# Patient Record
Sex: FEMALE | URBAN - METROPOLITAN AREA
[De-identification: names, ages, dates, MRNs, and addresses within clinical notes are randomized per-mention and may not be internally consistent; named-entity substitution may affect disease eponyms.]

---

## 2018-07-24 ENCOUNTER — OFFICE VISIT (OUTPATIENT)
Dept: URGENT CARE | Facility: URGENT CARE | Age: 38
End: 2018-07-24
Payer: COMMERCIAL

## 2018-07-24 ENCOUNTER — ANCILLARY PROCEDURE (OUTPATIENT)
Dept: RADIOLOGY | Facility: URGENT CARE | Age: 38
End: 2018-07-24
Payer: COMMERCIAL

## 2018-07-24 VITALS
TEMPERATURE: 97.8 F | HEART RATE: 89 BPM | BODY MASS INDEX: 20.58 KG/M2 | HEIGHT: 61 IN | OXYGEN SATURATION: 98 % | DIASTOLIC BLOOD PRESSURE: 86 MMHG | WEIGHT: 109 LBS | SYSTOLIC BLOOD PRESSURE: 119 MMHG | RESPIRATION RATE: 17 BRPM

## 2018-07-24 DIAGNOSIS — R06.00 DYSPNEA, UNSPECIFIED TYPE: ICD-10-CM

## 2018-07-24 DIAGNOSIS — R10.12 LEFT UPPER QUADRANT PAIN: Primary | ICD-10-CM

## 2018-07-24 LAB
ALBUMIN SERPL-MCNC: 3.6 G/DL (ref 3.4–5)
ALP SERPL-CCNC: 39 U/L (ref 37–98)
ALT SERPL-CCNC: 21 U/L (ref 0–77)
ANION GAP SERPL CALC-SCNC: 3 MMOL/L (ref 3–11)
AST SERPL-CCNC: 24 U/L (ref 0–37)
BASOPHILS # BLD AUTO: 0.04 10*3/UL
BASOPHILS NFR BLD AUTO: 0 % (ref 0–2)
BILIRUB SERPL-MCNC: 0.9 MG/DL (ref 0–1.4)
BILIRUB UR QL: NEGATIVE
BUN SERPL-MCNC: 14 MG/DL (ref 7–25)
CALCIUM ALBUM COR SERPL-MCNC: 9.2 MG/DL (ref 8.5–10.1)
CALCIUM SERPL-MCNC: 8.9 MG/DL (ref 8.6–10.1)
CHLORIDE SERPL-SCNC: 103 MMOL/L (ref 98–107)
CLARITY UR: CLEAR
CO2 SERPL-SCNC: 29 MMOL/L (ref 21–32)
COLOR UR: YELLOW
CREAT SERPL-MCNC: 0.8 MG/DL (ref 0.6–1.1)
EOSINOPHIL # BLD AUTO: 0.11 10*3/UL
EOSINOPHIL NFR BLD AUTO: 1 % (ref 0–5)
ERYTHROCYTE [DISTWIDTH] IN BLOOD BY AUTOMATED COUNT: 11.4 % (ref 11.6–14.8)
GFR SERPL CREATININE-BSD FRML MDRD: 81 ML/MIN/1.73M*2
GLUCOSE SERPL-MCNC: 78 MG/DL (ref 70–105)
GLUCOSE UR QL: NEGATIVE MG/DL
HCG UR QL: NEGATIVE
HCT VFR BLD AUTO: 41.4 % (ref 37.7–47.9)
HGB BLD-MCNC: 14.3 G/DL (ref 12.1–16.2)
HGB UR QL: ABNORMAL
KETONES UR-MCNC: NEGATIVE MG/DL
LEUKOCYTE ESTERASE UR QL STRIP: NEGATIVE
LIPASE SERPL-CCNC: 88 U/L (ref 11–82)
LYMPHOCYTES # BLD AUTO: 3 10*3/UL
LYMPHOCYTES NFR BLD AUTO: 31 % (ref 13–40)
MCH RBC QN AUTO: 32.6 PG (ref 26–34)
MCHC RBC AUTO-ENTMCNC: 34.5 G/DL (ref 31–36)
MCV RBC AUTO: 94.5 FL (ref 80–100)
MONOCYTES # BLD AUTO: 0.62 10*3/UL
MONOCYTES NFR BLD AUTO: 6 % (ref 2–11)
NEUTROPHILS # BLD AUTO: 5.95 10*3/UL
NEUTROPHILS NFR BLD AUTO: 61 % (ref 47–80)
NITRITE UR QL: NEGATIVE
PH UR: 5 PH
PLATELET # BLD AUTO: 288 10*3/UL (ref 140–440)
PMV BLD AUTO: 7.9 FL (ref 6.9–10.8)
POTASSIUM SERPL-SCNC: 3.5 MMOL/L (ref 3.6–5)
PROT SERPL-MCNC: 6.7 G/DL (ref 6.4–8.2)
PROT UR STRIP-MCNC: NEGATIVE MG/DL
RBC # BLD AUTO: 4.38 10*6/ΜL (ref 4.05–5.48)
SODIUM SERPL-SCNC: 135 MMOL/L (ref 135–145)
SP GR UR: 1.02 (ref 1–1.03)
UROBILINOGEN UR-MCNC: 0.2 E.U./DL
WBC # BLD AUTO: 9.7 10*3/UL (ref 4.1–10.9)

## 2018-07-24 PROCEDURE — 81003 URINALYSIS AUTO W/O SCOPE: CPT | Performed by: NURSE PRACTITIONER

## 2018-07-24 PROCEDURE — 74018 RADEX ABDOMEN 1 VIEW: CPT | Mod: TC | Performed by: NURSE PRACTITIONER

## 2018-07-24 PROCEDURE — 81025 URINE PREGNANCY TEST: CPT | Performed by: NURSE PRACTITIONER

## 2018-07-24 PROCEDURE — 36415 COLL VENOUS BLD VENIPUNCTURE: CPT | Performed by: NURSE PRACTITIONER

## 2018-07-24 PROCEDURE — 85025 COMPLETE CBC W/AUTO DIFF WBC: CPT | Performed by: NURSE PRACTITIONER

## 2018-07-24 PROCEDURE — 96372 THER/PROPH/DIAG INJ SC/IM: CPT | Performed by: NURSE PRACTITIONER

## 2018-07-24 PROCEDURE — 87088 URINE BACTERIA CULTURE: CPT | Performed by: NURSE PRACTITIONER

## 2018-07-24 PROCEDURE — S0119 ONDANSETRON 4 MG: HCPCS | Performed by: NURSE PRACTITIONER

## 2018-07-24 PROCEDURE — 80053 COMPREHEN METABOLIC PANEL: CPT | Performed by: NURSE PRACTITIONER

## 2018-07-24 PROCEDURE — 93000 ELECTROCARDIOGRAM COMPLETE: CPT | Performed by: NURSE PRACTITIONER

## 2018-07-24 PROCEDURE — 99203 OFFICE O/P NEW LOW 30 MIN: CPT | Mod: 25 | Performed by: NURSE PRACTITIONER

## 2018-07-24 PROCEDURE — 83690 ASSAY OF LIPASE: CPT | Performed by: NURSE PRACTITIONER

## 2018-07-24 RX ORDER — FLUTICASONE PROPIONATE 50 MCG
SPRAY, SUSPENSION (ML) NASAL
Refills: 3 | COMMUNITY
Start: 2018-05-29

## 2018-07-24 RX ORDER — ETHYNODIOL DIACETATE AND ETHINYL ESTRADIOL 1 MG-35MCG
KIT ORAL
Refills: 0 | COMMUNITY
Start: 2018-07-06

## 2018-07-24 RX ORDER — TIZANIDINE 4 MG/1
TABLET ORAL
Refills: 10 | COMMUNITY
Start: 2018-05-29 | End: 2018-07-24 | Stop reason: SDUPTHER

## 2018-07-24 RX ORDER — PHENOL/SODIUM PHENOLATE
AEROSOL, SPRAY (ML) MUCOUS MEMBRANE
COMMUNITY

## 2018-07-24 RX ORDER — HYDROGEN PEROXIDE 3 %
20 SOLUTION, NON-ORAL MISCELLANEOUS
Qty: 30 CAPSULE | Refills: 1 | Status: SHIPPED | OUTPATIENT
Start: 2018-07-24 | End: 2018-08-23

## 2018-07-24 RX ORDER — LIDOCAINE HYDROCHLORIDE 20 MG/ML
15 SOLUTION OROPHARYNGEAL ONCE
Status: COMPLETED | OUTPATIENT
Start: 2018-07-24 | End: 2018-07-24

## 2018-07-24 RX ORDER — KETOROLAC TROMETHAMINE 30 MG/ML
60 INJECTION, SOLUTION INTRAMUSCULAR; INTRAVENOUS ONCE
Status: COMPLETED | OUTPATIENT
Start: 2018-07-24 | End: 2018-07-24

## 2018-07-24 RX ORDER — DEXTROAMPHETAMINE SACCHARATE, AMPHETAMINE ASPARTATE, DEXTROAMPHETAMINE SULFATE AND AMPHETAMINE SULFATE 5; 5; 5; 5 MG/1; MG/1; MG/1; MG/1
TABLET ORAL
Refills: 0 | COMMUNITY
Start: 2018-07-05

## 2018-07-24 RX ORDER — ALUMINUM HYDROXIDE, MAGNESIUM HYDROXIDE, AND SIMETHICONE 1200; 120; 1200 MG/30ML; MG/30ML; MG/30ML
30 SUSPENSION ORAL ONCE
Status: COMPLETED | OUTPATIENT
Start: 2018-07-24 | End: 2018-07-24

## 2018-07-24 RX ORDER — CYCLOBENZAPRINE HCL 10 MG
10 TABLET ORAL 3 TIMES DAILY PRN
Qty: 20 TABLET | Refills: 0 | Status: SHIPPED | OUTPATIENT
Start: 2018-07-24 | End: 2018-08-03

## 2018-07-24 RX ORDER — LINACLOTIDE 290 UG/1
CAPSULE, GELATIN COATED ORAL
Refills: 3 | COMMUNITY
Start: 2018-07-13

## 2018-07-24 RX ORDER — ONDANSETRON 4 MG/1
4 TABLET, ORALLY DISINTEGRATING ORAL ONCE
Status: COMPLETED | OUTPATIENT
Start: 2018-07-24 | End: 2018-07-24

## 2018-07-24 RX ORDER — VALACYCLOVIR HYDROCHLORIDE 500 MG/1
500 TABLET, FILM COATED ORAL
COMMUNITY
Start: 2018-03-20

## 2018-07-24 RX ORDER — HYOSCYAMINE SULFATE 0.12 MG/1
TABLET SUBLINGUAL
COMMUNITY
Start: 2017-08-21

## 2018-07-24 RX ORDER — ONDANSETRON 4 MG/1
4 TABLET, FILM COATED ORAL EVERY 8 HOURS PRN
Qty: 20 TABLET | Refills: 0 | Status: SHIPPED | OUTPATIENT
Start: 2018-07-24 | End: 2018-07-31

## 2018-07-24 RX ORDER — OMEPRAZOLE 40 MG/1
40 CAPSULE, DELAYED RELEASE ORAL
COMMUNITY
Start: 2017-08-14

## 2018-07-24 RX ADMIN — ONDANSETRON 4 MG: 4 TABLET, ORALLY DISINTEGRATING ORAL at 18:49

## 2018-07-24 RX ADMIN — ALUMINUM HYDROXIDE, MAGNESIUM HYDROXIDE, AND SIMETHICONE 30 ML: 1200; 120; 1200 SUSPENSION ORAL at 19:31

## 2018-07-24 RX ADMIN — KETOROLAC TROMETHAMINE 60 MG: 30 INJECTION, SOLUTION INTRAMUSCULAR; INTRAVENOUS at 19:03

## 2018-07-24 RX ADMIN — LIDOCAINE HYDROCHLORIDE 15 ML: 20 SOLUTION OROPHARYNGEAL at 19:32

## 2018-07-24 ASSESSMENT — ENCOUNTER SYMPTOMS
FEVER: 0
CHILLS: 0
HEMATURIA: 0
FLANK PAIN: 0
VOMITING: 0
NECK PAIN: 0

## 2018-07-25 ASSESSMENT — ENCOUNTER SYMPTOMS
NAUSEA: 1
COLOR CHANGE: 0
CHEST TIGHTNESS: 0
NUMBNESS: 0
DIARRHEA: 1
WOUND: 0
SHORTNESS OF BREATH: 1
ABDOMINAL PAIN: 1
PALPITATIONS: 0
WHEEZING: 0
DIZZINESS: 0
DYSURIA: 0
WEAKNESS: 0
CONSTIPATION: 1
SORE THROAT: 0

## 2018-07-25 NOTE — PROGRESS NOTES
"Subjective      HPI    Lori Velasquez is a 38 y.o. female who presents for left upper quadrant abdominal pain ×3 days.  Patient states she has had this pain in the past where she was told she possibly has kidney stones but there is no definitive answer.  She states she developed sudden onset of left upper quadrant abdominal pain felt sharp and wax and wane with intensity.  She states the accompanied with his pain is nausea.  She denies fever, chills, vomiting, dysuria, or urinary symptoms.  Patient states her pain in her abdomen is located in her left upper quadrant that radiates to her right upper quadrant.  States pain is aggravated with eating and has not found any alleviating factors.  She admits to having reflux off and on as well and has tried Pepto-Bismol and over-the-counter Pepcid without improvement.  She has diarrhea daily in the morning due to Linzess.      Review of Systems   Constitutional: Negative for chills and fever.   HENT: Negative for congestion and sore throat.    Respiratory: Positive for shortness of breath. Negative for chest tightness and wheezing.    Cardiovascular: Negative for chest pain, palpitations and leg swelling.   Gastrointestinal: Positive for abdominal pain, constipation (chronic), diarrhea (every morning from taking linzess) and nausea. Negative for vomiting.   Genitourinary: Negative for dysuria, flank pain, hematuria, urgency and vaginal discharge.   Musculoskeletal: Negative for neck pain.   Skin: Negative.  Negative for color change, pallor, rash and wound.   Neurological: Negative for dizziness, weakness and numbness.         Objective   /86 (BP Location: Left arm, Patient Position: Sitting, Cuff Size: Reg)   Pulse 89   Temp 36.6 °C (97.8 °F) (Temporal)   Resp 17   Ht 1.549 m (5' 1\")   Wt 49.4 kg (109 lb)   SpO2 98%   BMI 20.60 kg/m²     Physical Exam   Constitutional: She is oriented to person, place, and time. She appears well-developed and " well-nourished. No distress.   Patient is tearful during exam   HENT:   Head: Normocephalic and atraumatic.   Right Ear: Tympanic membrane, external ear and ear canal normal.   Left Ear: Tympanic membrane, external ear and ear canal normal.   Nose: Nose normal.   Mouth/Throat: Oropharynx is clear and moist and mucous membranes are normal. No oropharyngeal exudate.   Eyes: Pupils are equal, round, and reactive to light. No scleral icterus.   Neck: Normal range of motion.   Cardiovascular: Normal rate, regular rhythm, normal heart sounds and intact distal pulses.  Exam reveals no gallop and no friction rub.    No murmur heard.  Pulmonary/Chest: Effort normal and breath sounds normal. No respiratory distress. She has no wheezes. She has no rales. She exhibits no tenderness.   Abdominal: Soft. Bowel sounds are normal. She exhibits no distension and no mass. There is no hepatosplenomegaly, splenomegaly or hepatomegaly. There is tenderness (bilateral upper quadrants, greater to LUQ) in the right upper quadrant, epigastric area and left upper quadrant. There is no CVA tenderness and negative Javier's sign (had a boby in the past). No hernia.   Musculoskeletal: Normal range of motion.   Neurological: She is alert and oriented to person, place, and time. She has normal strength. No sensory deficit. She exhibits normal muscle tone.   Skin: Skin is warm and dry. Capillary refill takes less than 2 seconds. No bruising and no rash noted. She is not diaphoretic.   Psychiatric: She has a normal mood and affect. Her behavior is normal. Judgment and thought content normal.   Nursing note and vitals reviewed.      No results found for this or any previous visit (from the past 4 hour(s)).    X-ray Kidneys Ureters Bladder (normal, Clinic Performed)    Result Date: 7/24/2018  EXAM : Abdomen single view 07/24/2018 1850 hours. CLINICAL HISTORY : Left upper quadrant pain; LUQ abdominal pain    VIEWS : Supine abdomen COMPARISON(S) : None  FINDINGS : The bowel gas pattern is nonobstructive. No dilated bowel loops are seen. There is no apparent soft tissue mass or mass effect.  No significant pathologic calcifications are identified.  The bones are unremarkable for age.          IMPRESSION: Negative exam.      Assessment/Plan   Diagnoses and all orders for this visit:    Left upper quadrant pain  -     Urinalysis, dipstick Urine, Clean Catch  -     HCG, qualitative, urine Urine, Clean Catch  -     X-ray kidneys ureters bladder (Normal, Clinic Performed)  -     CBC w/auto differential Blood, Venous  -     Comprehensive metabolic panel Blood, Venous  -     Lipase Blood, Venous  -     ondansetron ODT (ZOFRAN-ODT) disintegrating tablet 4 mg; Take 1 tablet (4 mg total) by mouth once.   -     ketorolac (TORADOL) injection 60 mg; Inject 2 mL (60 mg total) into the shoulder, thigh, or buttocks once.   -     alum-mag hydroxide-simeth (MAALOX ADVANCED) suspension 30 mL; Take 30 mL by mouth once.   -     lidocaine HCl ((viscous)) 2 % mucosal solution 15 mL; Swish and spit 15 mL once.   -     ECG 12 lead  -     ondansetron (ZOFRAN) 4 mg tablet; Take 1 tablet (4 mg total) by mouth every 8 (eight) hours as needed for nausea or vomiting for up to 7 days.  -     cyclobenzaprine (FLEXERIL) 10 mg tablet; Take 1 tablet (10 mg total) by mouth 3 (three) times a day as needed for muscle spasms for up to 10 days.  -     esomeprazole (NexIUM) 20 mg capsule; Take 1 capsule (20 mg total) by mouth every morning before breakfast.  -     Urine culture    Chest pain on breathing        Discussion:     Patient was provided Zofran and GI cocktail.  Patient admits to improvement with nausea with Zofran but denies any improvement with abdominal discomfort with GI cocktail.  She was also provided Toradol with slight improvement with abdominal pain.  Did discuss with patient given her abdominal pain during exam, was concerning and may benefit from having a CAT scan.  Patient states she  has slight improvement will see how she feels throughout the night.  Strongly encourage patient to go to the ER should she develop severe pain again as well as fever, intractable vomiting, or any other new concerning symptoms.  Due to patient declining a CAT scan at this time, did offer patient to contact us tomorrow should she change her mind or develop abdominal pain and I will order one.  Will call patient with lipase and urine culture results and extend offer again for imaging depending on how patient is feeling.  Patient agrees with plan.  Patient verbalized understanding and denies any  further questions or concerns at this time.       Jennifer G Franke, CNP

## 2018-07-27 LAB — BACTERIA UR CULT: NORMAL

## 2018-09-11 DIAGNOSIS — R10.12 LEFT UPPER QUADRANT PAIN: ICD-10-CM

## 2018-09-11 RX ORDER — HYDROGEN PEROXIDE 3 %
SOLUTION, NON-ORAL MISCELLANEOUS
Qty: 30 CAPSULE | Refills: 0 | OUTPATIENT
Start: 2018-09-11

## 2020-11-25 ENCOUNTER — TRANSFERRED RECORDS (OUTPATIENT)
Dept: HEALTH INFORMATION MANAGEMENT | Facility: CLINIC | Age: 40
End: 2020-11-25

## 2021-06-15 ENCOUNTER — TRANSFERRED RECORDS (OUTPATIENT)
Dept: HEALTH INFORMATION MANAGEMENT | Facility: CLINIC | Age: 41
End: 2021-06-15

## 2021-06-16 ENCOUNTER — TRANSFERRED RECORDS (OUTPATIENT)
Dept: HEALTH INFORMATION MANAGEMENT | Facility: CLINIC | Age: 41
End: 2021-06-16

## 2022-05-16 ENCOUNTER — TRANSFERRED RECORDS (OUTPATIENT)
Dept: HEALTH INFORMATION MANAGEMENT | Facility: CLINIC | Age: 42
End: 2022-05-16

## 2022-05-16 LAB
ALT SERPL-CCNC: 20 (ref 5–45)
AST SERPL-CCNC: 24 (ref 5–45)
CREATININE (EXTERNAL): 0.63 MG/DL (ref 0.4–1.1)
GFR ESTIMATED (EXTERNAL): >90 ML/MIN/1.73M2
GLUCOSE (EXTERNAL): 92 MG/DL (ref 70–99)
POTASSIUM (EXTERNAL): 3.4 MEQ/LITER (ref 3.4–5)
TSH SERPL-ACNC: 2.64 (ref 0.55–4.78)

## 2022-05-24 ENCOUNTER — TRANSCRIBE ORDERS (OUTPATIENT)
Dept: OTHER | Age: 42
End: 2022-05-24
Payer: COMMERCIAL

## 2022-05-24 DIAGNOSIS — L65.9 HAIR LOSS: Primary | ICD-10-CM

## 2022-07-20 ENCOUNTER — TRANSFERRED RECORDS (OUTPATIENT)
Dept: HEALTH INFORMATION MANAGEMENT | Facility: CLINIC | Age: 42
End: 2022-07-20

## 2022-07-21 ENCOUNTER — TRANSFERRED RECORDS (OUTPATIENT)
Dept: HEALTH INFORMATION MANAGEMENT | Facility: CLINIC | Age: 42
End: 2022-07-21

## 2022-10-21 ENCOUNTER — TRANSFERRED RECORDS (OUTPATIENT)
Dept: HEALTH INFORMATION MANAGEMENT | Facility: CLINIC | Age: 42
End: 2022-10-21

## 2022-11-10 ENCOUNTER — TRANSFERRED RECORDS (OUTPATIENT)
Dept: HEALTH INFORMATION MANAGEMENT | Facility: CLINIC | Age: 42
End: 2022-11-10

## 2022-11-10 LAB
ALT SERPL-CCNC: 37 IU/L (ref 0–32)
AST SERPL-CCNC: 29 IU/L (ref 0–40)
CREATININE (EXTERNAL): 0.74 MG/DL (ref 0.57–1)
GFR ESTIMATED (EXTERNAL): 104 ML/MIN/1.73
POTASSIUM (EXTERNAL): 4 MMOL/L (ref 3.5–5.2)
TSH SERPL-ACNC: 2.51 UIU/ML (ref 0.45–4.5)

## 2022-12-02 ENCOUNTER — TRANSFERRED RECORDS (OUTPATIENT)
Dept: HEALTH INFORMATION MANAGEMENT | Facility: CLINIC | Age: 42
End: 2022-12-02

## 2022-12-23 NOTE — TELEPHONE ENCOUNTER
FUTURE VISIT INFORMATION      FUTURE VISIT INFORMATION:    Date: 3.13.23    Time: 9:10    Location: [facility]  REFERRAL INFORMATION:    Referring provider:  Christiana    Referring providers clinic:  [facility]    Reason for visit/diagnosis  Hair loss, Referred by: Nani De La Paz with [facility], No Recs, recs will be sent over, per pt Va    RECORDS REQUESTED FROM:       Clinic name Comments Records Status Imaging Status   [facility] Received Sent to scanning                                    Action 2.14.23 jm   Action Taken Faxed records request to [facility] at [phone].    Recs received. Sent to HIM for scanning into chart. Also placed in Dr. Carbone's Med Recods tab.

## 2023-01-06 ENCOUNTER — TRANSFERRED RECORDS (OUTPATIENT)
Dept: HEALTH INFORMATION MANAGEMENT | Facility: CLINIC | Age: 43
End: 2023-01-06

## 2023-01-30 ENCOUNTER — TELEPHONE (OUTPATIENT)
Dept: DERMATOLOGY | Facility: CLINIC | Age: 43
End: 2023-01-30
Payer: COMMERCIAL

## 2023-01-30 NOTE — TELEPHONE ENCOUNTER
[facility] Call Center    Phone Message    May a detailed message be left on voicemail: yes     Reason for Call: Other: Patient has an appt with Dr. Carbone on 03/13/2023. Patient is travelling from [place] for the appt and wants to know if there is any lab work that would need to be done that she can get orders for and sent to [place] so that she will be best prepared for her appt. She also would like to know if there is any medical release forms she needs and can have sent to her so that is already taken care of as well.    Please call back  Thank you    Action Taken: Message routed to:  [facility] ([facility] Derm    Travel Screening: Not Applicable

## 2023-01-31 NOTE — TELEPHONE ENCOUNTER
Writer spoke with patient on this date.  New patient: HL referral    She has an upcoming appointment with Dr. Carbone on 3/13/23 and would like to know if there are any labs she can get done prior. She is traveling from [place], [place].    Writer explained to patient that provider may choose to order same day labs on her appointment date, if necessary. Otherwise, we would certainly welcome any derm records to review in preparation for her upcoming appointment.    Patient was provided with Dermatology Clinic address, phone number, and fax. Also sent patient [employer] link to sign up.    Patient had no further questions.    Kimberly Pulido LPN

## 2023-02-06 ENCOUNTER — TRANSFERRED RECORDS (OUTPATIENT)
Dept: HEALTH INFORMATION MANAGEMENT | Facility: CLINIC | Age: 43
End: 2023-02-06

## 2023-02-06 NOTE — TELEPHONE ENCOUNTER
Marlys Carbone MD  You 2 days ago     ALEC Gill,   Totally agree - we will make any decisions regarding labs after we meet Va.   However, Odalis, can you print out any information we have to date on this patient  - I can review in advance.  Maybe you can forward in clinic Tuesday?   Regards,         Writer spoke to the patient on this date. Patient will work on getting records faxed to us.  I confirmed our fax number.    Kimberly Pulido LPN

## 2023-02-12 ENCOUNTER — HEALTH MAINTENANCE LETTER (OUTPATIENT)
Age: 43
End: 2023-02-12

## 2023-02-24 ENCOUNTER — TRANSFERRED RECORDS (OUTPATIENT)
Dept: HEALTH INFORMATION MANAGEMENT | Facility: CLINIC | Age: 43
End: 2023-02-24

## 2023-02-27 ENCOUNTER — TRANSFERRED RECORDS (OUTPATIENT)
Dept: HEALTH INFORMATION MANAGEMENT | Facility: CLINIC | Age: 43
End: 2023-02-27

## 2023-03-01 ENCOUNTER — TRANSFERRED RECORDS (OUTPATIENT)
Dept: HEALTH INFORMATION MANAGEMENT | Facility: CLINIC | Age: 43
End: 2023-03-01

## 2023-03-02 ENCOUNTER — TRANSFERRED RECORDS (OUTPATIENT)
Dept: HEALTH INFORMATION MANAGEMENT | Facility: CLINIC | Age: 43
End: 2023-03-02

## 2023-03-06 NOTE — PROGRESS NOTES
[facility] Dermatology Note  Encounter Date: Mar 13, 2023  Office Visit     Dermatology Problem List:  1. 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 Abnormal iron levels  3. Dermatitis NOS, s/p punch bx 3/13/2023 (pending)  4. 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   Outside biopsy uploaded to media tab. Outside bx c/w androgenetic alopecia vs dermal hypersensitivity. Scalp on exam is pink and patient reports active burning sensation.   - Hair metrix performed today   - Labs ordered: CBC, CMP, Vit D, TSH, Iron studies (ferritin, iron, tibc), androgen studies (free and total testosterone, DHEAS ), UA, rheumatology labs (see below)  - Continue minoxidil 5% foam to scalp nightly.   - Continue topical spironolactone   - Scalp biopsy today, see procedure note below, ddx: dermal hypersensitivity including irritant dermatitis, contact dermatitis, neurogenic vs androgenetic  - Epidermal nerve biopsy of the scalp today, see procedure note below  - Hair Metrix performed today    # Abnormal iron levels & low testosterone  - Planning to see endocrinology    # Dermatitis NOS    On exam there are pink scaly papules on the trunk. Patient brings photos with an erythematous patchy cutaneous eruption involving legs, arms, chest, neck. One image with livedo involving the thighs. Systemic symptoms reported by patient include: joint pain, arm muscle weakness, SOB, malaise.  ddx: dermal hypersensitivity, contact vs irritant dermatitis, atopic dermatitis > mastocytosis vs vasculitis.  Per patient, was worked up by rheum during which she was  tested for lupus (her mother has lupus), but her workup was unremarkable.   - Patient will send over patch testing results. Record release signed.    - f/u dermatomyositis panel, tryptase, SPEP, UA, JEREMY, ANCA, Ige, anti IgE.  - L trunk punch biopsy today, see procedure note below  - photos patient brought in are in chart.     Procedures Performed:   - Punch biopsy procedure note, location(s): left trunk x 1. After discussion of benefits and risks including but not limited to bleeding, infection, scar, incomplete removal, recurrence, and non-diagnostic biopsy, written consent and photographs were obtained. The area was cleaned with isopropyl alcohol. 0.5mL of 1% lidocaine with epinephrine was injected to obtain adequate anesthesia and a 4 mm punch biopsy was performed at site(s). 4-0 Prolene sutures were utilized to approximate the epidermal edges. White petrolatum ointment and a bandage was applied to the wound. Explicit verbal and written wound care instructions were provided. The patient left the dermatology clinic in good condition.   - Scalp punch biopsy procedure note, location(s): L parietal x3 (vertical, horizontal, epidermal nerve fiber). After discussion of benefits and risks including but not limited to bleeding, infection, scar, incomplete removal, recurrence, and non-diagnostic biopsy, written consent and photographs were obtained. The area was cleaned with isopropyl alcohol. 1% lidocaine with epinephrine was injected to obtain adequate anesthesia of 2 lesion(s) . Two 4 mm punch biopsies were performed, one for horizontal and one for vertical sectioning. 4-0 Prolene sutures were utilized to approximate the epidermal edges. White petrolatum ointment and a bandage was applied to the wound. Explicit verbal and written wound care instructions were provided. The patient left the dermatology clinic in good condition.     Hair Metrix:   - Frontal Scalp: 241, mild perifollicular scale   - Mid-Scalp: 300   -  "Vertex Scalp: 266, mild perifollicular and loose scale, patchy pinkness   - Occipital Scalp: 217   - Right Temporal Scalp: 191   - Left Temporal Scalp: 174    Follow-up: 3-4 week(s) virtually (telephone with photos) to discuss biopsy results    Staff and Scribe:      Scribe Disclosure:  I, GAVIN MTZ, am serving as a scribe to document services personally performed by Marlys Carbone MD based on data collection and the provider's statements to me.     Dr Alicia MD PGY4    Provider Disclosure:     Provider Disclosure:   The documentation recorded by the scribe accurately reflects the services I personally performed and the decisions made by me.    Patient was seen and examined with the medicine/dermatology resident. I agree with the history, review of systems, physical examination, assessments and plan. I was present for the key portions of the biopsy procedures.    Marlys Carbone MD  Professor and  Chair  Department of Dermatology  [facility]    ____________________________________________    CC: Hair Loss (HL: has worsened since January 2022, notes that scalp \"burns and tingles\")    HPI:  Ms. Va Riddle is a 42 year old female who presents as a new patient for evaluation of hair loss. The patient first noticed hair thinning several years ago. Was previously slow but in January 2022 the thinning worsened rapidly. Experiences burning and tingling sensations on her scalp that will worsen right before her period. Was also having irregular and painful periods at this time with history of a fibroid. Noticed that hair loss also worsened when she stopped taking her birth control pills. Had some irregular iron labs in September and is planning to see endocrinology. Has joint pain and some hives/a rash on her body. Rash is triggered by sunlight. Labs did not indicate Lupus. Some muscle weakness in her hands and arms. Recent problems with shortness of breath and dizziness. No " issues with swallowing. History of Raynaud's.  - Seen at the request of: [facility] in [place]   - Reason for referral: hair loss  - Onset of hair loss: Several years ago, noticeable worsening January 2022  - Affected areas: temples, back of the scalp  - Shedding or thinning, or both: Thinning   - Percentage of hair loss: 70%  Yes - the burning sensation Scalp pain   Yes- along the temples 4-5/10 Scalp burning   No - history  Scalp itching    Yes- thinning Eyebrow changes    No Eyelash changes   No Beard changes    No Other body hair changes    No Nail changes    No Additional symptoms? (please list below)     - Current treatments: Rogaine 5% foam, topical spironolactone   - Prior discontinued treatments: None  - Prior biopsies: Yes, showed androgenetic alopecia (records available:  dermatology)  - Prior labs: TSH, T4, CMP, Ferritin, Iron, CBC, Testosterone (9/20/22)   - Scalp or hair care habits/products:   - - - Which products? How many times per week?: Multiple and varied products  - - - Frequency of hair sprays, gels, dyes, heat styling, perms, weaves or extensions?: Colors her hair once monthly, Extensions   - Menses: Irregular menstruation with irregular bleeinf  - Unwanted hair growth/hirsutism: Yes  - Personal history of thyroid dysfunction or autoimmune disease: No but recent history of abnormal iron levels, rashes present over her body, low testosterone, shortness of breath   - Family history of hair loss: Yes, younger sister and grandmother   - Family history of autoimmune disease: Grandmother with Lupus   - Major family, financial, school/work, or other social stressors in the few months prior to hair loss: Ongoing stress  - Major recent illness/hospitalizations: None  - COVID status: Yes, unknown date    Patient is otherwise feeling well, in usual state of health, and has no additional skin concerns today.     ROS: As per HPI    Labs:  TSH, T4, CMP, Ferritin, Iron, CBC, Testosterone (9/20/22)  reviewed.    Physical Exam:  Vitals: BP (!) 129/91   Pulse 90   GEN: Well developed, well-nourished, in no acute distress, in a pleasant mood.    SKIN: Focused examination of the scalp, face, back and abdomen was performed. Reviewed patient photos of rash on her body.   - Slater type: I  - Jayant part width of 1.2, 1.1 vertex region  - Small glenn tree pattern  - Thinning along the temples   - The layers of hair regrowth layers were noted to be  - Robust 1-2 cm for the first  - Robust 3-4 cm at the second  - Normal eyelash density  - Normal eyebrow density  - No nail pitting or dystrophy   - No scalp folliculitis/pustules   - Scattered pink 1-3 mm red non-blanching papules on trunk  - No other lesions of concern on areas examined.     Medications:  Current Outpatient Medications   Medication     amphetamine-dextroamphetamine (ADDERALL) 20 MG tablet     atenolol (TENORMIN) 25 MG tablet     betamethasone dipropionate (DIPROSONE) 0.05 % external cream     clobetasol (TEMOVATE) 0.05 % external solution     ketoconazole (NIZORAL) 2 % external shampoo     linaclotide (LINZESS) 290 MCG capsule     MINOXIDIL FOR WOMEN EX     sucralfate (CARAFATE) 1 GM tablet     UBRELVY 100 MG tablet     XOLAIR 150 MG/ML SOSY injection     No current facility-administered medications for this visit.      Past Medical History:   There is no problem list on file for this patient.    No past medical history on file.    CC Provider Not In System    on close of this encounter.

## 2023-03-13 ENCOUNTER — PRE VISIT (OUTPATIENT)
Dept: DERMATOLOGY | Facility: CLINIC | Age: 43
End: 2023-03-13
Payer: COMMERCIAL

## 2023-03-13 ENCOUNTER — OFFICE VISIT (OUTPATIENT)
Dept: DERMATOLOGY | Facility: CLINIC | Age: 43
End: 2023-03-13
Payer: COMMERCIAL

## 2023-03-13 ENCOUNTER — APPOINTMENT (OUTPATIENT)
Dept: LAB | Facility: CLINIC | Age: 43
End: 2023-03-13
Payer: COMMERCIAL

## 2023-03-13 VITALS — HEART RATE: 90 BPM | SYSTOLIC BLOOD PRESSURE: 129 MMHG | DIASTOLIC BLOOD PRESSURE: 91 MMHG

## 2023-03-13 DIAGNOSIS — L30.9 DERMATITIS: ICD-10-CM

## 2023-03-13 DIAGNOSIS — L65.9 HAIR LOSS: Primary | ICD-10-CM

## 2023-03-13 LAB
ALBUMIN SERPL BCG-MCNC: 4.6 G/DL (ref 3.5–5.2)
ALBUMIN UR-MCNC: NEGATIVE MG/DL
ALP SERPL-CCNC: 64 U/L (ref 35–104)
ALT SERPL W P-5'-P-CCNC: 36 U/L (ref 10–35)
ANION GAP SERPL CALCULATED.3IONS-SCNC: 10 MMOL/L (ref 7–15)
APPEARANCE UR: CLEAR
AST SERPL W P-5'-P-CCNC: 28 U/L (ref 10–35)
BASOPHILS # BLD AUTO: 0 10E3/UL (ref 0–0.2)
BASOPHILS NFR BLD AUTO: 0 %
BILIRUB SERPL-MCNC: 0.8 MG/DL
BILIRUB UR QL STRIP: NEGATIVE
BUN SERPL-MCNC: 15.2 MG/DL (ref 6–20)
CALCIUM SERPL-MCNC: 9.5 MG/DL (ref 8.6–10)
CHLORIDE SERPL-SCNC: 103 MMOL/L (ref 98–107)
COLOR UR AUTO: ABNORMAL
CREAT SERPL-MCNC: 0.78 MG/DL (ref 0.51–0.95)
DEPRECATED CALCIDIOL+CALCIFEROL SERPL-MC: 27 UG/L (ref 20–75)
DEPRECATED HCO3 PLAS-SCNC: 26 MMOL/L (ref 22–29)
EOSINOPHIL # BLD AUTO: 0 10E3/UL (ref 0–0.7)
EOSINOPHIL NFR BLD AUTO: 0 %
ERYTHROCYTE [DISTWIDTH] IN BLOOD BY AUTOMATED COUNT: 12.8 % (ref 10–15)
FERRITIN SERPL-MCNC: 116 NG/ML (ref 6–175)
GFR SERPL CREATININE-BSD FRML MDRD: >90 ML/MIN/1.73M2
GLUCOSE SERPL-MCNC: 117 MG/DL (ref 70–99)
GLUCOSE UR STRIP-MCNC: NEGATIVE MG/DL
HCT VFR BLD AUTO: 40.1 % (ref 35–47)
HGB BLD-MCNC: 13.3 G/DL (ref 11.7–15.7)
HGB UR QL STRIP: ABNORMAL
IMM GRANULOCYTES # BLD: 0.1 10E3/UL
IMM GRANULOCYTES NFR BLD: 0 %
IRON BINDING CAPACITY (ROCHE): 379 UG/DL (ref 240–430)
IRON SATN MFR SERPL: 26 % (ref 15–46)
IRON SERPL-MCNC: 99 UG/DL (ref 37–145)
KETONES UR STRIP-MCNC: NEGATIVE MG/DL
LEUKOCYTE ESTERASE UR QL STRIP: NEGATIVE
LYMPHOCYTES # BLD AUTO: 2.2 10E3/UL (ref 0.8–5.3)
LYMPHOCYTES NFR BLD AUTO: 18 %
MCH RBC QN AUTO: 31.4 PG (ref 26.5–33)
MCHC RBC AUTO-ENTMCNC: 33.2 G/DL (ref 31.5–36.5)
MCV RBC AUTO: 95 FL (ref 78–100)
MONOCYTES # BLD AUTO: 0.5 10E3/UL (ref 0–1.3)
MONOCYTES NFR BLD AUTO: 4 %
MUCOUS THREADS #/AREA URNS LPF: PRESENT /LPF
NEUTROPHILS # BLD AUTO: 9.2 10E3/UL (ref 1.6–8.3)
NEUTROPHILS NFR BLD AUTO: 78 %
NITRATE UR QL: NEGATIVE
NRBC # BLD AUTO: 0 10E3/UL
NRBC BLD AUTO-RTO: 0 /100
PH UR STRIP: 5.5 [PH] (ref 5–7)
PLATELET # BLD AUTO: 305 10E3/UL (ref 150–450)
POTASSIUM SERPL-SCNC: 3.5 MMOL/L (ref 3.4–5.3)
PROT SERPL-MCNC: 6.8 G/DL (ref 6.4–8.3)
RBC # BLD AUTO: 4.24 10E6/UL (ref 3.8–5.2)
RBC URINE: 6 /HPF
SHBG SERPL-SCNC: 86 NMOL/L (ref 30–135)
SODIUM SERPL-SCNC: 139 MMOL/L (ref 136–145)
SP GR UR STRIP: 1.02 (ref 1–1.03)
SQUAMOUS EPITHELIAL: 8 /HPF
T4 FREE SERPL-MCNC: 1.34 NG/DL (ref 0.9–1.7)
TOTAL PROTEIN SERUM FOR ELP: 6.5 G/DL (ref 6.4–8.3)
TSH SERPL DL<=0.005 MIU/L-ACNC: 3.04 UIU/ML (ref 0.3–4.2)
UROBILINOGEN UR STRIP-MCNC: NORMAL MG/DL
WBC # BLD AUTO: 11.9 10E3/UL (ref 4–11)
WBC URINE: 1 /HPF

## 2023-03-13 PROCEDURE — 88350 IMFLUOR EA ADDL 1ANTB STN PX: CPT | Mod: 90 | Performed by: PATHOLOGY

## 2023-03-13 PROCEDURE — 86038 ANTINUCLEAR ANTIBODIES: CPT | Performed by: PATHOLOGY

## 2023-03-13 PROCEDURE — 88305 TISSUE EXAM BY PATHOLOGIST: CPT | Mod: 26 | Performed by: PATHOLOGY

## 2023-03-13 PROCEDURE — 36415 COLL VENOUS BLD VENIPUNCTURE: CPT | Performed by: PATHOLOGY

## 2023-03-13 PROCEDURE — 82627 DEHYDROEPIANDROSTERONE: CPT | Performed by: PATHOLOGY

## 2023-03-13 PROCEDURE — 84403 ASSAY OF TOTAL TESTOSTERONE: CPT | Performed by: PATHOLOGY

## 2023-03-13 PROCEDURE — 11105 PUNCH BX SKIN EA SEP/ADDL: CPT | Mod: GC | Performed by: DERMATOLOGY

## 2023-03-13 PROCEDURE — 88356 ANALYSIS NERVE: CPT | Mod: 90 | Performed by: PATHOLOGY

## 2023-03-13 PROCEDURE — 83516 IMMUNOASSAY NONANTIBODY: CPT | Mod: 90 | Performed by: PATHOLOGY

## 2023-03-13 PROCEDURE — 84439 ASSAY OF FREE THYROXINE: CPT | Performed by: PATHOLOGY

## 2023-03-13 PROCEDURE — 82785 ASSAY OF IGE: CPT | Performed by: PATHOLOGY

## 2023-03-13 PROCEDURE — 82728 ASSAY OF FERRITIN: CPT | Performed by: PATHOLOGY

## 2023-03-13 PROCEDURE — 99204 OFFICE O/P NEW MOD 45 MIN: CPT | Mod: 25 | Performed by: DERMATOLOGY

## 2023-03-13 PROCEDURE — 81001 URINALYSIS AUTO W/SCOPE: CPT | Performed by: PATHOLOGY

## 2023-03-13 PROCEDURE — 86037 ANCA TITER EACH ANTIBODY: CPT | Performed by: PATHOLOGY

## 2023-03-13 PROCEDURE — 88346 IMFLUOR 1ST 1ANTB STAIN PX: CPT | Mod: 90 | Performed by: PATHOLOGY

## 2023-03-13 PROCEDURE — 88305 TISSUE EXAM BY PATHOLOGIST: CPT | Mod: TC | Performed by: DERMATOLOGY

## 2023-03-13 PROCEDURE — 83540 ASSAY OF IRON: CPT | Performed by: PATHOLOGY

## 2023-03-13 PROCEDURE — 88348 ELECTRON MICROSCOPY DX: CPT | Mod: 90 | Performed by: PATHOLOGY

## 2023-03-13 PROCEDURE — 83520 IMMUNOASSAY QUANT NOS NONAB: CPT | Performed by: PATHOLOGY

## 2023-03-13 PROCEDURE — 88314 HISTOCHEMICAL STAINS ADD-ON: CPT | Mod: 90 | Performed by: PATHOLOGY

## 2023-03-13 PROCEDURE — 83550 IRON BINDING TEST: CPT | Performed by: PATHOLOGY

## 2023-03-13 PROCEDURE — 80050 GENERAL HEALTH PANEL: CPT | Performed by: PATHOLOGY

## 2023-03-13 PROCEDURE — 86235 NUCLEAR ANTIGEN ANTIBODY: CPT | Mod: 90 | Performed by: PATHOLOGY

## 2023-03-13 PROCEDURE — 88305 TISSUE EXAM BY PATHOLOGIST: CPT | Mod: 90 | Performed by: PATHOLOGY

## 2023-03-13 PROCEDURE — 11104 PUNCH BX SKIN SINGLE LESION: CPT | Mod: GC | Performed by: DERMATOLOGY

## 2023-03-13 PROCEDURE — 82306 VITAMIN D 25 HYDROXY: CPT | Performed by: PATHOLOGY

## 2023-03-13 PROCEDURE — 84270 ASSAY OF SEX HORMONE GLOBUL: CPT | Performed by: PATHOLOGY

## 2023-03-13 PROCEDURE — 84165 PROTEIN E-PHORESIS SERUM: CPT

## 2023-03-13 PROCEDURE — 86036 ANCA SCREEN EACH ANTIBODY: CPT | Performed by: PATHOLOGY

## 2023-03-13 PROCEDURE — 99000 SPECIMEN HANDLING OFFICE-LAB: CPT | Performed by: PATHOLOGY

## 2023-03-13 RX ORDER — SUCRALFATE 1 G/1
1 TABLET ORAL
COMMUNITY
Start: 2022-03-24

## 2023-03-13 RX ORDER — KETOCONAZOLE 20 MG/ML
SHAMPOO TOPICAL
COMMUNITY
Start: 2022-09-23

## 2023-03-13 RX ORDER — BETAMETHASONE DIPROPIONATE 0.5 MG/G
CREAM TOPICAL
COMMUNITY
Start: 2023-02-22 | End: 2024-07-15

## 2023-03-13 RX ORDER — OMALIZUMAB 150 MG/ML
INJECTION, SOLUTION SUBCUTANEOUS
COMMUNITY
Start: 2023-03-09 | End: 2024-07-15 | Stop reason: ALTCHOICE

## 2023-03-13 RX ORDER — CLOBETASOL PROPIONATE 0.5 MG/ML
SOLUTION TOPICAL
COMMUNITY
Start: 2022-11-21

## 2023-03-13 RX ORDER — UBROGEPANT 100 MG/1
TABLET ORAL
COMMUNITY
Start: 2023-02-27

## 2023-03-13 RX ORDER — ATENOLOL 25 MG/1
TABLET ORAL
COMMUNITY
Start: 2023-01-06

## 2023-03-13 RX ORDER — DEXTROAMPHETAMINE SACCHARATE, AMPHETAMINE ASPARTATE, DEXTROAMPHETAMINE SULFATE AND AMPHETAMINE SULFATE 5; 5; 5; 5 MG/1; MG/1; MG/1; MG/1
1 TABLET ORAL
COMMUNITY

## 2023-03-13 ASSESSMENT — PAIN SCALES - GENERAL: PAINLEVEL: NO PAIN (0)

## 2023-03-13 NOTE — NURSING NOTE
"Dermatology Rooming Note    Va Riddle's goals for this visit include:   Chief Complaint   Patient presents with     Hair Loss     HL: has worsened since January 2022, notes that scalp \"burns and tingles\"     Kimberly Pulido LPN    "

## 2023-03-13 NOTE — LETTER
Date:March 27, 2023      Patient was self referred, no letter generated. Do not send.        [facility] Health Information

## 2023-03-13 NOTE — LETTER
3/13/2023       RE: Va Riddle  [address] [facility] [place] [address]     Dear Colleague,    Thank you for referring your patient, Va Riddle, to the [facility] DERMATOLOGY CLINIC [place] at [facility]. Please see a copy of my visit note below.    [facility] Dermatology Note  Encounter Date: Mar 13, 2023  Office Visit     Dermatology Problem List:  1. 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 Abnormal iron levels  3. Dermatitis NOS, s/p punch bx 3/13/2023 (pending)  4. 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   Outside biopsy uploaded to media tab. Outside bx c/w androgenetic alopecia vs dermal hypersensitivity. Scalp on exam is pink and patient reports active burning sensation.   - Hair metrix performed today   - Labs ordered: CBC, CMP, Vit D, TSH, Iron studies (ferritin, iron, tibc), androgen studies (free and total testosterone, DHEAS ), UA, rheumatology labs (see below)  - Continue minoxidil 5% foam to scalp nightly.   - Continue topical spironolactone   - Scalp biopsy today, see procedure note below, ddx: dermal hypersensitivity including irritant dermatitis, contact dermatitis, neurogenic vs androgenetic  - Epidermal nerve biopsy of the scalp today, see procedure note below  - Hair Metrix performed today    # Abnormal iron levels & low testosterone  - Planning to see endocrinology    # Dermatitis NOS    On exam there are pink scaly papules on the trunk. Patient brings photos with an erythematous patchy cutaneous eruption involving  legs, arms, chest, neck. One image with livedo involving the thighs. Systemic symptoms reported by patient include: joint pain, arm muscle weakness, SOB, malaise.  ddx: dermal hypersensitivity, contact vs irritant dermatitis, atopic dermatitis > mastocytosis vs vasculitis.  Per patient, was worked up by rheum during which she was tested for lupus (her mother has lupus), but her workup was unremarkable.   - Patient will send over patch testing results. Record release signed.    - f/u dermatomyositis panel, tryptase, SPEP, UA, JEREMY, ANCA, Ige, anti IgE.  - L trunk punch biopsy today, see procedure note below  - photos patient brought in are in chart.     Procedures Performed:   - Punch biopsy procedure note, location(s): left trunk x 1. After discussion of benefits and risks including but not limited to bleeding, infection, scar, incomplete removal, recurrence, and non-diagnostic biopsy, written consent and photographs were obtained. The area was cleaned with isopropyl alcohol. 0.5mL of 1% lidocaine with epinephrine was injected to obtain adequate anesthesia and a 4 mm punch biopsy was performed at site(s). 4-0 Prolene sutures were utilized to approximate the epidermal edges. White petrolatum ointment and a bandage was applied to the wound. Explicit verbal and written wound care instructions were provided. The patient left the dermatology clinic in good condition.   - Scalp punch biopsy procedure note, location(s): L parietal x3 (vertical, horizontal, epidermal nerve fiber). After discussion of benefits and risks including but not limited to bleeding, infection, scar, incomplete removal, recurrence, and non-diagnostic biopsy, written consent and photographs were obtained. The area was cleaned with isopropyl alcohol. 1% lidocaine with epinephrine was injected to obtain adequate anesthesia of 2 lesion(s) . Two 4 mm punch biopsies were performed, one for horizontal and one for vertical sectioning. 4-0 Prolene sutures were  "utilized to approximate the epidermal edges. White petrolatum ointment and a bandage was applied to the wound. Explicit verbal and written wound care instructions were provided. The patient left the dermatology clinic in good condition.     Hair Metrix:   - Frontal Scalp: 241, mild perifollicular scale   - Mid-Scalp: 300   - Vertex Scalp: 266, mild perifollicular and loose scale, patchy pinkness   - Occipital Scalp: 217   - Right Temporal Scalp: 191   - Left Temporal Scalp: 174    Follow-up: 3-4 week(s) virtually (telephone with photos) to discuss biopsy results    Staff and Scribe:      Scribe Disclosure:  I, GAVIN MTZ, am serving as a scribe to document services personally performed by Marlys Carbone MD based on data collection and the provider's statements to me.     Dr Alicia MD PGY4    Provider Disclosure:     Provider Disclosure:   The documentation recorded by the scribe accurately reflects the services I personally performed and the decisions made by me.    Patient was seen and examined with the medicine/dermatology resident. I agree with the history, review of systems, physical examination, assessments and plan. I was present for the key portions of the biopsy procedures.    Marlys Carbone MD  Professor and  Chair  Department of Dermatology  [facility]    ____________________________________________    CC: Hair Loss (HL: has worsened since January 2022, notes that scalp \"burns and tingles\")    HPI:  Ms. Va Riddle is a 42 year old female who presents as a new patient for evaluation of hair loss. The patient first noticed hair thinning several years ago. Was previously slow but in January 2022 the thinning worsened rapidly. Experiences burning and tingling sensations on her scalp that will worsen right before her period. Was also having irregular and painful periods at this time with history of a fibroid. Noticed that hair loss also worsened when she stopped " taking her birth control pills. Had some irregular iron labs in September and is planning to see endocrinology. Has joint pain and some hives/a rash on her body. Rash is triggered by sunlight. Labs did not indicate Lupus. Some muscle weakness in her hands and arms. Recent problems with shortness of breath and dizziness. No issues with swallowing. History of Raynaud's.  - Seen at the request of: [facility] in [place]   - Reason for referral: hair loss  - Onset of hair loss: Several years ago, noticeable worsening January 2022  - Affected areas: temples, back of the scalp  - Shedding or thinning, or both: Thinning   - Percentage of hair loss: 70%  Yes - the burning sensation Scalp pain   Yes- along the temples 4-5/10 Scalp burning   No - history  Scalp itching    Yes- thinning Eyebrow changes    No Eyelash changes   No Beard changes    No Other body hair changes    No Nail changes    No Additional symptoms? (please list below)     - Current treatments: Rogaine 5% foam, topical spironolactone   - Prior discontinued treatments: None  - Prior biopsies: Yes, showed androgenetic alopecia (records available:  dermatology)  - Prior labs: TSH, T4, CMP, Ferritin, Iron, CBC, Testosterone (9/20/22)   - Scalp or hair care habits/products:   - - - Which products? How many times per week?: Multiple and varied products  - - - Frequency of hair sprays, gels, dyes, heat styling, perms, weaves or extensions?: Colors her hair once monthly, Extensions   - Menses: Irregular menstruation with irregular bleeinf  - Unwanted hair growth/hirsutism: Yes  - Personal history of thyroid dysfunction or autoimmune disease: No but recent history of abnormal iron levels, rashes present over her body, low testosterone, shortness of breath   - Family history of hair loss: Yes, younger sister and grandmother   - Family history of autoimmune disease: Grandmother with Lupus   - Major family, financial, school/work, or other social stressors in the  few months prior to hair loss: Ongoing stress  - Major recent illness/hospitalizations: None  - COVID status: Yes, unknown date    Patient is otherwise feeling well, in usual state of health, and has no additional skin concerns today.     ROS: As per HPI    Labs:  TSH, T4, CMP, Ferritin, Iron, CBC, Testosterone (9/20/22) reviewed.    Physical Exam:  Vitals: BP (!) 129/91   Pulse 90   GEN: Well developed, well-nourished, in no acute distress, in a pleasant mood.    SKIN: Focused examination of the scalp, face, back and abdomen was performed. Reviewed patient photos of rash on her body.   - Slater type: I  - Jayant part width of 1.2, 1.1 vertex region  - Small glenn tree pattern  - Thinning along the temples   - The layers of hair regrowth layers were noted to be  - Robust 1-2 cm for the first  - Robust 3-4 cm at the second  - Normal eyelash density  - Normal eyebrow density  - No nail pitting or dystrophy   - No scalp folliculitis/pustules   - Scattered pink 1-3 mm red non-blanching papules on trunk  - No other lesions of concern on areas examined.     Medications:  Current Outpatient Medications   Medication     amphetamine-dextroamphetamine (ADDERALL) 20 MG tablet     atenolol (TENORMIN) 25 MG tablet     betamethasone dipropionate (DIPROSONE) 0.05 % external cream     clobetasol (TEMOVATE) 0.05 % external solution     ketoconazole (NIZORAL) 2 % external shampoo     linaclotide (LINZESS) 290 MCG capsule     MINOXIDIL FOR WOMEN EX     sucralfate (CARAFATE) 1 GM tablet     UBRELVY 100 MG tablet     XOLAIR 150 MG/ML SOSY injection     No current facility-administered medications for this visit.      Past Medical History:   There is no problem list on file for this patient.    No past medical history on file.    CC Provider Not In System    on close of this encounter.      Again, thank you for allowing me to participate in the care of your patient.      Sincerely,    Marlys Carbone MD

## 2023-03-13 NOTE — NURSING NOTE
Lidocaine-epinephrine 1-1:289585 % injection   2.5mL once for one use, starting 3/13/2023 ending 3/13/2023,  2mL disp, R-0, injection  Injected by Dr. Vargas

## 2023-03-14 LAB
ALBUMIN SERPL ELPH-MCNC: 4.3 G/DL (ref 3.7–5.1)
ALPHA1 GLOB SERPL ELPH-MCNC: 0.2 G/DL (ref 0.2–0.4)
ALPHA2 GLOB SERPL ELPH-MCNC: 0.6 G/DL (ref 0.5–0.9)
ANA SER QL IF: NEGATIVE
B-GLOBULIN SERPL ELPH-MCNC: 0.7 G/DL (ref 0.6–1)
DHEA-S SERPL-MCNC: 28 UG/DL (ref 35–430)
GAMMA GLOB SERPL ELPH-MCNC: 0.7 G/DL (ref 0.7–1.6)
IGE SERPL-ACNC: 58 KU/L (ref 0–114)
M PROTEIN SERPL ELPH-MCNC: 0 G/DL
PROT PATTERN SERPL ELPH-IMP: NORMAL
TRYPTASE SERPL-MCNC: 11.4 UG/L

## 2023-03-15 LAB
ANCA AB PATTERN SER IF-IMP: NORMAL
C-ANCA TITR SER IF: NORMAL {TITER}
TESTOST FREE SERPL-MCNC: 0.08 NG/DL
TESTOST SERPL-MCNC: 9 NG/DL (ref 8–60)

## 2023-03-16 LAB — ANTI IGE ANTIBODY: ABNORMAL

## 2023-03-22 LAB
ANA SER QL: NEGATIVE
ANNOTATION COMMENT IMP: ABNORMAL
EJ AB SER QL: NEGATIVE
ENA JO1 IGG SER-ACNC: 0 AU/ML
MDA5 AB SER QL LINE BLOT: NEGATIVE
MI2 AB SER QL: NEGATIVE
MJ AB SER QL LINE BLOT: ABNORMAL
OJ AB SER QL: NEGATIVE
PL12 AB SER QL: NEGATIVE
PL7 AB SER QL: NEGATIVE
SAE1 AB SER QL LINE BLOT: NEGATIVE
SRP AB SERPL QL: NEGATIVE
TIF1-GAMMA AB SER QL LINE BLOT: NEGATIVE

## 2023-03-27 ENCOUNTER — TRANSFERRED RECORDS (OUTPATIENT)
Dept: HEALTH INFORMATION MANAGEMENT | Facility: CLINIC | Age: 43
End: 2023-03-27

## 2023-03-31 LAB
PATH REPORT.COMMENTS IMP SPEC: NORMAL
PATH REPORT.FINAL DX SPEC: NORMAL
PATH REPORT.GROSS SPEC: NORMAL
PATH REPORT.MICROSCOPIC SPEC OTHER STN: NORMAL
PATH REPORT.RELEVANT HX SPEC: NORMAL

## 2023-03-31 PROCEDURE — 88312 SPECIAL STAINS GROUP 1: CPT | Mod: 26 | Performed by: PATHOLOGY

## 2023-03-31 PROCEDURE — 88312 SPECIAL STAINS GROUP 1: CPT | Mod: TC | Performed by: DERMATOLOGY

## 2023-04-03 NOTE — PROGRESS NOTES
Global Photography Summary (3/13/2023)    Frontal    Superior    Vertex    Right temporal    Left temporal

## 2023-04-03 NOTE — PROGRESS NOTES
HairMetrix Summary (3/13/2023)    Frontal anterior    Mid scalp    Vertex    Occipital    Right temporal    Left temporal    Summary      Left temporal biopsy spot

## 2023-04-04 ENCOUNTER — TELEPHONE (OUTPATIENT)
Dept: DERMATOLOGY | Facility: CLINIC | Age: 43
End: 2023-04-04
Payer: COMMERCIAL

## 2023-04-04 NOTE — TELEPHONE ENCOUNTER
Patient reviewed the biopsy results via [employer]. She is concerned because she has hundreds of these spots and does not know what the next steps of treatment are with this lesion coming back cancerous. Patient preferring to continue care here at the [facility] and not at her current dermatologist.    Please advise when the patient should come back for a biopsy and what type of treatment the patient should have with this BCC    Linda TOVAR CMA

## 2023-04-04 NOTE — TELEPHONE ENCOUNTER
----- Message from Marlys Carbone MD sent at 2023  6:42 AM CDT -----  Regarding: follow-up    HI,  Please let Va know we are waiting for one more test result and that I am looking forward to touching bases with her by phone in April. Please also let her know the folloiwn. The biopsy we did of a spot on her trunk came back as a skin cancer which we did not expect.  She should have the site reviewed and treated by her local dermatologist if possible.    2. We recommend that either her local dermatologist or we take sample of another representative spot on the trunk. Dr. Vargas and I tried to select a such a lesion but we ended up selecting a unique lesion in an overall non-sun exposed area.     Sincerely,  ALEC

## 2023-04-05 LAB — SCANNED LAB RESULT: NORMAL

## 2023-04-11 ENCOUNTER — TRANSFERRED RECORDS (OUTPATIENT)
Dept: HEALTH INFORMATION MANAGEMENT | Facility: CLINIC | Age: 43
End: 2023-04-11
Payer: COMMERCIAL

## 2023-04-18 ENCOUNTER — TRANSFERRED RECORDS (OUTPATIENT)
Dept: HEALTH INFORMATION MANAGEMENT | Facility: CLINIC | Age: 43
End: 2023-04-18
Payer: COMMERCIAL

## 2023-04-21 ENCOUNTER — VIRTUAL VISIT (OUTPATIENT)
Dept: DERMATOLOGY | Facility: CLINIC | Age: 43
End: 2023-04-21
Payer: COMMERCIAL

## 2023-04-21 DIAGNOSIS — L65.9 LOSS OF HAIR: ICD-10-CM

## 2023-04-21 DIAGNOSIS — Z87.448 HISTORY OF BLOOD IN URINE: ICD-10-CM

## 2023-04-21 DIAGNOSIS — L30.9 DERMATITIS: Primary | ICD-10-CM

## 2023-04-21 PROCEDURE — 99214 OFFICE O/P EST MOD 30 MIN: CPT | Mod: 95 | Performed by: DERMATOLOGY

## 2023-04-21 NOTE — PROGRESS NOTES
[facility] Dermatology Note  Encounter Date: Apr 21, 2023  Store-and-Forward and Telephone ([phone] ). Location of teledermatologist: [facility] DERMATOLOGY CLINIC [place].  Start time: 7:28 End time: 7:50    Dermatology Problem List:  1. Hair Loss, outside biopsy on 5/16/22 at  dermatology showed Androgenetic Alopecia vs Dermal hypersensitivity   - Repeat biopsy 3/13/2023  - results reviewed today - see below  - Current tx: topical minoxidil, topical spironolactone   - Hair Metrix 3/13/2023  2. Abnormal iron levels, now OK based on recent results - see below  3. 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   Outside biopsy uploaded to media tab. Outside bx c/w androgenetic alopecia vs dermal hypersensitivity. Scalp on exam is pink and patient reports active burning sensation.   - Hair metrix performed 3/13/23  - Labs ordered: CBC, CMP, Vit D, TSH, Iron studies (ferritin, iron, tibc), androgen studies (free and total testosterone, DHEAS ), UA, rheumatology labs (see below)  - Continue minoxidil 5% foam to scalp nightly.   - Continue topical spironolactone   ,  - Epidermal nerve biopsy of the scalp today, no evidence of neuropathy, in fact, there is an increased number of epidermal nerve fibers, some of which cluster together; there is also significant expression of CGRP, minimal to no mast cell degranulation and no unusual Substance P expression       # Abnormal iron levels & low testosterone  - Planning to see endocrinology     # Dermatitis NOS    Exam at first visit was significant for  pink scaly papules on the  "trunk. Systemic symptoms reported by patient continue to include: joint pain, arm muscle weakness, SOB, malaise.  Per patient at last visit,she was worked up by rheum during which she was tested for lupus (her mother has lupus), but her workup was unremarkable.   - Patient will send over patch testing results. Record release signed but still do not have this information.   - f/u dermatomyositis panel, tryptase, SPEP, UA, JEREMY, ANCA, Ige, anti IgE. - UA with RBCs, TSH was normal as was protein electrophoresis,  Anti IgE antibody was elevated and tryptase was slightly elevated, ferritin, IgE and iron studies were normal as was the polymyositis panel, ANCA, JEREMY, Vitamin D was low normal at 27, androgens were OK, WBC was slightly elevated at 11, 900  - L trunk punch biopsy at last visit, what appeared to be a \"typical' lesion was biopsied but returned as a BCC which Va reports she has had treated. Since, patient has had 2 additional biopsies in [place] which she read the report in our virtual visit - both showed acantholysis suggestive of Darier's   - of note, photos patient brought in that reflect the rash at its worst are in chart.     Procedures Performed:    None as this was a virtual visit    Follow-up: [place] with [place] Brandon Evans [place] [facility] and based on this time/date will have patient also seen in Dermatology and Allergy  Will also request slides from [place] to review here at [facility] - those that are suggestive of Darier's     Staff:     Marlys Carbone MD  Professor and Chair  Department of Dermatology  [facility]    ____________________________________________    CC: No chief complaint on file.    HPI:  Ms. Va Riddle is a(n) 42 year old female who presents today as a return patient for review of all of her lab and biopsy results. She reports ongoing symptoms. Lab results are described above. Scalp biopsy showed:     Vertical sections demonstrate 4 hair " follicles, one of which appear in  nonanagen phase. Sebaceous glands are retained, no scarring fibrous tracts are seen and the overlying epidermis appears normal.  Horizontal sections demonstrate between 24 and 27 hair follicles, depending on the anatomic level. At the level of the isthmus, 24  follicles are seen, 2 of which appear in telogen phase (approximately 5-10 % of hairs in nonanagen phase). At the level of the  infundibulum, 27 hairs are present, with 23 terminal and intermediate hairs and 4 vellus hairs (ppnezckg-fs-khkrho ratio approximately  6 :1). Only minimal perivascular and perifollicular inflammation is noted. PAS is negative for fungal elements. Sebaceous glands are  retained, no scarring fibrous tracts are seen and the overlying epidermis appears largely normal.    Patient is otherwise feeling well, without additional skin concerns.    Labs Reviewed:  See above    Physical Exam:  Vitals: There were no vitals taken for this visit.  - Submitted photos of adequate quality were reviewed.     Medications:  Current Outpatient Medications   Medication     amphetamine-dextroamphetamine (ADDERALL) 20 MG tablet     atenolol (TENORMIN) 25 MG tablet     betamethasone dipropionate (DIPROSONE) 0.05 % external cream     clobetasol (TEMOVATE) 0.05 % external solution     ketoconazole (NIZORAL) 2 % external shampoo     linaclotide (LINZESS) 290 MCG capsule     MINOXIDIL FOR WOMEN EX     sucralfate (CARAFATE) 1 GM tablet     UBRELVY 100 MG tablet     XOLAIR 150 MG/ML SOSY injection     No current facility-administered medications for this visit.      Past Medical/Surgical History:   There is no problem list on file for this patient.    No past medical history on file.    CC No referring provider defined for this encounter. on close of this encounter.

## 2023-04-21 NOTE — LETTER
4/21/2023       RE: Va Riddle  [address]  [place] [address]     Dear Colleague,    Thank you for referring your patient, Va Riddle, to the [facility] DERMATOLOGY CLINIC [place] at [facility]. Please see a copy of my visit note below.    [facility] Dermatology Note  Encounter Date: Apr 21, 2023  Store-and-Forward and Telephone ([phone] ). Location of teledermatologist: [facility] DERMATOLOGY CLINIC [place].  Start time: 7:28 End time: 7:50    Dermatology Problem List:  1. Hair Loss, outside biopsy on 5/16/22 at  dermatology showed Androgenetic Alopecia vs Dermal hypersensitivity   - Repeat biopsy 3/13/2023  - results reviewed today - see below  - Current tx: topical minoxidil, topical spironolactone   - Hair Metrix 3/13/2023  2. Abnormal iron levels, now OK based on recent results - see below  3. 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   Outside biopsy uploaded to media tab. Outside bx c/w androgenetic alopecia vs dermal hypersensitivity. Scalp on exam is pink and patient reports active burning sensation.   - Hair metrix performed 3/13/23  - Labs ordered: CBC, CMP, Vit D, TSH, Iron studies (ferritin, iron, tibc), androgen studies (free and total testosterone, DHEAS ), UA, rheumatology labs (see below)  - Continue minoxidil 5% foam to scalp nightly.   - Continue topical spironolactone   ,  - Epidermal nerve biopsy of the scalp today, no evidence of neuropathy, in fact, there is an increased number of epidermal nerve  "fibers, some of which cluster together; there is also significant expression of CGRP, minimal to no mast cell degranulation and no unusual Substance P expression       # Abnormal iron levels & low testosterone  - Planning to see endocrinology     # Dermatitis NOS    Exam at first visit was significant for  pink scaly papules on the trunk. Systemic symptoms reported by patient continue to include: joint pain, arm muscle weakness, SOB, malaise.  Per patient at last visit,she was worked up by rheum during which she was tested for lupus (her mother has lupus), but her workup was unremarkable.   - Patient will send over patch testing results. Record release signed but still do not have this information.   - f/u dermatomyositis panel, tryptase, SPEP, UA, JEREMY, ANCA, Ige, anti IgE. - UA with RBCs, TSH was normal as was protein electrophoresis,  Anti IgE antibody was elevated and tryptase was slightly elevated, ferritin, IgE and iron studies were normal as was the polymyositis panel, ANCA, JEREMY, Vitamin D was low normal at 27, androgens were OK, WBC was slightly elevated at 11, 900  - L trunk punch biopsy at last visit, what appeared to be a \"typical' lesion was biopsied but returned as a BCC which Va reports she has had treated. Since, patient has had 2 additional biopsies in [place] which she read the report in our virtual visit - both showed acantholysis suggestive of Darier's   - of note, photos patient brought in that reflect the rash at its worst are in chart.     Procedures Performed:    None as this was a virtual visit    Follow-up: Will coordinate with Dr. Brandon Evans in Neurology, [facility] [place], and based on this time/date will have patient also seen in Dermatology and Allergy  Will also request slides from [place] to review here at [facility] - those that are suggestive of Darier's     Staff:     Marlys Carbone MD  Professor and Chair  Department of Dermatology  [facility]" [place]    ____________________________________________    CC: No chief complaint on file.    HPI:  Ms. Va Riddle is a(n) 42 year old female who presents today as a return patient for review of all of her lab and biopsy results. She reports ongoing symptoms. Lab results are described above. Scalp biopsy showed:     Vertical sections demonstrate 4 hair follicles, one of which appear in  nonanagen phase. Sebaceous glands are retained, no scarring fibrous tracts are seen and the overlying epidermis appears normal.  Horizontal sections demonstrate between 24 and 27 hair follicles, depending on the anatomic level. At the level of the isthmus, 24  follicles are seen, 2 of which appear in telogen phase (approximately 5-10 % of hairs in nonanagen phase). At the level of the  infundibulum, 27 hairs are present, with 23 terminal and intermediate hairs and 4 vellus hairs (sngfhkez-ti-aynmeo ratio approximately  6 :1). Only minimal perivascular and perifollicular inflammation is noted. PAS is negative for fungal elements. Sebaceous glands are  retained, no scarring fibrous tracts are seen and the overlying epidermis appears largely normal.    Patient is otherwise feeling well, without additional skin concerns.    Labs Reviewed:  See above    Physical Exam:  Vitals: There were no vitals taken for this visit.  - Submitted photos of adequate quality were reviewed.     Medications:  Current Outpatient Medications   Medication     amphetamine-dextroamphetamine (ADDERALL) 20 MG tablet     atenolol (TENORMIN) 25 MG tablet     betamethasone dipropionate (DIPROSONE) 0.05 % external cream     clobetasol (TEMOVATE) 0.05 % external solution     ketoconazole (NIZORAL) 2 % external shampoo     linaclotide (LINZESS) 290 MCG capsule     MINOXIDIL FOR WOMEN EX     sucralfate (CARAFATE) 1 GM tablet     UBRELVY 100 MG tablet     XOLAIR 150 MG/ML SOSY injection     No current facility-administered medications for this visit.       Past Medical/Surgical History:   There is no problem list on file for this patient.    No past medical history on file.    CC No referring provider defined for this encounter. on close of this encounter.                                                Again, thank you for allowing me to participate in the care of your patient.      Sincerely,    Marlys Carbone MD

## 2023-04-21 NOTE — NURSING NOTE
Teledermatology Nurse Call Patients:     Are you in the [facility] at the time of the encounter? yes    Today's visit will be billed to you and your insurance.    A teledermatology visit is not as thorough as an in-person visit and the quality of the photograph sent may not be of the same quality as that taken by the dermatology clinic.      Chief Complaint   Patient presents with     Telephone     Test results      Seda Boucher on 4/21/2023 at 6:59 AM

## 2023-04-23 NOTE — PATIENT INSTRUCTIONS
!. For now, continue current treatment plan.  2. Check in with your primary care provider or urology regarding the red cells in your urine.  3. A message has been sent to neurology to contact you for a visit. Dermatology will make its follow-up visit around the same time neurology sees you.  4. Have asked Allergy to reach out for an initial virtual visit to review your IgE antibody level and current treatments with an inhaler, etc.  5. Have asked staff to help me find the biopsy reports you read to me during our visit.  6. Have asked staff to request your biopsy glass slides so these could also be read here.   7. Of note, iron studies are now normal which is good.  8 Your Vitamin D level is in the low normal range so recommend you take Vitamin D, at least 1000 international unit(s) daily.    We look forward to figuring out what is driving your skin problem!

## 2023-04-26 ENCOUNTER — TRANSFERRED RECORDS (OUTPATIENT)
Dept: HEALTH INFORMATION MANAGEMENT | Facility: CLINIC | Age: 43
End: 2023-04-26
Payer: COMMERCIAL

## 2023-05-01 ENCOUNTER — LAB REQUISITION (OUTPATIENT)
Dept: LAB | Facility: CLINIC | Age: 43
End: 2023-05-01
Payer: COMMERCIAL

## 2023-05-01 PROCEDURE — 88321 CONSLTJ&REPRT SLD PREP ELSWR: CPT | Performed by: DERMATOLOGY

## 2023-05-03 NOTE — TELEPHONE ENCOUNTER
FUTURE VISIT INFORMATION      FUTURE VISIT INFORMATION:    Date: 5.15.23    Time: 1:45    Location: [facility]  REFERRAL INFORMATION:    Referring provider:  Tona    Referring providers clinic:  Derm    Reason for visit/diagnosis  allergy consult, referred by Dr. Barrientos. Pt states she will be in [place] during virtual visit    RECORDS REQUESTED FROM:       Clinic name Comments Records Status Imaging Status   Derm 4.21.23, 3.13.23  [facility] Epic Epic

## 2023-05-04 ENCOUNTER — TELEPHONE (OUTPATIENT)
Dept: DERMATOLOGY | Facility: CLINIC | Age: 43
End: 2023-05-04

## 2023-05-04 NOTE — TELEPHONE ENCOUNTER
4/21/23: Virtual Visit  Follow-up: Will coordinate with Dr. Brandon Evans in Neurology, [facility], and based on this time/date will have patient also seen in Dermatology and Allergy  Will also request slides from [place] to review here at [facility] - those that are suggestive of Darier's -            Marlys Carbone MD  to Va Swetaaimee Riddle          4/23/23  1:50 PM  It was good to connect Friday. I have touched bases with the neurologist and he is looking at the schedule for a Monday or Tuesday appointment time. Once this is confirmed, I will work in the dermatology appointment and if possible, the allergy appointment .   Sincerely,  Marlys Carbone MD    Patient has not heard from [facility] for an appointment as of yet.    Outside slide request: patient notes it is from the following--I will forward info to Chirag Duncan  [facility]  [address] [place] [id number]    T: [phone]

## 2023-05-14 NOTE — PROGRESS NOTES
[facility] Dermato-allergology Note    Virtual visit: store and forward video ([employer] connected), start time: 1:50pm, end time: 2:40, date of images: during video  Encounter Date: May 15, 2023  ____________________________________________    CC: No chief complaint on file.      HPI:  (May 15, 2023)  Ms. Va Riddle is a(n) 42 year old female who presents today as new patient for allergy consultation      2 different kinds of rash.    First rash, maybe started ~7 years ago. Describes as scaly brown/pink dots on chest and backs. 5 years ago, became particularly bothersome, would have to wear different shirts and found herself having to cover rash up.. The lesions became angry and spread, recently has spread up/down to legs and all over back. Went to local dermatologist, thought it was dermatitis. Has been treated with cortisone, betamethasone, tretinoin, triamcinolone, different eczema lotions. Has tried Singulair, claritin, benadryl (currently itches with benadryl now). Started Xolair which initially seemed to suppress ~70% of the rash, however has become less efficacious over time and rash has not resolved. Dad and nieces/nephews have asthma. 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 Has had allergy testing in the past several years ago and received allergy shots.      Also has another rash, describes as big, red/purple, splotchy swelling typically localized in upper body up to neck. Feels prickly/staticky prior to onset. This rash tends to be triggered by heat, eating anything warm or in sun, emotional stimulus, high BP. It comes and goes, can happen up to several times a day. Cold water helps with rash. Does not improve with allergy medicines. Gets tickle in back of throat, won't go away.      Dad, not 100% sure is dad, pretty significant skin stuff. Liver spots. Warts, patches.    Laundry detergent: Gain, has used all different kinds; no  difference, no itching when put clothes on. Bleach slightly reactive. Soaps: bodywash dove sensitive, Aveeno oatmeal kind, whatever body washes. Didn't find too much. Does use spray tan pretty frequently; doesn't see much of a difference. Bumps do become more prominent after spray tan.    - otherwise feeling well in usual state of health    Physical exam:  General: In no acute distress, well-developed, well-nourished  Eyes: no conjunctivitis  ENT: no signs of rhinitis   Pulmonary: no wheezing or coughing  Skin: no active lesions in video    Past Medical History:   There is no problem list on file for this patient.    No past medical history on file.    Allergies:  Allergies   Allergen Reactions     Metoclopramide Hives     Also felt edgy and claustrophobic along with feeling itchy everywhere.  Doesn't ever want it again IV or oral     Sulfa Antibiotics Hives and Unknown     Cephalexin Other (See Comments)     Nitrofurantoin        Medications:  Current Outpatient Medications   Medication     amphetamine-dextroamphetamine (ADDERALL) 20 MG tablet     atenolol (TENORMIN) 25 MG tablet     betamethasone dipropionate (DIPROSONE) 0.05 % external cream     clobetasol (TEMOVATE) 0.05 % external solution     ketoconazole (NIZORAL) 2 % external shampoo     linaclotide (LINZESS) 290 MCG capsule     MINOXIDIL FOR WOMEN EX     sucralfate (CARAFATE) 1 GM tablet     UBRELVY 100 MG tablet     XOLAIR 150 MG/ML SOSY injection     No current facility-administered medications for this visit.       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 Patient has the following hobbies or non-occupational exposure work out, dance, play with kids, ride motorcycles    Family History:  No family history on file.    Previous Labs, Allergy Tests, Dermatopathology, Imaging:    Previous TRUE Test: 3/1/23:   1+ reactions:   - Nickel sulfate  Paraben Mix  Thimersal   Allergens showing irritant  reactions  2-bromo-2-nitropropene-1,3-diol    Referred By: No referring provider defined for this encounter.     Allergy Tests:    Past Allergy Test    Order for Future Allergy Testing:    [x] Outpatient  [] Inpatient: [facility]..../ Bed ....       Skin Atopy (atopic dermatitis) [x] Yes   [] No local derm says she has eczema  Contact allergies:   [x] Yes   [] No ..........   Hand eczema:   [x] Yes   [] No goldbond eczema helps, look crepey           Leading hand:   [] R   [] L       [] Ambidextrous         Drug allergies:        [x] Yes   [] No  Which? Extreme hives turned into SJS nitrofurantoin  Urticaria/Angioedema  [x] Yes   [] No .........  Food Allergy:  [] Yes   [x] No  which?......  Pets :  [x] Yes   [] No  Which?1 dog         [x]  Rhinitis   [x] Conjunctivitis   [x] Sinusitis   [] Polyposis   [x] Otitis (fluid/discharge behind ears)   [] Pharyngitis         [x]  Postnasal drip    []  none  Operations:   [] Tonsils   [] Septum   [] Sinus   [] Polyposis        [] Asthma bronchiale   [x] Coughing      []  none  Symptoms (mostly Rhinoconjunctivitis and Asthma) aggravated by:  Season   [] I   [] II   [] III   [x] IV   [x]V   [x]VI   []VII   []VIII   []IX   []X   []XI   []XII     [x] perennial   Day time      [] morning   [] noon      [] evening        [x] night    [x] whole day........  []  none  Location/changes    [] inside        [] outside   [] mountains    [] sea     [] others.............   [x]  none  Triggers, specific     [] animals     [] plants     [] dust              [] others ...........................    [x]  none  Triggers, others       [] work          [] psyche    [] sport            [] others .............................  [x]  none  Irritant                [] phys efforts [] smoke    [] heat/cold     [] odors  [x]others. Air turn on . []  none    Order for PATCH TESTS  Reason for tests (suspected allergy): eczematous rash on trunk and extremities and scalp = ACD?  Known previous allergies: had  true test in the past borderline pos to Nickel, Parabens. Thimerosal  Standardized panels  [x] Standard panel (40 tests)  [x] Preservatives & Antimicrobials (31 tests)  [x] Emulsifiers & Additives (25 tests)   [x] Perfumes/Flavours & Plants (25 tests)  [x] Hairdresser panel (12 tests)  [] Rubber Chemicals (22 tests)  [x] Plastics (26 tests)  [] Colorants/Dyes/Food additives (20 tests)  [] Metals (implants/dental) (24 tests)  [] Local anaesthetics/NSAIDs (13 tests)  [x] Antibiotics & Antimycotics (14 tests)   [] Corticosteroids (15 tests)   [] Photopatch test (62 tests)   [] others: ...      [x] Patient's own products: .spray tan    DO NOT test if chemical or biological identity is unknown!     always ask from patient the product information and safety sheets (MSDS)       Order for PRICK TESTS    Reason for tests (suspected allergy): atopy?  Known previous allergies: had IT at home and now tests negatives?    Standardized prick panels  [x] Atopic panel (20 tests)  [] Pediatric Panel (12 tests)  [] Milk, Meat, Eggs, Grains (20 tests)   [] Dust, Epithelia, Feathers (10 tests)  [] Fish, Seafood, Shellfish (17 tests)  [] Nuts, Beans (14 tests)  [] Spice, Vegetable, Fruit (17 tests)  [] Pollen Panel = Tree, Grass, Weed (24 tests)  [] Others: ...      [] Patient's own products: ...    DO NOT test if chemical or biological identity is unknown!     always ask from patient the product information and safety sheets (MSDS)     Standardized intradermal tests  [x] Penicillium notatum [x] Aspergillus fumigatus [x] House dust mites D.far & D. pteron  [] Cat    [] dog  [] Others: ...  [] Bee venom   [] Wasp venom  !!Specific protocol with dilutions!!       Order for Drug allergy tests (prick & Intradermal & patch tests)    [] Penicillin G  [] Ampicillin [x] Cefazolin   [x] Ceftriaxone   [] Ceftazidime  [x] Bactrim    [x] Others: Nitrofurantoin  Order for ... as test date    [] Patient needs consultation with Allergy team (changes of  tests may apply)  [x] Tests discussed with Allergy team (can have direct appointment for allergy tests)     ________________________________    Assessment & Plan:    ==> Final Diagnosis:     # Atopic predisposition? With    Seasonal RC and Rhinosinusitis in spring    Perennial RC and Sinusitis (more at night)    Family hx of Asthma    St after IT at home (what injected?)  * chronic illness with exacerbation, progression, side effects from treatment    # physical Urticaria triggered by heat and stress    Some improvement on Xolair  * chronic illness with exacerbation, progression, side effects from treatment    # recurrent pruritic, papular dermatitis on trunk and extremities  --> one biopsy eczematous  --> another biopsy ANDREINA. Adrianier?  DDx allergic contact dermatitis  DDx atopic dermatitis  DDx M. Dinesh Darier type  * chronic illness with exacerbation, progression, side effects from treatment     # hairloss with scalp irritation = allergic contact dermatitis?  * chronic illness with exacerbation, progression, side effects from treatment    # multiple drug allergies    Nitrofurantoin = hives and then turning into black erosions? No mucosal involvement    Sulfonamides = hives    Cephalexin = hives  * chronic illness with exacerbation, progression, side effects from treatment      These conclusions are made at the best of one's knowledge and belief based on the provided evidence such as patient's history and allergy test results and they can change over time or can be incomplete because of missing information's.    ==> Treatment Plan:  See later    Staff and Medical Student:  Provider    Seen and Staffed with Dr. Jose Martinez, MS4  [employer] [facility] [employer]      Staff Physician Comments:  I was present with the medical student who participated in the service and in the documentation of the note. I have verified the history and personally performed the physical exam and medical decision  making. I agree with the assessment and plan as documented in the note. I have reviewed and if necessary amended the note.      Jose Amezcua MD  Professor  Head of Dermato-Allergy Division  Department of Dermatology  [facility]      Follow-up in Derm-Allergy clinic for allergy tests as planned (has to be off systemic antihistamines for about 1 week --> topical or inhaled steroids OK as long not on the back)    I spent a total of 45 minutes with Va Riddle. This time was spent counseling the patient and/or coordinating care, explaining the allergy tests .

## 2023-05-15 ENCOUNTER — VIRTUAL VISIT (OUTPATIENT)
Dept: ALLERGY | Facility: CLINIC | Age: 43
End: 2023-05-15
Payer: COMMERCIAL

## 2023-05-15 ENCOUNTER — PRE VISIT (OUTPATIENT)
Dept: ALLERGY | Facility: CLINIC | Age: 43
End: 2023-05-15

## 2023-05-15 DIAGNOSIS — H10.10 SEASONAL AND PERENNIAL ALLERGIC RHINOCONJUNCTIVITIS: ICD-10-CM

## 2023-05-15 DIAGNOSIS — L50.9 URTICARIA: ICD-10-CM

## 2023-05-15 DIAGNOSIS — Z88.9 MULTIPLE DRUG ALLERGIES: Primary | ICD-10-CM

## 2023-05-15 DIAGNOSIS — J30.89 SEASONAL AND PERENNIAL ALLERGIC RHINOCONJUNCTIVITIS: ICD-10-CM

## 2023-05-15 DIAGNOSIS — L11.1 GROVER'S DISEASE: ICD-10-CM

## 2023-05-15 DIAGNOSIS — J30.2 SEASONAL AND PERENNIAL ALLERGIC RHINOCONJUNCTIVITIS: ICD-10-CM

## 2023-05-15 DIAGNOSIS — L20.89 OTHER ATOPIC DERMATITIS: ICD-10-CM

## 2023-05-15 DIAGNOSIS — J32.9 CHRONIC RHINOSINUSITIS: ICD-10-CM

## 2023-05-15 PROCEDURE — 99215 OFFICE O/P EST HI 40 MIN: CPT | Mod: VID | Performed by: DERMATOLOGY

## 2023-05-15 NOTE — LETTER
5/15/2023         RE: Va Riddle  [address] [place] [address]  [place] [address]        Dear Colleague,    Thank you for referring your patient, Va Riddle, to the [facility] [place]. Please see a copy of my visit note below.    [facility] Dermato-allergology Note    Virtual visit: store and forward video ([employer] connected), start time: 1:50pm, end time: 2:40, date of images: during video  Encounter Date: May 15, 2023  ____________________________________________    CC: No chief complaint on file.      HPI:  (May 15, 2023)  Ms. Va Riddle is a(n) 42 year old female who presents today as new patient for allergy consultation      2 different kinds of rash.    First rash, maybe started ~7 years ago. Describes as scaly brown/pink dots on chest and backs. 5 years ago, became particularly bothersome, would have to wear different shirts and found herself having to cover rash up.. The lesions became angry and spread, recently has spread up/down to legs and all over back. Went to local dermatologist, thought it was dermatitis. Has been treated with cortisone, betamethasone, tretinoin, triamcinolone, different eczema lotions. Has tried Singulair, claritin, benadryl (currently itches with benadryl now). Started Xolair which initially seemed to suppress ~70% of the rash, however has become less efficacious over time and rash has not resolved. Dad and nieces/nephews have asthma. 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 Has had allergy testing in the past several years ago and received allergy shots.      Also has another rash, describes as big, red/purple, splotchy swelling typically localized in upper body up to neck. Feels prickly/staticky prior to onset. This rash tends to be triggered by heat, eating anything warm or in sun, emotional stimulus, high BP. It comes and goes, can happen up to several  times a day. Cold water helps with rash. Does not improve with allergy medicines. Gets tickle in back of throat, won't go away.      Dad, not 100% sure is dad, pretty significant skin stuff. Liver spots. Warts, patches.    Laundry detergent: Gain, has used all different kinds; no difference, no itching when put clothes on. Bleach slightly reactive. Soaps: bodywash dove sensitive, Aveeno oatmeal kind, whatever body washes. Didn't find too much. Does use spray tan pretty frequently; doesn't see much of a difference. Bumps do become more prominent after spray tan.    - otherwise feeling well in usual state of health    Physical exam:  General: In no acute distress, well-developed, well-nourished  Eyes: no conjunctivitis  ENT: no signs of rhinitis   Pulmonary: no wheezing or coughing  Skin: no active lesions in video    Past Medical History:   There is no problem list on file for this patient.    No past medical history on file.    Allergies:  Allergies   Allergen Reactions     Metoclopramide Hives     Also felt edgy and claustrophobic along with feeling itchy everywhere.  Doesn't ever want it again IV or oral     Sulfa Antibiotics Hives and Unknown     Cephalexin Other (See Comments)     Nitrofurantoin        Medications:  Current Outpatient Medications   Medication     amphetamine-dextroamphetamine (ADDERALL) 20 MG tablet     atenolol (TENORMIN) 25 MG tablet     betamethasone dipropionate (DIPROSONE) 0.05 % external cream     clobetasol (TEMOVATE) 0.05 % external solution     ketoconazole (NIZORAL) 2 % external shampoo     linaclotide (LINZESS) 290 MCG capsule     MINOXIDIL FOR WOMEN EX     sucralfate (CARAFATE) 1 GM tablet     UBRELVY 100 MG tablet     XOLAIR 150 MG/ML SOSY injection     No current facility-administered medications for this visit.       Social History:  The patient  for "[employer]", process donations and construction. Patient has the following hobbies or non-occupational  exposure work out, dance, play with kids, ride motorcycles    Family History:  No family history on file.    Previous Labs, Allergy Tests, Dermatopathology, Imaging:    Previous TRUE Test: 3/1/23:   1+ reactions:   - Nickel sulfate  Paraben Mix  Thimersal   Allergens showing irritant reactions  2-bromo-2-nitropropene-1,3-diol    Referred By: No referring provider defined for this encounter.     Allergy Tests:    Past Allergy Test    Order for Future Allergy Testing:    [x] Outpatient  [] Inpatient: [facility]..../ Bed ....       Skin Atopy (atopic dermatitis) [x] Yes   [] No local derm says she has eczema  Contact allergies:   [x] Yes   [] No ..........   Hand eczema:   [x] Yes   [] No goldbond eczema helps, look crepey           Leading hand:   [] R   [] L       [] Ambidextrous         Drug allergies:        [x] Yes   [] No  Which? Extreme hives turned into SJS nitrofurantoin  Urticaria/Angioedema  [x] Yes   [] No .........  Food Allergy:  [] Yes   [x] No  which?......  Pets :  [x] Yes   [] No  Which?1 dog         [x]  Rhinitis   [x] Conjunctivitis   [x] Sinusitis   [] Polyposis   [x] Otitis (fluid/discharge behind ears)   [] Pharyngitis         [x]  Postnasal drip    []  none  Operations:   [] Tonsils   [] Septum   [] Sinus   [] Polyposis        [] Asthma bronchiale   [x] Coughing      []  none  Symptoms (mostly Rhinoconjunctivitis and Asthma) aggravated by:  Season   [] I   [] II   [] III   [x] IV   [x]V   [x]VI   []VII   []VIII   []IX   []X   []XI   []XII     [x] perennial   Day time      [] morning   [] noon      [] evening        [x] night    [x] whole day........  []  none  Location/changes    [] inside        [] outside   [] mountains    [] sea     [] others.............   [x]  none  Triggers, specific     [] animals     [] plants     [] dust              [] others ...........................    [x]  none  Triggers, others       [] work          [] psyche    [] sport            [] others  .............................  [x]  none  Irritant                [] phys efforts [] smoke    [] heat/cold     [] odors  [x]others. Air turn on . []  none    Order for PATCH TESTS  Reason for tests (suspected allergy): eczematous rash on trunk and extremities and scalp = ACD?  Known previous allergies: had true test in the past borderline pos to Nickel, Parabens. Thimerosal  Standardized panels  [x] Standard panel (40 tests)  [x] Preservatives & Antimicrobials (31 tests)  [x] Emulsifiers & Additives (25 tests)   [x] Perfumes/Flavours & Plants (25 tests)  [x] Hairdresser panel (12 tests)  [] Rubber Chemicals (22 tests)  [x] Plastics (26 tests)  [] Colorants/Dyes/Food additives (20 tests)  [] Metals (implants/dental) (24 tests)  [] Local anaesthetics/NSAIDs (13 tests)  [x] Antibiotics & Antimycotics (14 tests)   [] Corticosteroids (15 tests)   [] Photopatch test (62 tests)   [] others: ...      [x] Patient's own products: .spray tan    DO NOT test if chemical or biological identity is unknown!     always ask from patient the product information and safety sheets (MSDS)       Order for PRICK TESTS    Reason for tests (suspected allergy): atopy?  Known previous allergies: had IT at home and now tests negatives?    Standardized prick panels  [x] Atopic panel (20 tests)  [] Pediatric Panel (12 tests)  [] Milk, Meat, Eggs, Grains (20 tests)   [] Dust, Epithelia, Feathers (10 tests)  [] Fish, Seafood, Shellfish (17 tests)  [] Nuts, Beans (14 tests)  [] Spice, Vegetable, Fruit (17 tests)  [] Pollen Panel = Tree, Grass, Weed (24 tests)  [] Others: ...      [] Patient's own products: ...    DO NOT test if chemical or biological identity is unknown!     always ask from patient the product information and safety sheets (MSDS)     Standardized intradermal tests  [x] Penicillium notatum [x] Aspergillus fumigatus [x] House dust mites D.far & D. pteron  [] Cat    [] dog  [] Others: ...  [] Bee venom   [] Wasp venom  !!Specific  protocol with dilutions!!       Order for Drug allergy tests (prick & Intradermal & patch tests)    [] Penicillin G  [] Ampicillin [x] Cefazolin   [x] Ceftriaxone   [] Ceftazidime  [x] Bactrim    [x] Others: Nitrofurantoin  Order for ... as test date    [] Patient needs consultation with Allergy team (changes of tests may apply)  [x] Tests discussed with Allergy team (can have direct appointment for allergy tests)     ________________________________    Assessment & Plan:    ==> Final Diagnosis:     # Atopic predisposition? With    Seasonal RC and Rhinosinusitis in spring    Perennial RC and Sinusitis (more at night)    Family hx of Asthma    St after IT at home (what injected?)  * chronic illness with exacerbation, progression, side effects from treatment    # physical Urticaria triggered by heat and stress    Some improvement on Xolair  * chronic illness with exacerbation, progression, side effects from treatment    # recurrent pruritic, papular dermatitis on trunk and extremities  --> one biopsy eczematous  --> another biopsy TIARRA Vann?  DDx allergic contact dermatitis  DDx atopic dermatitis  DDx TIARRA Vann type  * chronic illness with exacerbation, progression, side effects from treatment     # hairloss with scalp irritation = allergic contact dermatitis?  * chronic illness with exacerbation, progression, side effects from treatment    # multiple drug allergies    Nitrofurantoin = hives and then turning into black erosions? No mucosal involvement    Sulfonamides = hives    Cephalexin = hives  * chronic illness with exacerbation, progression, side effects from treatment      These conclusions are made at the best of one's knowledge and belief based on the provided evidence such as patient's history and allergy test results and they can change over time or can be incomplete because of missing information's.    ==> Treatment Plan:  See later    Staff and Medical Student:  Provider    Seen and Staffed with   Jose Martinez, MS4  [facility] [employer]      Staff Physician Comments:  I was present with the medical student who participated in the service and in the documentation of the note. I have verified the history and personally performed the physical exam and medical decision making. I agree with the assessment and plan as documented in the note. I have reviewed and if necessary amended the note.      Jose Amezcua MD  Professor  Head of Dermato-Allergy Division  Department of Dermatology  [facility]      Follow-up in Derm-Allergy clinic for allergy tests as planned (has to be off systemic antihistamines for about 1 week --> topical or inhaled steroids OK as long not on the back)    I spent a total of 45 minutes with Va Riddle. This time was spent counseling the patient and/or coordinating care, explaining the allergy tests .        Again, thank you for allowing me to participate in the care of your patient.        Sincerely,        Jose Amezcua MD

## 2023-05-15 NOTE — NURSING NOTE
Dermatology Rooming Note    Va Riddle's goals for this visit include:   Chief Complaint   Patient presents with     Allergy Consult     Va is being seen today for allergy consult - referred by bubba.. skin rash      JUDE Barrett

## 2023-06-05 NOTE — TELEPHONE ENCOUNTER
Marlys Carbone MD  You Yesterday (8:21 AM)     ALEC  Just sent an email to Dr. Evans and copied you in regards to the status of this patient. I should see her around the time he sees her or when she sees Dr. Huang so she does not need to make so many trips.   Best,

## 2023-06-14 LAB
PATH REPORT.COMMENTS IMP SPEC: NORMAL
PATH REPORT.FINAL DX SPEC: NORMAL
PATH REPORT.GROSS SPEC: NORMAL
PATH REPORT.MICROSCOPIC SPEC OTHER STN: NORMAL
PATH REPORT.RELEVANT HX SPEC: NORMAL
PATH REPORT.RELEVANT HX SPEC: NORMAL
PATH REPORT.SITE OF ORIGIN SPEC: NORMAL

## 2023-07-10 ENCOUNTER — TRANSFERRED RECORDS (OUTPATIENT)
Dept: HEALTH INFORMATION MANAGEMENT | Facility: CLINIC | Age: 43
End: 2023-07-10

## 2023-08-10 ENCOUNTER — TELEPHONE (OUTPATIENT)
Dept: ALLERGY | Facility: CLINIC | Age: 43
End: 2023-08-10
Payer: COMMERCIAL

## 2023-08-10 DIAGNOSIS — Z88.9 DRUG ALLERGY: Primary | ICD-10-CM

## 2023-08-10 RX ORDER — NITROFURANTOIN 25; 75 MG/1; MG/1
100 CAPSULE ORAL ONCE
Status: COMPLETED | OUTPATIENT
Start: 2023-08-14 | End: 2023-08-14

## 2023-08-10 RX ORDER — NITROFURANTOIN 25; 75 MG/1; MG/1
100 CAPSULE ORAL ONCE
Status: ACTIVE | OUTPATIENT
Start: 2023-08-10

## 2023-08-10 NOTE — TELEPHONE ENCOUNTER
Standardized panels  [x] Standard panel (40 tests)  [x] Preservatives & Antimicrobials (31 tests)  [x] Emulsifiers & Additives (25 tests)   [x] Perfumes/Flavours & Plants (25 tests)  [x] Hairdresser panel (12 tests)  [] Rubber Chemicals (22 tests)  [x] Plastics (26 tests)  [] Colorants/Dyes/Food additives (20 tests)  [] Metals (implants/dental) (24 tests)  [] Local anaesthetics/NSAIDs (13 tests)  [x] Antibiotics & Antimycotics (14 tests)   [] Corticosteroids (15 tests)   [] Photopatch test (62 tests)   [] others: ...                         [x] Patient's own products: .spray tan  DO NOT test if chemical or biological identity is unknown!   always ask from patient the product information and safety sheets (MSDS)     STANDARD Series                                          # Substance 2 days 4 days remarks     1 Francisco Mix [C] - -       2 Colophony - -       3  2-Mercaptobenzothiazole  - -       4 Methylisothiazolinone - -       5 Carba Mix - -       6 Thiuram Mix [A] - -       7 Bisphenol A Epoxy Resin - -       8 J-Hifa-Iozhlzellaf-Formaldehyde Resin - -       9 Mercapto Mix [A] - -       10 Black Rubber Mix- PPD [B] - -       11 Potassium Dichromate  -  -       12 Balsam of [place] (Myroxylon Pereirae Resin) - -       13 Nickel Sulphate Hexahydrate - -       14 Mixed Dialkyl Thiourea - -       15 Paraben Mix [B] - -       16 Methyldibromo Glutaronitrile - -       17 Fragrance Mix 8% - -       18 2-Bromo-2-Nitropropane-1,3-Diol (Bronopol) CT - -       19 Lyral - -       20 Tixocortol-21- Pivalate CT - -       21 Diazolidinyl urea (Germall II) - -        22 Methyl Methacrylate - -       23 Cobalt (II) Chloride Hexahydrate - -       24 Fragrance Mix II  - -       25 Compositae Mix - -       26 Benzoyl Peroxide - -       27 Bacitracin - -       28 Formaldehyde - -       29 Methylchloroisothiazolinone / Methylisothiazolinone - -       30 Corticosteroid Mix CT - -       31 Sodium Lauryl Sulfate - -       32 Lanolin Alcohol -  -       33 Turpentine - -       34 Cetylstearylalcohol - -       35 Chlorhexidine Dicluconate - -       36 Budesonide - -       37 Imidazolidinyl Urea  - -       38 Ethyl-2 Cyanoacrylate - -       39 Quaternium 15 (Dowicil 200) - -       40 Decyl Glucoside - -      PRESERVATIVES & ANTIMICROBIALS        # Substance 2 days 4 days remarks   41 1  1,2-Benzisothiazoline-3-One, Sodium Salt - -     2  1,3,5-Martinez (2-Hydroxyethyl) - Hexahydrotriazine (Grotan BK) - -     3 5-Unadyvmgzgtsx-1-Nitro-1, 3-Propanediol - -     4  3, 4, 4' - Triclocarban - -    45 5 4 - Chloro - 3 - Cresol - -     6 4 - Chloro - 4 - Xylenol (PCMX) - -     7 7-Ethylbicyclooxazolidine (Bioban [id number]) - -     8 Benzalkonium Chloride CT - -     9 Benzyl Alcohol - -    50 10 Cetalkonium Chloride - -     11 Cetylpyrimidine Chloride  - -     12 Chloroacetamide - -     13 DMDM Hydantoin - -     14 Glutaraldehyde - -    55 15 Triclosan - -     16 Glyoxal Trimeric Dihydrate - -     17 Iodopropynyl Butylcarbamate - -     18 Octylisothiazoline - -     19 Bithionol CT - -    60 20 Bioban P 1487 (Nitrobutyl) Morpholine/(Ethylnitro-Trimethylene) Dimorpholine - -     21 Phenoxyethanol - -     22 Phenyl Salicylate - -     23 Povidone Iodine - -     24 Sodium Benzoate - -    65 25 Sodium Disulfite - -     26 Sorbic Acid - -     27 Thimerosal - -     28 Melamine Formaldehyde Resin - -     29 Ethylenediamine Dihydrochloride - -      Parabens      70 30 Butyl-P-Hydroxybenzoate - -     31 Ethyl-P-Hydroxybenzoate - -     32 Methyl-P-Hydroxybenzoate - -    73 33 Propyl-P-Hydroxybenzoate - -     EMULSIFIERS & ADDITIVES       # Substance 2 days 4 days remarks   74 1 Polyethylene Glycol-400 - -    75 2 Cocamidopropyl Betaine - -     3 Amerchol L101 - -     4 Propylene Glycol - -     5 Triethanolamine - -     6 Sorbitane Sesquiolate CT - -    80 7 Isopropylmyristate - -     8 Polysorbate 80 CT - -     9 Amidoamine   (Stearamidopropyl Dimethylamine) - -     10 Oleamidopropyl  Dimethylamine - -     11 Lauryl Glucoside - -    85 12 Coconut Diethanolamide  - -     13 2-Hydroxy-4-Methoxy Benzophenone (Oxybenzone) - -     14 Benzophenone-4 (Sulisobenzon) - -     15 Propolis - -     16 Dexpanthenol - -    90 17 Abitol - -     18 Tert-Butylhydroquinone - -     19 Benzyl Salicylate - -     20 Dimethylaminopropylamin (DMPA) - -     21 Zinc Pyrithione (Zinc Omadine)  - -    95 22 Martinez(Hydroxymethyl) Nitromethane  - -      Antioxidant       23 Dodecyl Gallate - -     24 Butylhydroxyanisole (BHA) - -     25 Butylhydroxytoluene (BHT) - -     26 Di-Alpha-Tocopherol (Vit E) - -    100 27 Propyl Gallate - -     PERFUMES, FLAVORS & PLANTS        # Substance 2 days 4 days remarks   101 1 Benzyl Cinnamate - -     2 Di-Limonene (Dipentene) - -     3 Cananga Odorata (Aldo Carlson) (I) - -     4 Lichen Acid Mix - -    105 5 Mentha Piperita Oil (Peppermint Oil) - -     6 Sesquiterpenelactone mix - -     7 Tea Tree Oil, Oxidized - -     8 Wood Tar Mix - -     9 Abietic Acid - -    110 10 Lavendula Angustifolia Oil (Lavender Oil) - -     11 Fragrance mix II CT * 14% - -      Fragrance Mix I       12 Oakmoss Absolute - -     13 Eugenol - -     14 Geraniol - -    115 15 Hydroxycitronellal - -     16 Isoeugenol - -     17 Cinnamic Aldehyde - -     18 Cinnamic Alcohol  - -      Fragrance mix II       19 Citronellol - -    120 20 Alpha-Hexylcinnamic Aldehyde    - -     21 Citral - -     22 Farnesol - -    123 23 Coumarin - -    Hexylcinnamic aldehyde, Coumarin, Farnesol, Hydroxyisohexy3-cyclohexene carboxaldehyde, citral, citrolellol  HAIRDRESSER        # Substance 2 days 4 days remarks   124 1 P-Phenylenediamine -  -    125 2 P-Toluenediamine Sulphate  -  -     3 Ammonium Thioglycolate - -     4 Ammonium Persulfate - -     5 Resorcinol - -     6 3-Aminophenol - -    130 7 P-Aminophenol - -     8 Glyceryl Monothioglycolate - -    132 9 Pyrogallol - -     PLASTICS (Acrylates/Epoxy Systems)       # Substance 2 days 4  days remarks     Acrylates - -    133 1 2-Hydroxyethyl Methacrylate (HEMA) - -     2 1,4-Butandioldimethacrylate (BUDMA) - -    135 3  2-Ethylhexyl Acrylate - -     4 Bisphenol-A-Dimethacrylate  - -     5 Diurethane-Dimethacrylate - -     6 Ethyleneglycoldimethacrylate (EGDMA) - -     7 Pentaerythritoltriacrylate (KEITH) - -    140 8 Triethylene Glycol Dimethacrylate (TEGDMA) - -      Synthetic material/additives        9 E-Qmye-Wicgysjkors - -     10 Tricresyl Phosphate - -     11 4-Sihu-Wudhqcfazoeeu - -     12 Dioctyl phtalate(DEHP,DOP) / (Dimethylhexylphthalate)  - -    145 13 Dibutylphthalate - -     14 Dimethylphthalate - -     15 Toluene-2,4-Diisocyanate - -     16 Diphenylmethane-4,4''-Diisocyanate - -      EPOXY RESIN SYSTEMS        Reactive Solvents - -     17 Cresyl Glycidyl Ether - -    150 18 Butyl Glycidyl Ether - -     19 Phenyl Glycidyl Ether - -     20 1,4-Butanediol Diglycidyl Ether - -     21 1,6-Hexanediole Diglycidyl Ether - -      Hardener / Accelerator - -     22 Triethylenetetramine - -    155 23 Diethylenetriamine - -     24 Isophorone Diamine (IPD) - -     25 N,N-Dimethyl-P-Toluidine - -    158 26 Isobornyl Acrylate - -     ANTIBIOTICS & ANTIMYCOTICS    # Substance 2 days 4 days remarks   159 1 Erythromycin - -    160 2 Framycetin Sulfate - -     3 Fusidic Acid Sodium Salt - -     4 Gentamicin Sulfate - -     5 Neomycin Sulfate - -     6 Oxytetracycline  - -    165 7 Polymyxin B Sulfate - -     8 Tetracycline-HCL - -     9 Sulfanilamide - -     10 Metronidazole - -     11 Nitrofurazone - -    170 12 Nystatin - -     13 Clotrimazole - -     14 Clioquinol 5% - -     15 Miconazole  - -    174 16 Tobramycine        OTHER PRODUCTS        # Substance Conc  % solv 2 days 4 days remarks    1          2          3          4          5          6          7          8          9          10           Patients own products:  è DO NOT test if chemical or biological identity is unknown!   - always ask  "from patient the product information and safety sheets and consult with the physician   - check neutral pH with pH indicator paper (do not test pH below 5 or over 8 or consult with physician)  - leave-on cosmetics can be tested \"as is\"  - rinse-off products have to be diluted with water, buffer, olive oil or paraffin (discuss with physician)  à remember:   - non-specific toxic/corrosive or biological reactions can occur  - tests with patients own products are not standardized and test conditions are not optimized for patch tests. Whenever possible test with standardized test series and correlate use of product with the result of the patch tests  - if not sure if compounds can be tested under occlusion in patch tests consider open application tests   Results of patch tests:                         Interpretation:  - Negative                    A    = Allergic      (+) Erythema    TI   = Toxic/irritant   + E + Infiltration    RaP = Relevance at Present     ++ E/I + Papulovesicle   Rpr  = Relevance Previously     +++ E/I/P + Blister     nR   = No Relevance          "

## 2023-08-11 NOTE — PROGRESS NOTES
Formerly [facility] Dermato-allergology Note  Office visit  Encounter Date: Aug 14, 2023  ____________________________________________    CC: No chief complaint on file.      HPI:  (Aug 14, 2023)  Ms. Va Riddle is a(n) 43 year old female who presents today as a return patient for allergy tests as planned  - Follow-up in Derm-Allergy clinic for allergy tests as planned (has to be off systemic antihistamines for about 1 week --> topical or inhaled steroids OK as long not on the back)   - otherwise feeling well in usual state of health    Physical exam:  General: In no acute distress, well-developed, well-nourished  Eyes: no conjunctivitis  ENT: no signs of rhinitis   Pulmonary: no wheezing or coughing  Skin: Focused examination of the skin on test sites was performed = see test results below  No active eczematous skin lesions on tests sites, particularly back  On the entire back colorful tattoo, have to test over it    Earlier History and Allergy exams:  (May 15, 2023)  2 different kinds of rash.  First rash, maybe started ~7 years ago. Describes as scaly brown/pink dots on chest and backs. 5 years ago, became particularly bothersome, would have to wear different shirts and found herself having to cover rash up.. The lesions became angry and spread, recently has spread up/down to legs and all over back. Went to local dermatologist, thought it was dermatitis. Has been treated with cortisone, betamethasone, tretinoin, triamcinolone, different eczema lotions. Has tried Singulair, claritin, benadryl (currently itches with benadryl now). Started Xolair which initially seemed to suppress ~70% of the rash, however has become less efficacious over time and rash has not resolved. Dad and nieces/nephews have asthma. 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 Has had allergy testing in the past several years ago and received allergy shots.    Also has another rash,  describes as big, red/purple, splotchy swelling typically localized in upper body up to neck. Feels prickly/staticky prior to onset. This rash tends to be triggered by heat, eating anything warm or in sun, emotional stimulus, high BP. It comes and goes, can happen up to several times a day. Cold water helps with rash. Does not improve with allergy medicines. Gets tickle in back of throat, won't go away.    Dad, not 100% sure is dad, pretty significant skin stuff. Liver spots. Warts, patches.    Laundry detergent: Gain, has used all different kinds; no difference, no itching when put clothes on. Bleach slightly reactive. Soaps: bodywash dove sensitive, Aveeno oatmeal kind, whatever body washes. Didn't find too much. Does use spray tan pretty frequently; doesn't see much of a difference. Bumps do become more prominent after spray tan.      Past Medical History:   There is no problem list on file for this patient.    No past medical history on file.    Allergies:  Allergies   Allergen Reactions    Metoclopramide Hives     Also felt edgy and claustrophobic along with feeling itchy everywhere.  Doesn't ever want it again IV or oral    Sulfa Antibiotics Hives and Unknown    Cephalexin Other (See Comments)    Nitrofurantoin        Medications:  Current Outpatient Medications   Medication    amphetamine-dextroamphetamine (ADDERALL) 20 MG tablet    atenolol (TENORMIN) 25 MG tablet    betamethasone dipropionate (DIPROSONE) 0.05 % external cream    clobetasol (TEMOVATE) 0.05 % external solution    ketoconazole (NIZORAL) 2 % external shampoo    linaclotide (LINZESS) 290 MCG capsule    MINOXIDIL FOR WOMEN EX    sucralfate (CARAFATE) 1 GM tablet    UBRELVY 100 MG tablet    XOLAIR 150 MG/ML SOSY injection     Current Facility-Administered Medications   Medication    nitroFURantoin macrocrystal-monohydrate (MACROBID) capsule 100 mg       Social History:  The patient  for [employer], process donations and  construction. Patient has the following hobbies or non-occupational exposure work out, dance, play with kids, ride motorcycles    Family History:  No family history on file.    Previous Labs, Allergy Tests, Dermatopathology, Imaging:    Previous TRUE Test: 3/1/23:   1+ reactions:   - Nickel sulfate  Paraben Mix  Thimersal   Allergens showing irritant reactions  2-bromo-2-nitropropene-1,3-diol    Referred By: No referring provider defined for this encounter.     Allergy Tests:    Past Allergy Test    Order for Future Allergy Testing:    [x] Outpatient  [] Inpatient: [facility]..../ Bed ....       Skin Atopy (atopic dermatitis) [x] Yes   [] No local derm says she has eczema  Contact allergies:   [x] Yes   [] No ..........   Hand eczema:   [x] Yes   [] No goldbond eczema helps, look crepey           Leading hand:   [] R   [] L       [] Ambidextrous         Drug allergies:        [x] Yes   [] No  Which? Extreme hives turned into SJS nitrofurantoin  Urticaria/Angioedema  [x] Yes   [] No .........  Food Allergy:  [] Yes   [x] No  which?......  Pets :  [x] Yes   [] No  Which?1 dog         [x]  Rhinitis   [x] Conjunctivitis   [x] Sinusitis   [] Polyposis   [x] Otitis (fluid/discharge behind ears)   [] Pharyngitis         [x]  Postnasal drip    []  none  Operations:   [] Tonsils   [] Septum   [] Sinus   [] Polyposis        [] Asthma bronchiale   [x] Coughing      []  none  Symptoms (mostly Rhinoconjunctivitis and Asthma) aggravated by:  Season   [] I   [] II   [] III   [x] IV   [x]V   [x]VI   []VII   []VIII   []IX   []X   []XI   []XII     [x] perennial   Day time      [] morning   [] noon      [] evening        [x] night    [x] whole day........  []  none  Location/changes    [] inside        [] outside   [] mountains    [] sea     [] others.............   [x]  none  Triggers, specific     [] animals     [] plants     [] dust              [] others ...........................    [x]  none  Triggers, others       [] work           [] psyche    [] sport            [] others .............................  [x]  none  Irritant                [] phys efforts [] smoke    [] heat/cold     [] odors  [x]others. Air turn on . []  none    Order for PATCH TESTS  Reason for tests (suspected allergy): eczematous rash on trunk and extremities and scalp = ACD?  Known previous allergies: had true test in the past borderline pos to Nickel, Parabens. Thimerosal  Standardized panels  [x] Standard panel (40 tests)  [x] Preservatives & Antimicrobials (31 tests)  [x] Emulsifiers & Additives (25 tests)   [x] Perfumes/Flavours & Plants (25 tests)  [x] Hairdresser panel (12 tests)  [] Rubber Chemicals (22 tests)  [x] Plastics (26 tests)  [] Colorants/Dyes/Food additives (20 tests)  [] Metals (implants/dental) (24 tests)  [] Local anaesthetics/NSAIDs (13 tests)  [x] Antibiotics & Antimycotics (14 tests)   [] Corticosteroids (15 tests)   [] Photopatch test (62 tests)   [] others: ...      [x] Patient's own products: .spray tan  DO NOT test if chemical or biological identity is unknown!   always ask from patient the product information and safety sheets (MSDS)       Order for PRICK TESTS    Reason for tests (suspected allergy): atopy?  Known previous allergies: had IT at home and now tests negatives?    Standardized prick panels  [x] Atopic panel (20 tests)  [] Pediatric Panel (12 tests)  [] Milk, Meat, Eggs, Grains (20 tests)   [] Dust, Epithelia, Feathers (10 tests)  [] Fish, Seafood, Shellfish (17 tests)  [] Nuts, Beans (14 tests)  [] Spice, Vegetable, Fruit (17 tests)  [] Pollen Panel = Tree, Grass, Weed (24 tests)  [] Others: ...      [] Patient's own products: ...  DO NOT test if chemical or biological identity is unknown!   always ask from patient the product information and safety sheets (MSDS)     Standardized intradermal tests  [x] Penicillium notatum [x] Aspergillus fumigatus [x] House dust mites D.far & D. pteron  [] Cat    [] dog  [] Others: ...  [] Bee  venom   [] Wasp venom  !!Specific protocol with dilutions!!       Order for Drug allergy tests (prick & Intradermal & patch tests)    [] Penicillin G  [] Ampicillin [x] Cefazolin   [x] Ceftriaxone   [] Ceftazidime  [x] Bactrim    [x] Others: Nitrofurantoin  Order for ... as test date    ________________________________  RESULTS & EVALUATION of PATCH TESTS    Aug 14, 2023 application of patch tests:    Patch test readings after     [x] 2 days, [] 3 days [x] 4 days, [] 5 days,  Other duration: ...    STANDARD Series                                          # Substance 2 days 4 days remarks     1 Francisco Mix [C] - -       2 Colophony - -       3  2-Mercaptobenzothiazole  - -       4 Methylisothiazolinone - -       5 Carba Mix - -       6 Thiuram Mix [A] - -       7 Bisphenol A Epoxy Resin - -       8 H-Pnkh-Hctgxbwrbwx-Formaldehyde Resin - -       9 Mercapto Mix [A] - -       10 Black Rubber Mix- PPD [B] - -       11 Potassium Dichromate  -  -       12 Balsam of [place] (Myroxylon Pereirae Resin) - -       13 Nickel Sulphate Hexahydrate - -       14 Mixed Dialkyl Thiourea - -       15 Paraben Mix [B] - -       16 Methyldibromo Glutaronitrile - -       17 Fragrance Mix 8% - -       18 2-Bromo-2-Nitropropane-1,3-Diol (Bronopol) CT - -       19 Lyral - -       20 Tixocortol-21- Pivalate CT - -       21 Diazolidinyl urea (Germall II) - -        22 Methyl Methacrylate - -       23 Cobalt (II) Chloride Hexahydrate - -       24 Fragrance Mix II  - -       25 Compositae Mix - -       26 Benzoyl Peroxide - -       27 Bacitracin - -       28 Formaldehyde - -       29 Methylchloroisothiazolinone / Methylisothiazolinone - -       30 Corticosteroid Mix CT - -       31 Sodium Lauryl Sulfate - -       32 Lanolin Alcohol - -       33 Turpentine - -       34 Cetylstearylalcohol - -       35 Chlorhexidine Dicluconate - -       36 Budesonide - -       37 Imidazolidinyl Urea  - -       38 Ethyl-2 Cyanoacrylate - -       39 Quaternium 15  (Dowicil 200) - -       40 Decyl Glucoside - -      PRESERVATIVES & ANTIMICROBIALS        # Substance 2 days 4 days remarks   41 1  1,2-Benzisothiazoline-3-One, Sodium Salt - -     2  1,3,5-Martinez (2-Hydroxyethyl) - Hexahydrotriazine (Grotan BK) - -     3 3-Rorfmljwklwyv-9-Nitro-1, 3-Propanediol NA NA     4  3, 4, 4' - Triclocarban - -    45 5 4 - Chloro - 3 - Cresol - -     6 4 - Chloro - 4 - Xylenol (PCMX) - -     7 7-Ethylbicyclooxazolidine (Bioban [id number]) - -     8 Benzalkonium Chloride CT - -     9 Benzyl Alcohol - -    50 10 Cetalkonium Chloride - -     11 Cetylpyrimidine Chloride  - -     12 Chloroacetamide - -     13 DMDM Hydantoin - -     14 Glutaraldehyde - -    55 15 Triclosan - -     16 Glyoxal Trimeric Dihydrate - -     17 Iodopropynyl Butylcarbamate - -     18 Octylisothiazoline - -     19 Bithionol CT - -    60 20 Bioban P 1487 (Nitrobutyl) Morpholine/(Ethylnitro-Trimethylene) Dimorpholine - -     21 Phenoxyethanol - -     22 Phenyl Salicylate - -     23 Povidone Iodine - -     24 Sodium Benzoate - -    65 25 Sodium Disulfite - -     26 Sorbic Acid - -     27 Thimerosal - -     28 Melamine Formaldehyde Resin - -     29 Ethylenediamine Dihydrochloride - -      Parabens      70 30 Butyl-P-Hydroxybenzoate - -     31 Ethyl-P-Hydroxybenzoate - -     32 Methyl-P-Hydroxybenzoate - -    73 33 Propyl-P-Hydroxybenzoate - -     EMULSIFIERS & ADDITIVES       # Substance 2 days 4 days remarks   74 1 Polyethylene Glycol-400 - -    75 2 Cocamidopropyl Betaine - -     3 Amerchol L101 - -     4 Propylene Glycol - -     5 Triethanolamine - -     6 Sorbitane Sesquiolate CT - -    80 7 Isopropylmyristate - -     8 Polysorbate 80 CT - -     9 Amidoamine   (Stearamidopropyl Dimethylamine) - -     10 Oleamidopropyl Dimethylamine - -     11 Lauryl Glucoside - -    85 12 Coconut Diethanolamide  - -     13 2-Hydroxy-4-Methoxy Benzophenone (Oxybenzone) - -     14 Benzophenone-4 (Sulisobenzon) - -     15 Propolis - -     16  Dexpanthenol - -    90 17 Abitol - -     18 Tert-Butylhydroquinone - -     19 Benzyl Salicylate - -     20 Dimethylaminopropylamin (DMPA) - -     21 Zinc Pyrithione (Zinc Omadine)  - -    95 22 Martinez(Hydroxymethyl) Nitromethane  - -      Antioxidant       23 Dodecyl Gallate - -     24 Butylhydroxyanisole (BHA) - -     25 Butylhydroxytoluene (BHT) - -     26 Di-Alpha-Tocopherol (Vit E) - -    100 27 Propyl Gallate - -     PERFUMES, FLAVORS & PLANTS        # Substance 2 days 4 days remarks   101 1 Benzyl Cinnamate - -     2 Di-Limonene (Dipentene) - -     3 Cananga Odorata (Aldo Carlson) (I) - -     4 Lichen Acid Mix - -    105 5 Mentha Piperita Oil (Peppermint Oil) - -     6 Sesquiterpenelactone mix - -     7 Tea Tree Oil, Oxidized - -     8 Wood Tar Mix - -     9 Abietic Acid - -    110 10 Lavendula Angustifolia Oil (Lavender Oil) - -     11 Fragrance mix II CT * 14% - -      Fragrance Mix I       12 Oakmoss Absolute - -     13 Eugenol - -     14 Geraniol - -    115 15 Hydroxycitronellal - -     16 Isoeugenol - -     17 Cinnamic Aldehyde - -     18 Cinnamic Alcohol  - -      Fragrance mix II       19 Citronellol - -    120 20 Alpha-Hexylcinnamic Aldehyde    - -     21 Citral - -     22 Farnesol - -    123 23 Coumarin - -    Hexylcinnamic aldehyde, Coumarin, Farnesol, Hydroxyisohexy3-cyclohexene carboxaldehyde, citral, citrolellol  HAIRDRESSER        # Substance 2 days 4 days remarks   124 1 P-Phenylenediamine -  -    125 2 P-Toluenediamine Sulphate  -  -     3 Ammonium Thioglycolate - -     4 Ammonium Persulfate - -     5 Resorcinol - -     6 3-Aminophenol - -    130 7 P-Aminophenol - -     8 Glyceryl Monothioglycolate - -    132 9 Pyrogallol - -     PLASTICS (Acrylates/Epoxy Systems)       # Substance 2 days 4 days remarks     Acrylates - -    133 1 2-Hydroxyethyl Methacrylate (HEMA) - -     2 1,4-Butandioldimethacrylate (BUDMA) - -    135 3  2-Ethylhexyl Acrylate - -     4 Bisphenol-A-Dimethacrylate  - -     5  "Diurethane-Dimethacrylate - -     6 Ethyleneglycoldimethacrylate (EGDMA) - -     7 Pentaerythritoltriacrylate (KEITH) - -    140 8 Triethylene Glycol Dimethacrylate (TEGDMA) - -      Synthetic material/additives        9 I-Qukw-Igyzgmwganj - -     10 Tricresyl Phosphate - -     11 7-Nfyj-Migrkyaxclkwv - -     12 Dioctyl phtalate(DEHP,DOP) / (Dimethylhexylphthalate)  - -    145 13 Dibutylphthalate - -     14 Dimethylphthalate - -     15 Toluene-2,4-Diisocyanate NA NA     16 Diphenylmethane-4,4''-Diisocyanate NA NA      EPOXY RESIN SYSTEMS        Reactive Solvents - -     17 Cresyl Glycidyl Ether NA NA    150 18 Butyl Glycidyl Ether - -     19 Phenyl Glycidyl Ether - -     20 1,4-Butanediol Diglycidyl Ether - -     21 1,6-Hexanediole Diglycidyl Ether - -      Hardener / Accelerator - -     22 Triethylenetetramine - -    155 23 Diethylenetriamine - -     24 Isophorone Diamine (IPD) - -     25 N,N-Dimethyl-P-Toluidine - -    158 26 Isobornyl Acrylate - -     ANTIBIOTICS & ANTIMYCOTICS    # Substance 2 days 4 days remarks   159 1 Erythromycin - -    160 2 Framycetin Sulfate - -     3 Fusidic Acid Sodium Salt - -     4 Gentamicin Sulfate - -     5 Neomycin Sulfate - -     6 Oxytetracycline  - -    165 7 Polymyxin B Sulfate - -     8 Tetracycline-HCL - -     9 Sulfanilamide - -     10 Metronidazole - -     11 Nitrofurazone - -    170 12 Nystatin - -     13 Clotrimazole - -     14 Clioquinol 5% - -     15 Miconazole  - -    174 16 Tobramycine        OTHER PRODUCTS        # Substance Conc  % solv 2 days 4 days remarks   175 1 [place] spray tan        176 2 Lesia dry shampoo          Patients own products:  è DO NOT test if chemical or biological identity is unknown!   - always ask from patient the product information and safety sheets and consult with the physician   - check neutral pH with pH indicator paper (do not test pH below 5 or over 8 or consult with physician)  - leave-on cosmetics can be tested \"as is\"  - " rinse-off products have to be diluted with water, buffer, olive oil or paraffin (discuss with physician)  à remember:   - non-specific toxic/corrosive or biological reactions can occur  - tests with patients own products are not standardized and test conditions are not optimized for patch tests. Whenever possible test with standardized test series and correlate use of product with the result of the patch tests  - if not sure if compounds can be tested under occlusion in patch tests consider open application tests    Results of patch tests:                         Interpretation:  - Negative                    A    = Allergic      (+) Erythema    TI   = Toxic/irritant   + E + Infiltration    RaP = Relevance at Present     ++ E/I + Papulovesicle   Rpr  = Relevance Previously     +++ E/I/P + Blister     nR   = No Relevance      DRUG ALLERGY TEST SERIES Aug 14, 2023    ANTIBIOTICS    Prick Tests         Substance/ Allergen Conc Result (20 min) Remarks   1 Cefazolin[1] 100 mg/ml -    2 Ceftriaxone* [2] 100 mg/ml -    3 Bactrim 80/400 mg/ml -    4 Nitrofurantoin powder 100 mg -       Intradermal Tests   immed immed delay delay      Substance Conc 1st dil  2nd dil  2 days  4 days remarks   1 Cefazolin[1] 1:5 -       2 Ceftriaxone* [2] 1:5 -       3 Bactrim 1:100 -           Patch Tests  as is as is 1:2 1:2     As Is  Vas Substance Conc 2 days 4 days 2 days 4 days remarks   1 2 Cefazolin[1] 100 mg/ml        3 4 Ceftriaxone* [2] 100 mg/ml        5 6 Bactrim 80/400 mg/ml        7 8 Nitrofurantoin powder 100 mg        [1]  Cefalexin/Cefazolin-group        [2]    Cefotaxim-group     [3]     Cefuroxim-group      Atopy Screen (Placed Aug 14, 2023)  No Substance Readings (15 min) Evaluation   POS Histamine 1mg/ml ++    NEG NaCl 0.9% - dermographism     No Substance Readings (15 min) Evaluation   1 Alternaria alternata (tenuis)  -    2 Cladosporium herbarum -    3 Aspergillus fumigatus -    4 Penicillium notatum -    5  Dermatophagoides pteronyssinus -    6 Dermatophagoides farinae -    7 Dog epithelium (canis spp) -    8 Cat hair (jony catus) -    9 Cockroach   (Blatella americana & germanica) -    10 Grass mix midwest   (Parris, Orchard, Redtop, Waqas) -    11 Gerardo grass (sorghum halepense) -    12 Weed mix   (common Cocklebur, Lamb s quarters, rough redroot Pigweed, Dock/Sorrel) -    13 Mug wort (artemisia vulgare) -    14 Ragweed giant/short (ambrosia spp) -    15 White birch (Betula papyrifera) -    16 Tree mix 1 (Pecan, Maple BHR, Oak RVW, american Alston, black Jerome) -    17 Red cedar (juniperus virginia) -    18 Tree mix 2   (white Alvin, river/red Birch, black Milton, common Huron, american Elm) -    19 Box elder/Maple mix (acer spp) -    20 Turpin shagbark (carya ovata) -           Conclusion: no clear signs for positive reaction in atopy screen prick tests      Intradermal Testing (Placed Aug 14, 2023)  No Substance Conc.  Reading (15min)  immediate Papule [mm] / Erythema [mm] Reading   (... days)  delayed Papule [mm] / Erythema [mm] Remarks   DF Standard Dust Mite - D. Farinae 1:10 -   -    DP Standard Dust Mite - D. Pteronyssinus 1:10 -   -    A Aspergillus fumigatus  1:10 -   -    P Penicillium notatum 1:10 -   -    Conclusions: no immediate type reaction. Will see if any delayed reaction    [] No relevant allergic reaction observed    [] Allergic reaction diagnosed against following allergens:      Interpretation/ remarks:   See later    [] Patient information given   [] ACDS CAMP information's (# ....) to following compounds: .....   [] General information's to following compounds: ......      Assessment & Plan:    ==> Final Diagnosis:     # Atopic predisposition? With  Seasonal RC and Rhinosinusitis in spring  Perennial RC and Sinusitis (more at night)  Family hx of Asthma  St after IT at home (what injected?)  * chronic illness with exacerbation, progression, side effects from treatment    # physical  Urticaria triggered by heat and stress  Some improvement on Xolair  * chronic illness with exacerbation, progression, side effects from treatment    # recurrent pruritic, papular dermatitis on trunk and extremities  --> one biopsy eczematous  --> another biopsy ANDREINA. Adriannorma?  DDx allergic contact dermatitis  DDx atopic dermatitis  DDx M. Dinesh Darier type  * chronic illness with exacerbation, progression, side effects from treatment     # hairloss with scalp irritation = allergic contact dermatitis?  * chronic illness with exacerbation, progression, side effects from treatment    # multiple drug allergies  Nitrofurantoin = hives and then turning into black erosions? No mucosal involvement  Sulfonamides = hives  Cephalexin = hives  * chronic illness with exacerbation, progression, side effects from treatment    These conclusions are made at the best of one's knowledge and belief based on the provided evidence such as patient's history and allergy test results and they can change over time or can be incomplete because of missing information's.    ==> Treatment Plan:  See later     Procedures Performed: Allergy tests, including prick, intradermal and patch tests and drug allergy tests    Staff: : provider    Follow-up in Derm-Allergy clinic for 1st readings of patch tests after 2 days and 2nd readings and final conclusions after 4 days   ___________________________    I spent a total of 45 minutes with Va Riddle during today s  visit. This time was spent discussing all the individual test results, correlating them to the clinical relevance, counseling the patient and/or coordinating care and performing allergy tests

## 2023-08-14 ENCOUNTER — OFFICE VISIT (OUTPATIENT)
Dept: ALLERGY | Facility: CLINIC | Age: 43
End: 2023-08-14
Payer: COMMERCIAL

## 2023-08-14 DIAGNOSIS — J30.89 SEASONAL AND PERENNIAL ALLERGIC RHINOCONJUNCTIVITIS: ICD-10-CM

## 2023-08-14 DIAGNOSIS — J32.9 CHRONIC RHINOSINUSITIS: ICD-10-CM

## 2023-08-14 DIAGNOSIS — L23.89 ALLERGIC CONTACT DERMATITIS DUE TO OTHER AGENTS: ICD-10-CM

## 2023-08-14 DIAGNOSIS — H10.10 SEASONAL AND PERENNIAL ALLERGIC RHINOCONJUNCTIVITIS: ICD-10-CM

## 2023-08-14 DIAGNOSIS — L11.1 GROVER'S DISEASE: ICD-10-CM

## 2023-08-14 DIAGNOSIS — Z88.9 ATOPY: ICD-10-CM

## 2023-08-14 DIAGNOSIS — L20.89 OTHER ATOPIC DERMATITIS: ICD-10-CM

## 2023-08-14 DIAGNOSIS — J30.2 SEASONAL AND PERENNIAL ALLERGIC RHINOCONJUNCTIVITIS: ICD-10-CM

## 2023-08-14 DIAGNOSIS — Z88.9 MULTIPLE DRUG ALLERGIES: Primary | ICD-10-CM

## 2023-08-14 PROCEDURE — 95004 PERQ TESTS W/ALRGNC XTRCS: CPT | Performed by: DERMATOLOGY

## 2023-08-14 PROCEDURE — 95044 PATCH/APPLICATION TESTS: CPT | Performed by: DERMATOLOGY

## 2023-08-14 PROCEDURE — 95024 IQ TESTS W/ALLERGENIC XTRCS: CPT | Performed by: DERMATOLOGY

## 2023-08-14 PROCEDURE — 95018 ALL TSTG PERQ&IQ DRUGS/BIOL: CPT | Performed by: DERMATOLOGY

## 2023-08-14 RX ADMIN — NITROFURANTOIN 100 MG: 25; 75 CAPSULE ORAL at 16:00

## 2023-08-14 NOTE — LETTER
8/14/2023         RE: Va Riddle  [address]  [facility] [address]        Dear Colleague,    Thank you for referring your patient, Va Riddle, to the [facility] [place]. Please see a copy of my visit note below.    [facility] Dermato-allergology Note  Office visit  Encounter Date: Aug 14, 2023  ____________________________________________    CC: No chief complaint on file.      HPI:  (Aug 14, 2023)  Ms. Va Riddle is a(n) 43 year old female who presents today as a return patient for allergy tests as planned  - Follow-up in Derm-Allergy clinic for allergy tests as planned (has to be off systemic antihistamines for about 1 week --> topical or inhaled steroids OK as long not on the back)   - otherwise feeling well in usual state of health    Physical exam:  General: In no acute distress, well-developed, well-nourished  Eyes: no conjunctivitis  ENT: no signs of rhinitis   Pulmonary: no wheezing or coughing  Skin: Focused examination of the skin on test sites was performed = see test results below  No active eczematous skin lesions on tests sites, particularly back  On the entire back colorful tattoo, have to test over it    Earlier History and Allergy exams:  (May 15, 2023)  2 different kinds of rash.  First rash, maybe started ~7 years ago. Describes as scaly brown/pink dots on chest and backs. 5 years ago, became particularly bothersome, would have to wear different shirts and found herself having to cover rash up.. The lesions became angry and spread, recently has spread up/down to legs and all over back. Went to local dermatologist, thought it was dermatitis. Has been treated with cortisone, betamethasone, tretinoin, triamcinolone, different eczema lotions. Has tried Singulair, claritin, benadryl (currently itches with benadryl now). Started Xolair which initially seemed to suppress ~70% of the rash, however has become  less efficacious over time and rash has not resolved. Dad and nieces/nephews have asthma. 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 Has had allergy testing in the past several years ago and received allergy shots.    Also has another rash, describes as big, red/purple, splotchy swelling typically localized in upper body up to neck. Feels prickly/staticky prior to onset. This rash tends to be triggered by heat, eating anything warm or in sun, emotional stimulus, high BP. It comes and goes, can happen up to several times a day. Cold water helps with rash. Does not improve with allergy medicines. Gets tickle in back of throat, won't go away.    Dad, not 100% sure is dad, pretty significant skin stuff. Liver spots. Warts, patches.    Laundry detergent: Gain, has used all different kinds; no difference, no itching when put clothes on. Bleach slightly reactive. Soaps: bodywash dove sensitive, Aveeno oatmeal kind, whatever body washes. Didn't find too much. Does use spray tan pretty frequently; doesn't see much of a difference. Bumps do become more prominent after spray tan.      Past Medical History:   There is no problem list on file for this patient.    No past medical history on file.    Allergies:  Allergies   Allergen Reactions     Metoclopramide Hives     Also felt edgy and claustrophobic along with feeling itchy everywhere.  Doesn't ever want it again IV or oral     Sulfa Antibiotics Hives and Unknown     Cephalexin Other (See Comments)     Nitrofurantoin        Medications:  Current Outpatient Medications   Medication     amphetamine-dextroamphetamine (ADDERALL) 20 MG tablet     atenolol (TENORMIN) 25 MG tablet     betamethasone dipropionate (DIPROSONE) 0.05 % external cream     clobetasol (TEMOVATE) 0.05 % external solution     ketoconazole (NIZORAL) 2 % external shampoo     linaclotide (LINZESS) 290 MCG capsule     MINOXIDIL FOR WOMEN EX     sucralfate (CARAFATE) 1 GM  tablet     UBRELVY 100 MG tablet     XOLAIR 150 MG/ML SOSY injection     Current Facility-Administered Medications   Medication     nitroFURantoin macrocrystal-monohydrate (MACROBID) capsule 100 mg       Social History:  The patient  for [employer], [employer] and [employer]. Patient has the following hobbies or non-occupational exposure work out, dance, play with kids, ride motorcycles    Family History:  No family history on file.    Previous Labs, Allergy Tests, Dermatopathology, Imaging:    Previous TRUE Test: 3/1/23:   1+ reactions:   - Nickel sulfate  Paraben Mix  Thimersal   Allergens showing irritant reactions  2-bromo-2-nitropropene-1,3-diol    Referred By: No referring provider defined for this encounter.     Allergy Tests:    Past Allergy Test    Order for Future Allergy Testing:    [x] Outpatient  [] Inpatient: [facility]..../ Bed ....       Skin Atopy (atopic dermatitis) [x] Yes   [] No local derm says she has eczema  Contact allergies:   [x] Yes   [] No ..........   Hand eczema:   [x] Yes   [] No goldbond eczema helps, look crepey           Leading hand:   [] R   [] L       [] Ambidextrous         Drug allergies:        [x] Yes   [] No  Which? Extreme hives turned into SJS nitrofurantoin  Urticaria/Angioedema  [x] Yes   [] No .........  Food Allergy:  [] Yes   [x] No  which?......  Pets :  [x] Yes   [] No  Which?1 dog         [x]  Rhinitis   [x] Conjunctivitis   [x] Sinusitis   [] Polyposis   [x] Otitis (fluid/discharge behind ears)   [] Pharyngitis         [x]  Postnasal drip    []  none  Operations:   [] Tonsils   [] Septum   [] Sinus   [] Polyposis        [] Asthma bronchiale   [x] Coughing      []  none  Symptoms (mostly Rhinoconjunctivitis and Asthma) aggravated by:  Season   [] I   [] II   [] III   [x] IV   [x]V   [x]VI   []VII   []VIII   []IX   []X   []XI   []XII     [x] perennial   Day time      [] morning   [] noon      [] evening        [x] night    [x] whole  day........  []  none  Location/changes    [] inside        [] outside   [] mountains    [] sea     [] others.............   [x]  none  Triggers, specific     [] animals     [] plants     [] dust              [] others ...........................    [x]  none  Triggers, others       [] work          [] psyche    [] sport            [] others .............................  [x]  none  Irritant                [] phys efforts [] smoke    [] heat/cold     [] odors  [x]others. Air turn on . []  none    Order for PATCH TESTS  Reason for tests (suspected allergy): eczematous rash on trunk and extremities and scalp = ACD?  Known previous allergies: had true test in the past borderline pos to Nickel, Parabens. Thimerosal  Standardized panels  [x] Standard panel (40 tests)  [x] Preservatives & Antimicrobials (31 tests)  [x] Emulsifiers & Additives (25 tests)   [x] Perfumes/Flavours & Plants (25 tests)  [x] Hairdresser panel (12 tests)  [] Rubber Chemicals (22 tests)  [x] Plastics (26 tests)  [] Colorants/Dyes/Food additives (20 tests)  [] Metals (implants/dental) (24 tests)  [] Local anaesthetics/NSAIDs (13 tests)  [x] Antibiotics & Antimycotics (14 tests)   [] Corticosteroids (15 tests)   [] Photopatch test (62 tests)   [] others: ...      [x] Patient's own products: .spray tan  DO NOT test if chemical or biological identity is unknown!   always ask from patient the product information and safety sheets (MSDS)       Order for PRICK TESTS    Reason for tests (suspected allergy): atopy?  Known previous allergies: had IT at home and now tests negatives?    Standardized prick panels  [x] Atopic panel (20 tests)  [] Pediatric Panel (12 tests)  [] Milk, Meat, Eggs, Grains (20 tests)   [] Dust, Epithelia, Feathers (10 tests)  [] Fish, Seafood, Shellfish (17 tests)  [] Nuts, Beans (14 tests)  [] Spice, Vegetable, Fruit (17 tests)  [] Pollen Panel = Tree, Grass, Weed (24 tests)  [] Others: ...      [] Patient's own products: ...  DO  NOT test if chemical or biological identity is unknown!   always ask from patient the product information and safety sheets (MSDS)     Standardized intradermal tests  [x] Penicillium notatum [x] Aspergillus fumigatus [x] House dust mites D.far & D. pteron  [] Cat    [] dog  [] Others: ...  [] Bee venom   [] Wasp venom  !!Specific protocol with dilutions!!       Order for Drug allergy tests (prick & Intradermal & patch tests)    [] Penicillin G  [] Ampicillin [x] Cefazolin   [x] Ceftriaxone   [] Ceftazidime  [x] Bactrim    [x] Others: Nitrofurantoin  Order for ... as test date    ________________________________  RESULTS & EVALUATION of PATCH TESTS    Aug 14, 2023 application of patch tests:    Patch test readings after     [x] 2 days, [] 3 days [x] 4 days, [] 5 days,  Other duration: ...    STANDARD Series                                          # Substance 2 days 4 days remarks     1 Francisco Mix [C] - -       2 Colophony - -       3  2-Mercaptobenzothiazole  - -       4 Methylisothiazolinone - -       5 Carba Mix - -       6 Thiuram Mix [A] - -       7 Bisphenol A Epoxy Resin - -       8 C-Kemm-Shbyotjdzbc-Formaldehyde Resin - -       9 Mercapto Mix [A] - -       10 Black Rubber Mix- PPD [B] - -       11 Potassium Dichromate  -  -       12 Balsam of [place] (Myroxylon Pereirae Resin) - -       13 Nickel Sulphate Hexahydrate - -       14 Mixed Dialkyl Thiourea - -       15 Paraben Mix [B] - -       16 Methyldibromo Glutaronitrile - -       17 Fragrance Mix 8% - -       18 2-Bromo-2-Nitropropane-1,3-Diol (Bronopol) CT - -       19 Lyral - -       20 Tixocortol-21- Pivalate CT - -       21 Diazolidinyl urea (Germall II) - -        22 Methyl Methacrylate - -       23 Cobalt (II) Chloride Hexahydrate - -       24 Fragrance Mix II  - -       25 Compositae Mix - -       26 Benzoyl Peroxide - -       27 Bacitracin - -       28 Formaldehyde - -       29 Methylchloroisothiazolinone / Methylisothiazolinone - -       30  Corticosteroid Mix CT - -       31 Sodium Lauryl Sulfate - -       32 Lanolin Alcohol - -       33 Turpentine - -       34 Cetylstearylalcohol - -       35 Chlorhexidine Dicluconate - -       36 Budesonide - -       37 Imidazolidinyl Urea  - -       38 Ethyl-2 Cyanoacrylate - -       39 Quaternium 15 (Dowicil 200) - -       40 Decyl Glucoside - -      PRESERVATIVES & ANTIMICROBIALS        # Substance 2 days 4 days remarks   41 1  1,2-Benzisothiazoline-3-One, Sodium Salt - -     2  1,3,5-Martinez (2-Hydroxyethyl) - Hexahydrotriazine (Grotan BK) - -     3 5-Aupwtvpxvwaun-4-Nitro-1, 3-Propanediol NA NA     4  3, 4, 4' - Triclocarban - -    45 5 4 - Chloro - 3 - Cresol - -     6 4 - Chloro - 4 - Xylenol (PCMX) - -     7 7-Ethylbicyclooxazolidine (Bioban AT2139) - -     8 Benzalkonium Chloride CT - -     9 Benzyl Alcohol - -    50 10 Cetalkonium Chloride - -     11 Cetylpyrimidine Chloride  - -     12 Chloroacetamide - -     13 DMDM Hydantoin - -     14 Glutaraldehyde - -    55 15 Triclosan - -     16 Glyoxal Trimeric Dihydrate - -     17 Iodopropynyl Butylcarbamate - -     18 Octylisothiazoline - -     19 Bithionol CT - -    60 20 Bioban P 1487 (Nitrobutyl) Morpholine/(Ethylnitro-Trimethylene) Dimorpholine - -     21 Phenoxyethanol - -     22 Phenyl Salicylate - -     23 Povidone Iodine - -     24 Sodium Benzoate - -    65 25 Sodium Disulfite - -     26 Sorbic Acid - -     27 Thimerosal - -     28 Melamine Formaldehyde Resin - -     29 Ethylenediamine Dihydrochloride - -      Parabens      70 30 Butyl-P-Hydroxybenzoate - -     31 Ethyl-P-Hydroxybenzoate - -     32 Methyl-P-Hydroxybenzoate - -    73 33 Propyl-P-Hydroxybenzoate - -     EMULSIFIERS & ADDITIVES       # Substance 2 days 4 days remarks   74 1 Polyethylene Glycol-400 - -    75 2 Cocamidopropyl Betaine - -     3 Amerchol L101 - -     4 Propylene Glycol - -     5 Triethanolamine - -     6 Sorbitane Sesquiolate CT - -    80 7 Isopropylmyristate - -     8 Polysorbate  80 CT - -     9 Amidoamine   (Stearamidopropyl Dimethylamine) - -     10 Oleamidopropyl Dimethylamine - -     11 Lauryl Glucoside - -    85 12 Coconut Diethanolamide  - -     13 2-Hydroxy-4-Methoxy Benzophenone (Oxybenzone) - -     14 Benzophenone-4 (Sulisobenzon) - -     15 Propolis - -     16 Dexpanthenol - -    90 17 Abitol - -     18 Tert-Butylhydroquinone - -     19 Benzyl Salicylate - -     20 Dimethylaminopropylamin (DMPA) - -     21 Zinc Pyrithione (Zinc Omadine)  - -    95 22 Martinez(Hydroxymethyl) Nitromethane  - -      Antioxidant       23 Dodecyl Gallate - -     24 Butylhydroxyanisole (BHA) - -     25 Butylhydroxytoluene (BHT) - -     26 Di-Alpha-Tocopherol (Vit E) - -    100 27 Propyl Gallate - -     PERFUMES, FLAVORS & PLANTS        # Substance 2 days 4 days remarks   101 1 Benzyl Cinnamate - -     2 Di-Limonene (Dipentene) - -     3 Cananga Odorata (Aldo Carlson) (I) - -     4 Lichen Acid Mix - -    105 5 Mentha Piperita Oil (Peppermint Oil) - -     6 Sesquiterpenelactone mix - -     7 Tea Tree Oil, Oxidized - -     8 Wood Tar Mix - -     9 Abietic Acid - -    110 10 Lavendula Angustifolia Oil (Lavender Oil) - -     11 Fragrance mix II CT * 14% - -      Fragrance Mix I       12 Oakmoss Absolute - -     13 Eugenol - -     14 Geraniol - -    115 15 Hydroxycitronellal - -     16 Isoeugenol - -     17 Cinnamic Aldehyde - -     18 Cinnamic Alcohol  - -      Fragrance mix II       19 Citronellol - -    120 20 Alpha-Hexylcinnamic Aldehyde    - -     21 Citral - -     22 Farnesol - -    123 23 Coumarin - -    Hexylcinnamic aldehyde, Coumarin, Farnesol, Hydroxyisohexy3-cyclohexene carboxaldehyde, citral, citrolellol  HAIRDRESSER        # Substance 2 days 4 days remarks   124 1 P-Phenylenediamine -  -    125 2 P-Toluenediamine Sulphate  -  -     3 Ammonium Thioglycolate - -     4 Ammonium Persulfate - -     5 Resorcinol - -     6 3-Aminophenol - -    130 7 P-Aminophenol - -     8 Glyceryl Monothioglycolate - -     132 9 Pyrogallol - -     PLASTICS (Acrylates/Epoxy Systems)       # Substance 2 days 4 days remarks     Acrylates - -    133 1 2-Hydroxyethyl Methacrylate (HEMA) - -     2 1,4-Butandioldimethacrylate (BUDMA) - -    135 3  2-Ethylhexyl Acrylate - -     4 Bisphenol-A-Dimethacrylate  - -     5 Diurethane-Dimethacrylate - -     6 Ethyleneglycoldimethacrylate (EGDMA) - -     7 Pentaerythritoltriacrylate (KEITH) - -    140 8 Triethylene Glycol Dimethacrylate (TEGDMA) - -      Synthetic material/additives        9 C-Kaom-Mzahbgpkyqh - -     10 Tricresyl Phosphate - -     11 2-Zigt-Diutwdpvfotlf - -     12 Dioctyl phtalate(DEHP,DOP) / (Dimethylhexylphthalate)  - -    145 13 Dibutylphthalate - -     14 Dimethylphthalate - -     15 Toluene-2,4-Diisocyanate NA NA     16 Diphenylmethane-4,4''-Diisocyanate NA NA      EPOXY RESIN SYSTEMS        Reactive Solvents - -     17 Cresyl Glycidyl Ether NA NA    150 18 Butyl Glycidyl Ether - -     19 Phenyl Glycidyl Ether - -     20 1,4-Butanediol Diglycidyl Ether - -     21 1,6-Hexanediole Diglycidyl Ether - -      Hardener / Accelerator - -     22 Triethylenetetramine - -    155 23 Diethylenetriamine - -     24 Isophorone Diamine (IPD) - -     25 N,N-Dimethyl-P-Toluidine - -    158 26 Isobornyl Acrylate - -     ANTIBIOTICS & ANTIMYCOTICS    # Substance 2 days 4 days remarks   159 1 Erythromycin - -    160 2 Framycetin Sulfate - -     3 Fusidic Acid Sodium Salt - -     4 Gentamicin Sulfate - -     5 Neomycin Sulfate - -     6 Oxytetracycline  - -    165 7 Polymyxin B Sulfate - -     8 Tetracycline-HCL - -     9 Sulfanilamide - -     10 Metronidazole - -     11 Nitrofurazone - -    170 12 Nystatin - -     13 Clotrimazole - -     14 Clioquinol 5% - -     15 Miconazole  - -    174 16 Tobramycine        OTHER PRODUCTS        # Substance Conc  % solv 2 days 4 days remarks   175 1 [place] spray tan        176 2 Lesia dry shampoo          Patients own products:  è DO NOT test if chemical  "or biological identity is unknown!   - always ask from patient the product information and safety sheets and consult with the physician   - check neutral pH with pH indicator paper (do not test pH below 5 or over 8 or consult with physician)  - leave-on cosmetics can be tested \"as is\"  - rinse-off products have to be diluted with water, buffer, olive oil or paraffin (discuss with physician)  à remember:   - non-specific toxic/corrosive or biological reactions can occur  - tests with patients own products are not standardized and test conditions are not optimized for patch tests. Whenever possible test with standardized test series and correlate use of product with the result of the patch tests  - if not sure if compounds can be tested under occlusion in patch tests consider open application tests    Results of patch tests:                         Interpretation:  - Negative                    A    = Allergic      (+) Erythema    TI   = Toxic/irritant   + E + Infiltration    RaP = Relevance at Present     ++ E/I + Papulovesicle   Rpr  = Relevance Previously     +++ E/I/P + Blister     nR   = No Relevance      DRUG ALLERGY TEST SERIES Aug 14, 2023    ANTIBIOTICS    Prick Tests         Substance/ Allergen Conc Result (20 min) Remarks   1 Cefazolin[1] 100 mg/ml -    2 Ceftriaxone* [2] 100 mg/ml -    3 Bactrim 80/400 mg/ml -    4 Nitrofurantoin powder 100 mg -       Intradermal Tests   immed immed delay delay      Substance Conc 1st dil  2nd dil  2 days  4 days remarks   1 Cefazolin[1] 1:5 -       2 Ceftriaxone* [2] 1:5 -       3 Bactrim 1:100 -           Patch Tests  as is as is 1:2 1:2     As Is  Vas Substance Conc 2 days 4 days 2 days 4 days remarks   1 2 Cefazolin[1] 100 mg/ml        3 4 Ceftriaxone* [2] 100 mg/ml        5 6 Bactrim 80/400 mg/ml        7 8 Nitrofurantoin powder 100 mg        [1]  Cefalexin/Cefazolin-group        [2]    Cefotaxim-group     [3]     Cefuroxim-group      Atopy Screen (Placed Aug 14, " 2023)  No Substance Readings (15 min) Evaluation   POS Histamine 1mg/ml ++    NEG NaCl 0.9% - dermographism     No Substance Readings (15 min) Evaluation   1 Alternaria alternata (tenuis)  -    2 Cladosporium herbarum -    3 Aspergillus fumigatus -    4 Penicillium notatum -    5 Dermatophagoides pteronyssinus -    6 Dermatophagoides farinae -    7 Dog epithelium (canis spp) -    8 Cat hair (jony catus) -    9 Cockroach   (Blatella americana & germanica) -    10 Grass mix midwest   (Parris, Orchard, Redtop, Waqas) -    11 Gerardo grass (sorghum halepense) -    12 Weed mix   (common Cocklebur, Lamb s quarters, rough redroot Pigweed, Dock/Sorrel) -    13 Mug wort (artemisia vulgare) -    14 Ragweed giant/short (ambrosia spp) -    15 White birch (Betula papyrifera) -    16 Tree mix 1 (Pecan, Maple BHR, Oak RVW, american [place]illwyn, black Flippin) -    17 Red cedar (juniperus virginia) -    18 Tree mix 2   (white Alvin, river/red Birch, black [place], common [place], american Elm) -    19 Box elder/Maple mix (acer spp) -    20 Cherry shagbark (carya ovata) -           Conclusion: no clear signs for positive reaction in atopy screen prick tests      Intradermal Testing (Placed Aug 14, 2023)  No Substance Conc.  Reading (15min)  immediate Papule [mm] / Erythema [mm] Reading   (... days)  delayed Papule [mm] / Erythema [mm] Remarks   DF Standard Dust Mite - D. Farinae 1:10 -   -    DP Standard Dust Mite - D. Pteronyssinus 1:10 -   -    A Aspergillus fumigatus  1:10 -   -    P Penicillium notatum 1:10 -   -    Conclusions: no immediate type reaction. Will see if any delayed reaction    [] No relevant allergic reaction observed    [] Allergic reaction diagnosed against following allergens:      Interpretation/ remarks:   See later    [] Patient information given   [] ACDS CAMP information's (# ....) to following compounds: .....   [] General information's to following compounds: ......      Assessment & Plan:    ==> Final  Diagnosis:     # Atopic predisposition? With  Seasonal RC and Rhinosinusitis in spring  Perennial RC and Sinusitis (more at night)  Family hx of Asthma  St after IT at home (what injected?)  * chronic illness with exacerbation, progression, side effects from treatment    # physical Urticaria triggered by heat and stress  Some improvement on Xolair  * chronic illness with exacerbation, progression, side effects from treatment    # recurrent pruritic, papular dermatitis on trunk and extremities  --> one biopsy eczematous  --> another biopsy ANDREINA. Edwar?  DDx allergic contact dermatitis  DDx atopic dermatitis  DDx M. Dinesh Darier type  * chronic illness with exacerbation, progression, side effects from treatment     # hairloss with scalp irritation = allergic contact dermatitis?  * chronic illness with exacerbation, progression, side effects from treatment    # multiple drug allergies  Nitrofurantoin = hives and then turning into black erosions? No mucosal involvement  Sulfonamides = hives  Cephalexin = hives  * chronic illness with exacerbation, progression, side effects from treatment    These conclusions are made at the best of one's knowledge and belief based on the provided evidence such as patient's history and allergy test results and they can change over time or can be incomplete because of missing information's.    ==> Treatment Plan:  See later     Procedures Performed: Allergy tests, including prick, intradermal and patch tests and drug allergy tests    Staff: : provider    Follow-up in Derm-Allergy clinic for 1st readings of patch tests after 2 days and 2nd readings and final conclusions after 4 days   ___________________________    I spent a total of 45 minutes with Va Riddle during today s  visit. This time was spent discussing all the individual test results, correlating them to the clinical relevance, counseling the patient and/or coordinating care and performing allergy  tests          Again, thank you for allowing me to participate in the care of your patient.        Sincerely,        Jose Amezcua MD

## 2023-08-14 NOTE — PATIENT INSTRUCTIONS
Removal of Patch Tests on Day 3:    Remove patches and tape from one test area (one rectangle)          Using the purple surgical markers provided (or other permanent marker), draw a grid around the test area so that the circular indentation is in the center of each square, as below. Try to be as neat as possible and keep the lines as straight as you can (you can use a ruler if you need to)          Redraw the number that was underneath the tape above the grids, as shown below. Try to print clearly.          Repeat the process for the remaining test areas.          Photograph the entire area then take close-up photos of any possible reactions. Examples of possible reactions are spots that look like these:          Return to clinic for evaluation as instructed. You should continue to avoid getting the area wet (no showering or strenuous exercise), exposing the area to UV light, using topical steroids, or scratching.         Who should I call with questions?  [facility], [place]: [phone]  For urgent needs outside of business hours call the [facility] at [phone] and ask for the dermatology resident on call      If you develop any serious or adverse allergic reaction after office hours please seek immediate medical assistance at the nearest clinic or emergency room

## 2023-08-14 NOTE — NURSING NOTE
Chief Complaint   Patient presents with    Allergy Recheck     Patch testing     Syeda BAILEY RN-BSN  Dermatology Surgery  [phone]

## 2023-08-15 NOTE — PROGRESS NOTES
[facility] Dermato-allergology Note  Virtual visit: store and forward video ([employer] connected), start time: 7:15am, end time: 7:35am, date of images: during video and in Epic media  Encounter Date: Aug 16, 2023  ____________________________________________    CC: No chief complaint on file.      HPI:  (Aug 16, 2023)  Ms. Va Riddle is a(n) 43 year old female who presents today as a return patient for allergy consultation  - Follow-up in Derm-Allergy clinic for 1st readings of patch tests after 2 days    - otherwise feeling well in usual state of health    Physical exam:  General: In no acute distress, well-developed, well-nourished  Eyes: no conjunctivitis  ENT: no signs of rhinitis   Pulmonary: no wheezing or coughing  Skin:Focused examination of the skin on test sites was performed = see test results below    Earlier History and Allergy exams:  (Aug 14, 2023)  - Follow-up in Derm-Allergy clinic for allergy tests as planned (has to be off systemic antihistamines for about 1 week --> topical or inhaled steroids OK as long not on the back)   On the entire back colorful tattoo, have to test over it    Earlier History and Allergy exams:  (May 15, 2023)  2 different kinds of rash.  First rash, maybe started ~7 years ago. Describes as scaly brown/pink dots on chest and backs. 5 years ago, became particularly bothersome, would have to wear different shirts and found herself having to cover rash up.. The lesions became angry and spread, recently has spread up/down to legs and all over back. Went to local dermatologist, thought it was dermatitis. Has been treated with cortisone, betamethasone, tretinoin, triamcinolone, different eczema lotions. Has tried Singulair, claritin, benadryl (currently itches with benadryl now). Started Xolair which initially seemed to suppress ~70% of the rash, however has become less efficacious over time and rash has not resolved. Dad and nieces/nephews have  asthma. 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 Has had allergy testing in the past several years ago and received allergy shots.    Also has another rash, describes as big, red/purple, splotchy swelling typically localized in upper body up to neck. Feels prickly/staticky prior to onset. This rash tends to be triggered by heat, eating anything warm or in sun, emotional stimulus, high BP. It comes and goes, can happen up to several times a day. Cold water helps with rash. Does not improve with allergy medicines. Gets tickle in back of throat, won't go away.    Dad, not 100% sure is dad, pretty significant skin stuff. Liver spots. Warts, patches.    Laundry detergent: Gain, has used all different kinds; no difference, no itching when put clothes on. Bleach slightly reactive. Soaps: bodywash dove sensitive, Aveeno oatmeal kind, whatever body washes. Didn't find too much. Does use spray tan pretty frequently; doesn't see much of a difference. Bumps do become more prominent after spray tan.      Past Medical History:   There is no problem list on file for this patient.    No past medical history on file.    Allergies:  Allergies   Allergen Reactions    Metoclopramide Hives     Also felt edgy and claustrophobic along with feeling itchy everywhere.  Doesn't ever want it again IV or oral    Sulfa Antibiotics Hives and Unknown    Cephalexin Other (See Comments)    Nitrofurantoin        Medications:  Current Outpatient Medications   Medication    amphetamine-dextroamphetamine (ADDERALL) 20 MG tablet    atenolol (TENORMIN) 25 MG tablet    betamethasone dipropionate (DIPROSONE) 0.05 % external cream    clobetasol (TEMOVATE) 0.05 % external solution    ketoconazole (NIZORAL) 2 % external shampoo    linaclotide (LINZESS) 290 MCG capsule    MINOXIDIL FOR WOMEN EX    sucralfate (CARAFATE) 1 GM tablet    UBRELVY 100 MG tablet    XOLAIR 150 MG/ML SOSY injection     Current  Facility-Administered Medications   Medication    nitroFURantoin macrocrystal-monohydrate (MACROBID) capsule 100 mg       Social History:  The patient  for [employer], process donations and construction. Patient has the following hobbies or non-occupational exposure work out, dance, play with kids, ride motorcycles    Family History:  No family history on file.    Previous Labs, Allergy Tests, Dermatopathology, Imaging:    Previous TRUE Test: 3/1/23:   1+ reactions:   - Nickel sulfate  Paraben Mix  Thimersal   Allergens showing irritant reactions  2-bromo-2-nitropropene-1,3-diol    Referred By: No referring provider defined for this encounter.     Allergy Tests:    Past Allergy Test    Order for Future Allergy Testing:    [x] Outpatient  [] Inpatient: [facility]..../ Bed ....       Skin Atopy (atopic dermatitis) [x] Yes   [] No local derm says she has eczema  Contact allergies:   [x] Yes   [] No ..........   Hand eczema:   [x] Yes   [] No goldbond eczema helps, look crepey           Leading hand:   [] R   [] L       [] Ambidextrous         Drug allergies:        [x] Yes   [] No  Which? Extreme hives turned into SJS nitrofurantoin  Urticaria/Angioedema  [x] Yes   [] No .........  Food Allergy:  [] Yes   [x] No  which?......  Pets :  [x] Yes   [] No  Which?1 dog         [x]  Rhinitis   [x] Conjunctivitis   [x] Sinusitis   [] Polyposis   [x] Otitis (fluid/discharge behind ears)   [] Pharyngitis         [x]  Postnasal drip    []  none  Operations:   [] Tonsils   [] Septum   [] Sinus   [] Polyposis        [] Asthma bronchiale   [x] Coughing      []  none  Symptoms (mostly Rhinoconjunctivitis and Asthma) aggravated by:  Season   [] I   [] II   [] III   [x] IV   [x]V   [x]VI   []VII   []VIII   []IX   []X   []XI   []XII     [x] perennial   Day time      [] morning   [] noon      [] evening        [x] night    [x] whole day........  []  none  Location/changes    [] inside        [] outside   [] mountains     [] sea     [] others.............   [x]  none  Triggers, specific     [] animals     [] plants     [] dust              [] others ...........................    [x]  none  Triggers, others       [] work          [] psyche    [] sport            [] others .............................  [x]  none  Irritant                [] phys efforts [] smoke    [] heat/cold     [] odors  [x]others. Air turn on . []  none    Order for PATCH TESTS  Reason for tests (suspected allergy): eczematous rash on trunk and extremities and scalp = ACD?  Known previous allergies: had true test in the past borderline pos to Nickel, Parabens. Thimerosal  Standardized panels  [x] Standard panel (40 tests)  [x] Preservatives & Antimicrobials (31 tests)  [x] Emulsifiers & Additives (25 tests)   [x] Perfumes/Flavours & Plants (25 tests)  [x] Hairdresser panel (12 tests)  [] Rubber Chemicals (22 tests)  [x] Plastics (26 tests)  [] Colorants/Dyes/Food additives (20 tests)  [] Metals (implants/dental) (24 tests)  [] Local anaesthetics/NSAIDs (13 tests)  [x] Antibiotics & Antimycotics (14 tests)   [] Corticosteroids (15 tests)   [] Photopatch test (62 tests)   [] others: ...      [x] Patient's own products: .spray tan  DO NOT test if chemical or biological identity is unknown!   always ask from patient the product information and safety sheets (MSDS)       Order for PRICK TESTS    Reason for tests (suspected allergy): atopy?  Known previous allergies: had IT at home and now tests negatives?    Standardized prick panels  [x] Atopic panel (20 tests)  [] Pediatric Panel (12 tests)  [] Milk, Meat, Eggs, Grains (20 tests)   [] Dust, Epithelia, Feathers (10 tests)  [] Fish, Seafood, Shellfish (17 tests)  [] Nuts, Beans (14 tests)  [] Spice, Vegetable, Fruit (17 tests)  [] Pollen Panel = Tree, Grass, Weed (24 tests)  [] Others: ...      [] Patient's own products: ...  DO NOT test if chemical or biological identity is unknown!   always ask from patient the  product information and safety sheets (MSDS)     Standardized intradermal tests  [x] Penicillium notatum [x] Aspergillus fumigatus [x] House dust mites D.far & D. pteron  [] Cat    [] dog  [] Others: ...  [] Bee venom   [] Wasp venom  !!Specific protocol with dilutions!!       Order for Drug allergy tests (prick & Intradermal & patch tests)    [] Penicillin G  [] Ampicillin [x] Cefazolin   [x] Ceftriaxone   [] Ceftazidime  [x] Bactrim    [x] Others: Nitrofurantoin  Order for ... as test date    ________________________________  RESULTS & EVALUATION of PATCH TESTS    Aug 14, 2023 application of patch tests:    Patch test readings after     [x] 2 days, [] 3 days [x] 4 days, [] 5 days,  Other duration: ...    STANDARD Series                                          # Substance 2 days 4 days remarks     1 Francisco Mix [C] - -       2 Colophony - -       3  2-Mercaptobenzothiazole  - -       4 Methylisothiazolinone - -       5 Carba Mix - -       6 Thiuram Mix [A] - -       7 Bisphenol A Epoxy Resin - -       8 Z-Rmcy-Njlqexnrnyd-Formaldehyde Resin - -       9 Mercapto Mix [A] - -       10 Black Rubber Mix- PPD [B] - -       11 Potassium Dichromate  -  -       12 Balsam of [place] (Myroxylon Pereirae Resin) - -       13 Nickel Sulphate Hexahydrate - -       14 Mixed Dialkyl Thiourea - -       15 Paraben Mix [B] - -       16 Methyldibromo Glutaronitrile - -       17 Fragrance Mix 8% - -       18 2-Bromo-2-Nitropropane-1,3-Diol (Bronopol) CT - -       19 Lyral - -       20 Tixocortol-21- Pivalate CT - -       21 Diazolidinyl urea (Germall II) - -        22 Methyl Methacrylate - -       23 Cobalt (II) Chloride Hexahydrate - -       24 Fragrance Mix II  - -       25 Compositae Mix - -       26 Benzoyl Peroxide - -       27 Bacitracin - -       28 Formaldehyde - -       29 Methylchloroisothiazolinone / Methylisothiazolinone - -       30 Corticosteroid Mix CT - -       31 Sodium Lauryl Sulfate - -       32 Lanolin Alcohol - -        33 Turpentine - -       34 Cetylstearylalcohol - -       35 Chlorhexidine Dicluconate - -       36 Budesonide - -       37 Imidazolidinyl Urea  - -       38 Ethyl-2 Cyanoacrylate - -       39 Quaternium 15 (Dowicil 200) - -       40 Decyl Glucoside - -      PRESERVATIVES & ANTIMICROBIALS        # Substance 2 days 4 days remarks   41 1  1,2-Benzisothiazoline-3-One, Sodium Salt - -     2  1,3,5-Martinez (2-Hydroxyethyl) - Hexahydrotriazine (Grotan BK) - -     3 6-Woripmvgaopgy-6-Nitro-1, 3-Propanediol NA NA     4  3, 4, 4' - Triclocarban - -    45 5 4 - Chloro - 3 - Cresol - -     6 4 - Chloro - 4 - Xylenol (PCMX) - -     7 7-Ethylbicyclooxazolidine (Bioban XH0869) - -     8 Benzalkonium Chloride CT - -     9 Benzyl Alcohol - -    50 10 Cetalkonium Chloride - -     11 Cetylpyrimidine Chloride  - -     12 Chloroacetamide - -     13 DMDM Hydantoin - -     14 Glutaraldehyde - -    55 15 Triclosan - -     16 Glyoxal Trimeric Dihydrate - -     17 Iodopropynyl Butylcarbamate - -     18 Octylisothiazoline - -     19 Bithionol CT - -    60 20 Bioban P 1487 (Nitrobutyl) Morpholine/(Ethylnitro-Trimethylene) Dimorpholine - -     21 Phenoxyethanol - -     22 Phenyl Salicylate - -     23 Povidone Iodine - -     24 Sodium Benzoate - -    65 25 Sodium Disulfite - -     26 Sorbic Acid - -     27 Thimerosal - -     28 Melamine Formaldehyde Resin - -     29 Ethylenediamine Dihydrochloride - -      Parabens      70 30 Butyl-P-Hydroxybenzoate - -     31 Ethyl-P-Hydroxybenzoate - -     32 Methyl-P-Hydroxybenzoate - -    73 33 Propyl-P-Hydroxybenzoate - -     EMULSIFIERS & ADDITIVES       # Substance 2 days 4 days remarks   74 1 Polyethylene Glycol-400 - -    75 2 Cocamidopropyl Betaine - -     3 Amerchol L101 - -     4 Propylene Glycol - -     5 Triethanolamine - -     6 Sorbitane Sesquiolate CT - -    80 7 Isopropylmyristate - -     8 Polysorbate 80 CT - -     9 Amidoamine   (Stearamidopropyl Dimethylamine) - -     10 Oleamidopropyl  Dimethylamine - -     11 Lauryl Glucoside - -    85 12 Coconut Diethanolamide  - -     13 2-Hydroxy-4-Methoxy Benzophenone (Oxybenzone) - -     14 Benzophenone-4 (Sulisobenzon) - -     15 Propolis - -     16 Dexpanthenol - -    90 17 Abitol - -     18 Tert-Butylhydroquinone - -     19 Benzyl Salicylate - -     20 Dimethylaminopropylamin (DMPA) - -     21 Zinc Pyrithione (Zinc Omadine)  - -    95 22 Martinez(Hydroxymethyl) Nitromethane  - -      Antioxidant       23 Dodecyl Gallate - -     24 Butylhydroxyanisole (BHA) - -     25 Butylhydroxytoluene (BHT) - -     26 Di-Alpha-Tocopherol (Vit E) - -    100 27 Propyl Gallate - -     PERFUMES, FLAVORS & PLANTS        # Substance 2 days 4 days remarks   101 1 Benzyl Cinnamate - -     2 Di-Limonene (Dipentene) - -     3 Cananga Odorata (Aldo Carlson) (I) - -     4 Lichen Acid Mix - -    105 5 Mentha Piperita Oil (Peppermint Oil) - -     6 Sesquiterpenelactone mix - -     7 Tea Tree Oil, Oxidized - -     8 Wood Tar Mix - -     9 Abietic Acid - -    110 10 Lavendula Angustifolia Oil (Lavender Oil) - -     11 Fragrance mix II CT * 14% - -      Fragrance Mix I       12 [place]moss Absolute - -     13 Eugenol - -     14 Geraniol - -    115 15 Hydroxycitronellal - -     16 Isoeugenol - -     17 Cinnamic Aldehyde - -     18 Cinnamic Alcohol  - -      Fragrance mix II       19 Citronellol - -    120 20 Alpha-Hexylcinnamic Aldehyde    - -     21 Citral - -     22 Farnesol - -    123 23 Coumarin - -    Hexylcinnamic aldehyde, Coumarin, Farnesol, Hydroxyisohexy3-cyclohexene carboxaldehyde, citral, citrolellol  HAIRDRESSER        # Substance 2 days 4 days remarks   124 1 P-Phenylenediamine -  -    125 2 P-Toluenediamine Sulphate  -  -     3 Ammonium Thioglycolate - -     4 Ammonium Persulfate - -     5 Resorcinol - -     6 3-Aminophenol - -    130 7 P-Aminophenol - -     8 Glyceryl Monothioglycolate - -    132 9 Pyrogallol - -     PLASTICS (Acrylates/Epoxy Systems)       # Substance 2 days 4  days remarks     Acrylates - -    133 1 2-Hydroxyethyl Methacrylate (HEMA) - -     2 1,4-Butandioldimethacrylate (BUDMA) - -    135 3  2-Ethylhexyl Acrylate - -     4 Bisphenol-A-Dimethacrylate  - -     5 Diurethane-Dimethacrylate - -     6 Ethyleneglycoldimethacrylate (EGDMA) - -     7 Pentaerythritoltriacrylate (KEITH) - -    140 8 Triethylene Glycol Dimethacrylate (TEGDMA) - -      Synthetic material/additives        9 Q-Hbli-Fxaafedittl - -     10 Tricresyl Phosphate - -     11 1-Qyef-Bgjocesabdzvw - -     12 Dioctyl phtalate(DEHP,DOP) / (Dimethylhexylphthalate)  - -    145 13 Dibutylphthalate - -     14 Dimethylphthalate - -     15 Toluene-2,4-Diisocyanate NA NA     16 Diphenylmethane-4,4''-Diisocyanate NA NA      EPOXY RESIN SYSTEMS        Reactive Solvents - -     17 Cresyl Glycidyl Ether NA NA    150 18 Butyl Glycidyl Ether - -     19 Phenyl Glycidyl Ether - -     20 1,4-Butanediol Diglycidyl Ether - -     21 1,6-Hexanediole Diglycidyl Ether - -      Hardener / Accelerator - -     22 Triethylenetetramine - -    155 23 Diethylenetriamine - -     24 Isophorone Diamine (IPD) - -     25 N,N-Dimethyl-P-Toluidine - -    158 26 Isobornyl Acrylate - -     ANTIBIOTICS & ANTIMYCOTICS    # Substance 2 days 4 days remarks   159 1 Erythromycin - -    160 2 Framycetin Sulfate - -     3 Fusidic Acid Sodium Salt - -     4 Gentamicin Sulfate - -     5 Neomycin Sulfate - -     6 Oxytetracycline  - -    165 7 Polymyxin B Sulfate - -     8 Tetracycline-HCL - -     9 Sulfanilamide - -     10 Metronidazole - -     11 Nitrofurazone - -    170 12 Nystatin - -     13 Clotrimazole - -     14 Clioquinol 5% - -     15 Miconazole  - -    174 16 Tobramycine        OTHER PRODUCTS        # Substance Conc  % solv 2 days 4 days remarks   175 1 [place] spray tan        176 2 Lesia dry shampoo            Results of patch tests:                         Interpretation:  - Negative                    A    = Allergic      (+) Erythema    TI    = Toxic/irritant   + E + Infiltration    RaP = Relevance at Present     ++ E/I + Papulovesicle   Rpr  = Relevance Previously     +++ E/I/P + Blister     nR   = No Relevance      DRUG ALLERGY TEST SERIES Aug 14, 2023    ANTIBIOTICS    Prick Tests         Substance/ Allergen Conc Result (20 min) Remarks   1 Cefazolin[1] 100 mg/ml -    2 Ceftriaxone* [2] 100 mg/ml -    3 Bactrim 80/400 mg/ml -    4 Nitrofurantoin powder 100 mg -       Intradermal Tests   immed immed delay delay      Substance Conc 1st dil  2nd dil  2 days  4 days remarks   1 Cefazolin[1] 1:5 -       2 Ceftriaxone* [2] 1:5 -       3 Bactrim 1:100 -           Patch Tests  as is as is 1:2 1:2     As Is  Vas Substance Conc 2 days 4 days 2 days 4 days remarks   1 2 Cefazolin[1] 100 mg/ml -  -     3 4 Ceftriaxone* [2] 100 mg/ml -       5 6 Bactrim 80/400 mg/ml -  (+)     7 8 Nitrofurantoin powder 100 mg -  -     [1]  Cefalexin/Cefazolin-group        [2]    Cefotaxim-group     [3]     Cefuroxim-group      Atopy Screen (Placed Aug 14, 2023)  No Substance Readings (15 min) Evaluation   POS Histamine 1mg/ml ++    NEG NaCl 0.9% - dermographism     No Substance Readings (15 min) Evaluation   1 Alternaria alternata (tenuis)  -    2 Cladosporium herbarum -    3 Aspergillus fumigatus -    4 Penicillium notatum -    5 Dermatophagoides pteronyssinus -    6 Dermatophagoides farinae -    7 Dog epithelium (canis spp) -    8 Cat hair (jony catus) -    9 Cockroach   (Blatella americana & germanica) -    10 Grass mix midwest   (Parris, Orchard, Redtop, Waqas) -    11 Gerardo grass (sorghum halepense) -    12 Weed mix   (common Cocklebur, Lamb s quarters, rough redroot Pigweed, Dock/Sorrel) -    13 Mug wort (artemisia vulgare) -    14 Ragweed giant/short (ambrosia spp) -    15 White birch (Betula papyrifera) -    16 Tree mix 1 (Pecan, Maple BHR, Oak RVW, american [place], black Forbes) -    17 Red cedar (juniperus virginia) -    18 Tree mix 2   (white Alvin, river/red Birch,  black [place], common [place], american Elm) -    19 Box elder/Maple mix (acer spp) -    20 Wyandot shagbark (carya ovata) -           Conclusion: no clear signs for positive reaction in atopy screen prick tests      Intradermal Testing (Placed Aug 14, 2023)  No Substance Conc.  Reading (15min)  immediate Papule [mm] / Erythema [mm] Reading   (... days)  delayed Papule [mm] / Erythema [mm] Remarks   DF Standard Dust Mite - D. Farinae 1:10 -   -    DP Standard Dust Mite - D. Pteronyssinus 1:10 -   -    A Aspergillus fumigatus  1:10 -   -    P Penicillium notatum 1:10 -   -    Conclusions: no immediate type reaction. Will see if any delayed reaction    [x] No relevant allergic reaction observed    [] Allergic reaction diagnosed against following allergens:      Interpretation/ remarks:   See later    [] Patient information given   [] ACDS CAMP information's (# ....) to following compounds: .....   [] General information's to following compounds: ......      Assessment & Plan:    ==> Final Diagnosis:     # Atopic predisposition? With  Seasonal RC and Rhinosinusitis in spring  Perennial RC and Sinusitis (more at night)  Family hx of Asthma  St after IT at home (what injected?)  * chronic illness with exacerbation, progression, side effects from treatment    # physical Urticaria triggered by heat and stress  Some improvement on Xolair  * chronic illness with exacerbation, progression, side effects from treatment    # recurrent pruritic, papular dermatitis on trunk and extremities  --> one biopsy eczematous  --> another biopsy TIARRA Vann?  DDx allergic contact dermatitis  DDx atopic dermatitis  DDx TIARRA Vann type  * chronic illness with exacerbation, progression, side effects from treatment     # hairloss with scalp irritation = allergic contact dermatitis?  * chronic illness with exacerbation, progression, side effects from treatment    # multiple drug allergies  Nitrofurantoin = hives and then turning into black  erosions? No mucosal involvement  Sulfonamides = hives  Cephalexin = hives  * chronic illness with exacerbation, progression, side effects from treatment    These conclusions are made at the best of one's knowledge and belief based on the provided evidence such as patient's history and allergy test results and they can change over time or can be incomplete because of missing information's.    ==> Treatment Plan:  See later  ___________________________    Staff: : provider    Follow-up in Derm-Allergy clinic for 2nd readings and final conclusions after 4 days   ___________________________    I spent a total of 20 minutes with Va Riddle during today s  visit. This time was spent discussing all the individual test results, correlating them to the clinical relevance, counseling the patient and/or coordinating care

## 2023-08-16 ENCOUNTER — VIRTUAL VISIT (OUTPATIENT)
Dept: ALLERGY | Facility: CLINIC | Age: 43
End: 2023-08-16
Payer: COMMERCIAL

## 2023-08-16 DIAGNOSIS — L20.89 OTHER ATOPIC DERMATITIS: ICD-10-CM

## 2023-08-16 DIAGNOSIS — Z88.9 MULTIPLE DRUG ALLERGIES: Primary | ICD-10-CM

## 2023-08-16 DIAGNOSIS — L23.89 ALLERGIC CONTACT DERMATITIS DUE TO OTHER AGENTS: ICD-10-CM

## 2023-08-16 PROCEDURE — 99207 PR NO CHARGE LOS: CPT | Mod: 95 | Performed by: DERMATOLOGY

## 2023-08-16 NOTE — LETTER
8/16/2023         RE: Va Riddle  [address]  [facility] [address]        Dear Colleague,    Thank you for referring your patient, Va Riddle, to the [facility] [place]. Please see a copy of my visit note below.    [facility] Dermato-allergology Note  Virtual visit: store and forward video (The History Presst connected), start time: 7:15am, end time: 7:35am, date of images: during video and in Epic media  Encounter Date: Aug 16, 2023  ____________________________________________    CC: No chief complaint on file.      HPI:  (Aug 16, 2023)  Ms. Va Riddle is a(n) 43 year old female who presents today as a return patient for allergy consultation  - Follow-up in Derm-Allergy clinic for 1st readings of patch tests after 2 days    - otherwise feeling well in usual state of health    Physical exam:  General: In no acute distress, well-developed, well-nourished  Eyes: no conjunctivitis  ENT: no signs of rhinitis   Pulmonary: no wheezing or coughing  Skin:Focused examination of the skin on test sites was performed = see test results below    Earlier History and Allergy exams:  (Aug 14, 2023)  - Follow-up in Derm-Allergy clinic for allergy tests as planned (has to be off systemic antihistamines for about 1 week --> topical or inhaled steroids OK as long not on the back)   On the entire back colorful tattoo, have to test over it    Earlier History and Allergy exams:  (May 15, 2023)  2 different kinds of rash.  First rash, maybe started ~7 years ago. Describes as scaly brown/pink dots on chest and backs. 5 years ago, became particularly bothersome, would have to wear different shirts and found herself having to cover rash up.. The lesions became angry and spread, recently has spread up/down to legs and all over back. Went to local dermatologist, thought it was dermatitis. Has been treated with cortisone, betamethasone, tretinoin,  triamcinolone, different eczema lotions. Has tried Singulair, claritin, benadryl (currently itches with benadryl now). Started Xolair which initially seemed to suppress ~70% of the rash, however has become less efficacious over time and rash has not resolved. Dad and nieces/nephews have asthma. 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 Has had allergy testing in the past several years ago and received allergy shots.    Also has another rash, describes as big, red/purple, splotchy swelling typically localized in upper body up to neck. Feels prickly/staticky prior to onset. This rash tends to be triggered by heat, eating anything warm or in sun, emotional stimulus, high BP. It comes and goes, can happen up to several times a day. Cold water helps with rash. Does not improve with allergy medicines. Gets tickle in back of throat, won't go away.    Dad, not 100% sure is dad, pretty significant skin stuff. Liver spots. Warts, patches.    Laundry detergent: Gain, has used all different kinds; no difference, no itching when put clothes on. Bleach slightly reactive. Soaps: bodywash dove sensitive, Aveeno oatmeal kind, whatever body washes. Didn't find too much. Does use spray tan pretty frequently; doesn't see much of a difference. Bumps do become more prominent after spray tan.      Past Medical History:   There is no problem list on file for this patient.    No past medical history on file.    Allergies:  Allergies   Allergen Reactions     Metoclopramide Hives     Also felt edgy and claustrophobic along with feeling itchy everywhere.  Doesn't ever want it again IV or oral     Sulfa Antibiotics Hives and Unknown     Cephalexin Other (See Comments)     Nitrofurantoin        Medications:  Current Outpatient Medications   Medication     amphetamine-dextroamphetamine (ADDERALL) 20 MG tablet     atenolol (TENORMIN) 25 MG tablet     betamethasone dipropionate (DIPROSONE) 0.05 % external  cream     clobetasol (TEMOVATE) 0.05 % external solution     ketoconazole (NIZORAL) 2 % external shampoo     linaclotide (LINZESS) 290 MCG capsule     MINOXIDIL FOR WOMEN EX     sucralfate (CARAFATE) 1 GM tablet     UBRELVY 100 MG tablet     XOLAIR 150 MG/ML SOSY injection     Current Facility-Administered Medications   Medication     nitroFURantoin macrocrystal-monohydrate (MACROBID) capsule 100 mg       Social History:  The patient  for [employer], [employer] and [employer]. Patient has the following hobbies or non-occupational exposure work out, dance, play with kids, ride motorcycles    Family History:  No family history on file.    Previous Labs, Allergy Tests, Dermatopathology, Imaging:    Previous TRUE Test: 3/1/23:   1+ reactions:   - Nickel sulfate  Paraben Mix  Thimersal   Allergens showing irritant reactions  2-bromo-2-nitropropene-1,3-diol    Referred By: No referring provider defined for this encounter.     Allergy Tests:    Past Allergy Test    Order for Future Allergy Testing:    [x] Outpatient  [] Inpatient: [facility]..../ Bed ....       Skin Atopy (atopic dermatitis) [x] Yes   [] No local derm says she has eczema  Contact allergies:   [x] Yes   [] No ..........   Hand eczema:   [x] Yes   [] No goldbond eczema helps, look crepey           Leading hand:   [] R   [] L       [] Ambidextrous         Drug allergies:        [x] Yes   [] No  Which? Extreme hives turned into SJS nitrofurantoin  Urticaria/Angioedema  [x] Yes   [] No .........  Food Allergy:  [] Yes   [x] No  which?......  Pets :  [x] Yes   [] No  Which?1 dog         [x]  Rhinitis   [x] Conjunctivitis   [x] Sinusitis   [] Polyposis   [x] Otitis (fluid/discharge behind ears)   [] Pharyngitis         [x]  Postnasal drip    []  none  Operations:   [] Tonsils   [] Septum   [] Sinus   [] Polyposis        [] Asthma bronchiale   [x] Coughing      []  none  Symptoms (mostly Rhinoconjunctivitis and Asthma) aggravated  by:  Season   [] I   [] II   [] III   [x] IV   [x]V   [x]VI   []VII   []VIII   []IX   []X   []XI   []XII     [x] perennial   Day time      [] morning   [] noon      [] evening        [x] night    [x] whole day........  []  none  Location/changes    [] inside        [] outside   [] mountains    [] sea     [] others.............   [x]  none  Triggers, specific     [] animals     [] plants     [] dust              [] others ...........................    [x]  none  Triggers, others       [] work          [] psyche    [] sport            [] others .............................  [x]  none  Irritant                [] phys efforts [] smoke    [] heat/cold     [] odors  [x]others. Air turn on . []  none    Order for PATCH TESTS  Reason for tests (suspected allergy): eczematous rash on trunk and extremities and scalp = ACD?  Known previous allergies: had true test in the past borderline pos to Nickel, Parabens. Thimerosal  Standardized panels  [x] Standard panel (40 tests)  [x] Preservatives & Antimicrobials (31 tests)  [x] Emulsifiers & Additives (25 tests)   [x] Perfumes/Flavours & Plants (25 tests)  [x] Hairdresser panel (12 tests)  [] Rubber Chemicals (22 tests)  [x] Plastics (26 tests)  [] Colorants/Dyes/Food additives (20 tests)  [] Metals (implants/dental) (24 tests)  [] Local anaesthetics/NSAIDs (13 tests)  [x] Antibiotics & Antimycotics (14 tests)   [] Corticosteroids (15 tests)   [] Photopatch test (62 tests)   [] others: ...      [x] Patient's own products: .spray tan  DO NOT test if chemical or biological identity is unknown!   always ask from patient the product information and safety sheets (MSDS)       Order for PRICK TESTS    Reason for tests (suspected allergy): atopy?  Known previous allergies: had IT at home and now tests negatives?    Standardized prick panels  [x] Atopic panel (20 tests)  [] Pediatric Panel (12 tests)  [] Milk, Meat, Eggs, Grains (20 tests)   [] Dust, Epithelia, Feathers (10 tests)  []  Fish, Seafood, Shellfish (17 tests)  [] Nuts, Beans (14 tests)  [] Spice, Vegetable, Fruit (17 tests)  [] Pollen Panel = Tree, Grass, Weed (24 tests)  [] Others: ...      [] Patient's own products: ...  DO NOT test if chemical or biological identity is unknown!   always ask from patient the product information and safety sheets (MSDS)     Standardized intradermal tests  [x] Penicillium notatum [x] Aspergillus fumigatus [x] House dust mites D.far & D. pteron  [] Cat    [] dog  [] Others: ...  [] Bee venom   [] Wasp venom  !!Specific protocol with dilutions!!       Order for Drug allergy tests (prick & Intradermal & patch tests)    [] Penicillin G  [] Ampicillin [x] Cefazolin   [x] Ceftriaxone   [] Ceftazidime  [x] Bactrim    [x] Others: Nitrofurantoin  Order for ... as test date    ________________________________  RESULTS & EVALUATION of PATCH TESTS    Aug 14, 2023 application of patch tests:    Patch test readings after     [x] 2 days, [] 3 days [x] 4 days, [] 5 days,  Other duration: ...    STANDARD Series                                          # Substance 2 days 4 days remarks     1 Francisco Mix [C] - -       2 Colophony - -       3  2-Mercaptobenzothiazole  - -       4 Methylisothiazolinone - -       5 Carba Mix - -       6 Thiuram Mix [A] - -       7 Bisphenol A Epoxy Resin - -       8 B-Xtmx-Anuhutncsbd-Formaldehyde Resin - -       9 Mercapto Mix [A] - -       10 Black Rubber Mix- PPD [B] - -       11 Potassium Dichromate  -  -       12 Balsam of [place] (Myroxylon Pereirae Resin) - -       13 Nickel Sulphate Hexahydrate - -       14 Mixed Dialkyl Thiourea - -       15 Paraben Mix [B] - -       16 Methyldibromo Glutaronitrile - -       17 Fragrance Mix 8% - -       18 2-Bromo-2-Nitropropane-1,3-Diol (Bronopol) CT - -       19 Lyral - -       20 Tixocortol-21- Pivalate CT - -       21 Diazolidinyl urea (Germall II) - -        22 Methyl Methacrylate - -       23 Cobalt (II) Chloride Hexahydrate - -       24  Fragrance Mix II  - -       25 Compositae Mix - -       26 Benzoyl Peroxide - -       27 Bacitracin - -       28 Formaldehyde - -       29 Methylchloroisothiazolinone / Methylisothiazolinone - -       30 Corticosteroid Mix CT - -       31 Sodium Lauryl Sulfate - -       32 Lanolin Alcohol - -       33 Turpentine - -       34 Cetylstearylalcohol - -       35 Chlorhexidine Dicluconate - -       36 Budesonide - -       37 Imidazolidinyl Urea  - -       38 Ethyl-2 Cyanoacrylate - -       39 Quaternium 15 (Dowicil 200) - -       40 Decyl Glucoside - -      PRESERVATIVES & ANTIMICROBIALS        # Substance 2 days 4 days remarks   41 1  1,2-Benzisothiazoline-3-One, Sodium Salt - -     2  1,3,5-Martinez (2-Hydroxyethyl) - Hexahydrotriazine (Grotan BK) - -     3 6-Bkvpqdxkjvicm-8-Nitro-1, 3-Propanediol NA NA     4  3, 4, 4' - Triclocarban - -    45 5 4 - Chloro - 3 - Cresol - -     6 4 - Chloro - 4 - Xylenol (PCMX) - -     7 7-Ethylbicyclooxazolidine (Bioban [id number]) - -     8 Benzalkonium Chloride CT - -     9 Benzyl Alcohol - -    50 10 Cetalkonium Chloride - -     11 Cetylpyrimidine Chloride  - -     12 Chloroacetamide - -     13 DMDM Hydantoin - -     14 Glutaraldehyde - -    55 15 Triclosan - -     16 Glyoxal Trimeric Dihydrate - -     17 Iodopropynyl Butylcarbamate - -     18 Octylisothiazoline - -     19 Bithionol CT - -    60 20 Bioban P 1487 (Nitrobutyl) Morpholine/(Ethylnitro-Trimethylene) Dimorpholine - -     21 Phenoxyethanol - -     22 Phenyl Salicylate - -     23 Povidone Iodine - -     24 Sodium Benzoate - -    65 25 Sodium Disulfite - -     26 Sorbic Acid - -     27 Thimerosal - -     28 Melamine Formaldehyde Resin - -     29 Ethylenediamine Dihydrochloride - -      Parabens      70 30 Butyl-P-Hydroxybenzoate - -     31 Ethyl-P-Hydroxybenzoate - -     32 Methyl-P-Hydroxybenzoate - -    73 33 Propyl-P-Hydroxybenzoate - -     EMULSIFIERS & ADDITIVES       # Substance 2 days 4 days remarks   74 1 Polyethylene  Glycol-400 - -    75 2 Cocamidopropyl Betaine - -     3 Amerchol L101 - -     4 Propylene Glycol - -     5 Triethanolamine - -     6 Sorbitane Sesquiolate CT - -    80 7 Isopropylmyristate - -     8 Polysorbate 80 CT - -     9 Amidoamine   (Stearamidopropyl Dimethylamine) - -     10 Oleamidopropyl Dimethylamine - -     11 Lauryl Glucoside - -    85 12 Coconut Diethanolamide  - -     13 2-Hydroxy-4-Methoxy Benzophenone (Oxybenzone) - -     14 Benzophenone-4 (Sulisobenzon) - -     15 Propolis - -     16 Dexpanthenol - -    90 17 Abitol - -     18 Tert-Butylhydroquinone - -     19 Benzyl Salicylate - -     20 Dimethylaminopropylamin (DMPA) - -     21 Zinc Pyrithione (Zinc Omadine)  - -    95 22 Martinez(Hydroxymethyl) Nitromethane  - -      Antioxidant       23 Dodecyl Gallate - -     24 Butylhydroxyanisole (BHA) - -     25 Butylhydroxytoluene (BHT) - -     26 Di-Alpha-Tocopherol (Vit E) - -    100 27 Propyl Gallate - -     PERFUMES, FLAVORS & PLANTS        # Substance 2 days 4 days remarks   101 1 Benzyl Cinnamate - -     2 Di-Limonene (Dipentene) - -     3 Cananga Odorata (Adlo Carlson) (I) - -     4 Lichen Acid Mix - -    105 5 Mentha Piperita Oil (Peppermint Oil) - -     6 Sesquiterpenelactone mix - -     7 Tea Tree Oil, Oxidized - -     8 Wood Tar Mix - -     9 Abietic Acid - -    110 10 Lavendula Angustifolia Oil (Lavender Oil) - -     11 Fragrance mix II CT * 14% - -      Fragrance Mix I       12 Oakmoss Absolute - -     13 Eugenol - -     14 Geraniol - -    115 15 Hydroxycitronellal - -     16 Isoeugenol - -     17 Cinnamic Aldehyde - -     18 Cinnamic Alcohol  - -      Fragrance mix II       19 Citronellol - -    120 20 Alpha-Hexylcinnamic Aldehyde    - -     21 Citral - -     22 Farnesol - -    123 23 Coumarin - -    Hexylcinnamic aldehyde, Coumarin, Farnesol, Hydroxyisohexy3-cyclohexene carboxaldehyde, citral, citrolellol  HAIRDRESSER        # Substance 2 days 4 days remarks   124 1 P-Phenylenediamine -  -    125  2 P-Toluenediamine Sulphate  -  -     3 Ammonium Thioglycolate - -     4 Ammonium Persulfate - -     5 Resorcinol - -     6 3-Aminophenol - -    130 7 P-Aminophenol - -     8 Glyceryl Monothioglycolate - -    132 9 Pyrogallol - -     PLASTICS (Acrylates/Epoxy Systems)       # Substance 2 days 4 days remarks     Acrylates - -    133 1 2-Hydroxyethyl Methacrylate (HEMA) - -     2 1,4-Butandioldimethacrylate (BUDMA) - -    135 3  2-Ethylhexyl Acrylate - -     4 Bisphenol-A-Dimethacrylate  - -     5 Diurethane-Dimethacrylate - -     6 Ethyleneglycoldimethacrylate (EGDMA) - -     7 Pentaerythritoltriacrylate (KEITH) - -    140 8 Triethylene Glycol Dimethacrylate (TEGDMA) - -      Synthetic material/additives        9 R-Pnfy-Uergppxidun - -     10 Tricresyl Phosphate - -     11 9-Cvoy-Jdqsytgkvhvri - -     12 Dioctyl phtalate(DEHP,DOP) / (Dimethylhexylphthalate)  - -    145 13 Dibutylphthalate - -     14 Dimethylphthalate - -     15 Toluene-2,4-Diisocyanate NA NA     16 Diphenylmethane-4,4''-Diisocyanate NA NA      EPOXY RESIN SYSTEMS        Reactive Solvents - -     17 Cresyl Glycidyl Ether NA NA    150 18 Butyl Glycidyl Ether - -     19 Phenyl Glycidyl Ether - -     20 1,4-Butanediol Diglycidyl Ether - -     21 1,6-Hexanediole Diglycidyl Ether - -      Hardener / Accelerator - -     22 Triethylenetetramine - -    155 23 Diethylenetriamine - -     24 Isophorone Diamine (IPD) - -     25 N,N-Dimethyl-P-Toluidine - -    158 26 Isobornyl Acrylate - -     ANTIBIOTICS & ANTIMYCOTICS    # Substance 2 days 4 days remarks   159 1 Erythromycin - -    160 2 Framycetin Sulfate - -     3 Fusidic Acid Sodium Salt - -     4 Gentamicin Sulfate - -     5 Neomycin Sulfate - -     6 Oxytetracycline  - -    165 7 Polymyxin B Sulfate - -     8 Tetracycline-HCL - -     9 Sulfanilamide - -     10 Metronidazole - -     11 Nitrofurazone - -    170 12 Nystatin - -     13 Clotrimazole - -     14 Clioquinol 5% - -     15 Miconazole  - -    174 16  Tobramycine        OTHER PRODUCTS        # Substance Conc  % solv 2 days 4 days remarks   175 1 [place] spray tan        176 2 Lesia dry shampoo            Results of patch tests:                         Interpretation:  - Negative                    A    = Allergic      (+) Erythema    TI   = Toxic/irritant   + E + Infiltration    RaP = Relevance at Present     ++ E/I + Papulovesicle   Rpr  = Relevance Previously     +++ E/I/P + Blister     nR   = No Relevance      DRUG ALLERGY TEST SERIES Aug 14, 2023    ANTIBIOTICS    Prick Tests         Substance/ Allergen Conc Result (20 min) Remarks   1 Cefazolin[1] 100 mg/ml -    2 Ceftriaxone* [2] 100 mg/ml -    3 Bactrim 80/400 mg/ml -    4 Nitrofurantoin powder 100 mg -       Intradermal Tests   immed immed delay delay      Substance Conc 1st dil  2nd dil  2 days  4 days remarks   1 Cefazolin[1] 1:5 -       2 Ceftriaxone* [2] 1:5 -       3 Bactrim 1:100 -           Patch Tests  as is as is 1:2 1:2     As Is  Vas Substance Conc 2 days 4 days 2 days 4 days remarks   1 2 Cefazolin[1] 100 mg/ml -  -     3 4 Ceftriaxone* [2] 100 mg/ml -       5 6 Bactrim 80/400 mg/ml -  (+)     7 8 Nitrofurantoin powder 100 mg -  -     [1]  Cefalexin/Cefazolin-group        [2]    Cefotaxim-group     [3]     Cefuroxim-group      Atopy Screen (Placed Aug 14, 2023)  No Substance Readings (15 min) Evaluation   POS Histamine 1mg/ml ++    NEG NaCl 0.9% - dermographism     No Substance Readings (15 min) Evaluation   1 Alternaria alternata (tenuis)  -    2 Cladosporium herbarum -    3 Aspergillus fumigatus -    4 Penicillium notatum -    5 Dermatophagoides pteronyssinus -    6 Dermatophagoides farinae -    7 Dog epithelium (canis spp) -    8 Cat hair (jony catus) -    9 Cockroach   (Blatella americana & germanica) -    10 Grass mix midwest   (Parris, Orchard, Redtop, Waqas) -    11 Gerardo grass (sorghum halepense) -    12 Weed mix   (common Cocklebur, Lamb s quarters, rough redroot Pigweed,  Dock/Sorrel) -    13 Mug wort (artemisia vulgare) -    14 Ragweed giant/short (ambrosia spp) -    15 White birch (Betula papyrifera) -    16 Tree mix 1 (Pecan, Maple BHR, Oak RVW, american [place], black [place]) -    17 Red cedar (juniperus virginia) -    18 Tree mix 2   (white Alvin, river/red Birch, black [place], common [place], american Elm) -    19 Box elder/Maple mix (acer spp) -    20 [place] shagbark (carya ovata) -           Conclusion: no clear signs for positive reaction in atopy screen prick tests      Intradermal Testing (Placed Aug 14, 2023)  No Substance Conc.  Reading (15min)  immediate Papule [mm] / Erythema [mm] Reading   (... days)  delayed Papule [mm] / Erythema [mm] Remarks   DF Standard Dust Mite - D. Farinae 1:10 -   -    DP Standard Dust Mite - D. Pteronyssinus 1:10 -   -    A Aspergillus fumigatus  1:10 -   -    P Penicillium notatum 1:10 -   -    Conclusions: no immediate type reaction. Will see if any delayed reaction    [x] No relevant allergic reaction observed    [] Allergic reaction diagnosed against following allergens:      Interpretation/ remarks:   See later    [] Patient information given   [] ACDS CAMP information's (# ....) to following compounds: .....   [] General information's to following compounds: ......      Assessment & Plan:    ==> Final Diagnosis:     # Atopic predisposition? With  Seasonal RC and Rhinosinusitis in spring  Perennial RC and Sinusitis (more at night)  Family hx of Asthma  St after IT at home (what injected?)  * chronic illness with exacerbation, progression, side effects from treatment    # physical Urticaria triggered by heat and stress  Some improvement on Xolair  * chronic illness with exacerbation, progression, side effects from treatment    # recurrent pruritic, papular dermatitis on trunk and extremities  --> one biopsy eczematous  --> another biopsy TIARRA Vann?  DDx allergic contact dermatitis  DDx atopic dermatitis  DDx TIARRA Vann type  *  chronic illness with exacerbation, progression, side effects from treatment     # hairloss with scalp irritation = allergic contact dermatitis?  * chronic illness with exacerbation, progression, side effects from treatment    # multiple drug allergies  Nitrofurantoin = hives and then turning into black erosions? No mucosal involvement  Sulfonamides = hives  Cephalexin = hives  * chronic illness with exacerbation, progression, side effects from treatment    These conclusions are made at the best of one's knowledge and belief based on the provided evidence such as patient's history and allergy test results and they can change over time or can be incomplete because of missing information's.    ==> Treatment Plan:  See later  ___________________________    Staff: : provider    Follow-up in Derm-Allergy clinic for 2nd readings and final conclusions after 4 days   ___________________________    I spent a total of 20 minutes with Va Riddle during today s  visit. This time was spent discussing all the individual test results, correlating them to the clinical relevance, counseling the patient and/or coordinating care        Again, thank you for allowing me to participate in the care of your patient.        Sincerely,        Jose Amezcua MD

## 2023-08-17 NOTE — PROGRESS NOTES
[facility] Dermato-allergology Note  Office visit  Encounter Date: Aug 18, 2023  ____________________________________________    CC: No chief complaint on file.      HPI:  (Aug 18, 2023)  Ms. Va Riddle is a(n) 43 year old female who presents today as a return patient for allergy consultation  - Follow-up in Derm-Allergy clinic for 2nd readings and final conclusions after 4 days   - otherwise feeling well in usual state of health    Physical exam:  General: In no acute distress, well-developed, well-nourished  Eyes: no conjunctivitis  ENT: no signs of rhinitis   Pulmonary: no wheezing or coughing  Skin:Focused examination of the skin on test sites was performed = see test results below    Earlier History and Allergy exams:  (Aug 16, 2023)  - Follow-up in Derm-Allergy clinic for 1st readings of patch tests after 2 days      Earlier History and Allergy exams:  (Aug 14, 2023)  - Follow-up in Derm-Allergy clinic for allergy tests as planned (has to be off systemic antihistamines for about 1 week --> topical or inhaled steroids OK as long not on the back)   On the entire back colorful tattoo, have to test over it    Earlier History and Allergy exams:  (May 15, 2023)  2 different kinds of rash.  First rash, maybe started ~7 years ago. Describes as scaly brown/pink dots on chest and backs. 5 years ago, became particularly bothersome, would have to wear different shirts and found herself having to cover rash up.. The lesions became angry and spread, recently has spread up/down to legs and all over back. Went to local dermatologist, thought it was dermatitis. Has been treated with cortisone, betamethasone, tretinoin, triamcinolone, different eczema lotions. Has tried Singulair, claritin, benadryl (currently itches with benadryl now). Started Xolair which initially seemed to suppress ~70% of the rash, however has become less efficacious over time and rash has not resolved. Dad and  nieces/nephews have asthma. 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 Has had allergy testing in the past several years ago and received allergy shots.    Also has another rash, describes as big, red/purple, splotchy swelling typically localized in upper body up to neck. Feels prickly/staticky prior to onset. This rash tends to be triggered by heat, eating anything warm or in sun, emotional stimulus, high BP. It comes and goes, can happen up to several times a day. Cold water helps with rash. Does not improve with allergy medicines. Gets tickle in back of throat, won't go away.    Dad, not 100% sure is dad, pretty significant skin stuff. Liver spots. Warts, patches.    Laundry detergent: Gain, has used all different kinds; no difference, no itching when put clothes on. Bleach slightly reactive. Soaps: bodywash dove sensitive, Aveeno oatmeal kind, whatever body washes. Didn't find too much. Does use spray tan pretty frequently; doesn't see much of a difference. Bumps do become more prominent after spray tan.      Past Medical History:   There is no problem list on file for this patient.    No past medical history on file.    Allergies:  Allergies   Allergen Reactions    Metoclopramide Hives     Also felt edgy and claustrophobic along with feeling itchy everywhere.  Doesn't ever want it again IV or oral    Sulfa Antibiotics Hives and Unknown    Cephalexin Other (See Comments)    Nitrofurantoin        Medications:  Current Outpatient Medications   Medication    amphetamine-dextroamphetamine (ADDERALL) 20 MG tablet    atenolol (TENORMIN) 25 MG tablet    betamethasone dipropionate (DIPROSONE) 0.05 % external cream    clobetasol (TEMOVATE) 0.05 % external solution    ketoconazole (NIZORAL) 2 % external shampoo    linaclotide (LINZESS) 290 MCG capsule    MINOXIDIL FOR WOMEN EX    sucralfate (CARAFATE) 1 GM tablet    UBRELVY 100 MG tablet    XOLAIR 150 MG/ML SOSY injection     Current  Facility-Administered Medications   Medication    nitroFURantoin macrocrystal-monohydrate (MACROBID) capsule 100 mg       Social History:  The patient  for [employer], process donations and construction. Patient has the following hobbies or non-occupational exposure work out, dance, play with kids, ride motorcycles    Family History:  No family history on file.    Previous Labs, Allergy Tests, Dermatopathology, Imaging:    Previous TRUE Test: 3/1/23:   1+ reactions:   - Nickel sulfate  Paraben Mix  Thimersal   Allergens showing irritant reactions  2-bromo-2-nitropropene-1,3-diol    Referred By: No referring provider defined for this encounter.     Allergy Tests:    Past Allergy Test    Order for Future Allergy Testing:    [x] Outpatient  [] Inpatient: [facility]..../ Bed ....       Skin Atopy (atopic dermatitis) [x] Yes   [] No local derm says she has eczema  Contact allergies:   [x] Yes   [] No ..........   Hand eczema:   [x] Yes   [] No goldbond eczema helps, look crepey           Leading hand:   [] R   [] L       [] Ambidextrous         Drug allergies:        [x] Yes   [] No  Which? Extreme hives turned into SJS nitrofurantoin  Urticaria/Angioedema  [x] Yes   [] No .........  Food Allergy:  [] Yes   [x] No  which?......  Pets :  [x] Yes   [] No  Which?1 dog         [x]  Rhinitis   [x] Conjunctivitis   [x] Sinusitis   [] Polyposis   [x] Otitis (fluid/discharge behind ears)   [] Pharyngitis         [x]  Postnasal drip    []  none  Operations:   [] Tonsils   [] Septum   [] Sinus   [] Polyposis        [] Asthma bronchiale   [x] Coughing      []  none  Symptoms (mostly Rhinoconjunctivitis and Asthma) aggravated by:  Season   [] I   [] II   [] III   [x] IV   [x]V   [x]VI   []VII   []VIII   []IX   []X   []XI   []XII     [x] perennial   Day time      [] morning   [] noon      [] evening        [x] night    [x] whole day........  []  none  Location/changes    [] inside        [] outside   [] mountains     [] sea     [] others.............   [x]  none  Triggers, specific     [] animals     [] plants     [] dust              [] others ...........................    [x]  none  Triggers, others       [] work          [] psyche    [] sport            [] others .............................  [x]  none  Irritant                [] phys efforts [] smoke    [] heat/cold     [] odors  [x]others. Air turn on . []  none    Order for PATCH TESTS  Reason for tests (suspected allergy): eczematous rash on trunk and extremities and scalp = ACD?  Known previous allergies: had true test in the past borderline pos to Nickel, Parabens. Thimerosal  Standardized panels  [x] Standard panel (40 tests)  [x] Preservatives & Antimicrobials (31 tests)  [x] Emulsifiers & Additives (25 tests)   [x] Perfumes/Flavours & Plants (25 tests)  [x] Hairdresser panel (12 tests)  [] Rubber Chemicals (22 tests)  [x] Plastics (26 tests)  [] Colorants/Dyes/Food additives (20 tests)  [] Metals (implants/dental) (24 tests)  [] Local anaesthetics/NSAIDs (13 tests)  [x] Antibiotics & Antimycotics (14 tests)   [] Corticosteroids (15 tests)   [] Photopatch test (62 tests)   [] others: ...      [x] Patient's own products: .spray tan  DO NOT test if chemical or biological identity is unknown!   always ask from patient the product information and safety sheets (MSDS)       Order for PRICK TESTS    Reason for tests (suspected allergy): atopy?  Known previous allergies: had IT at home and now tests negatives?    Standardized prick panels  [x] Atopic panel (20 tests)  [] Pediatric Panel (12 tests)  [] Milk, Meat, Eggs, Grains (20 tests)   [] Dust, Epithelia, Feathers (10 tests)  [] Fish, Seafood, Shellfish (17 tests)  [] Nuts, Beans (14 tests)  [] Spice, Vegetable, Fruit (17 tests)  [] Pollen Panel = Tree, Grass, Weed (24 tests)  [] Others: ...      [] Patient's own products: ...  DO NOT test if chemical or biological identity is unknown!   always ask from patient the  product information and safety sheets (MSDS)     Standardized intradermal tests  [x] Penicillium notatum [x] Aspergillus fumigatus [x] House dust mites D.far & D. pteron  [] Cat    [] dog  [] Others: ...  [] Bee venom   [] Wasp venom  !!Specific protocol with dilutions!!       Order for Drug allergy tests (prick & Intradermal & patch tests)    [] Penicillin G  [] Ampicillin [x] Cefazolin   [x] Ceftriaxone   [] Ceftazidime  [x] Bactrim    [x] Others: Nitrofurantoin  Order for ... as test date    ________________________________  RESULTS & EVALUATION of PATCH TESTS    Aug 14, 2023 application of patch tests:    Patch test readings after     [x] 2 days, [] 3 days [x] 4 days, [] 5 days,  Other duration: ...    STANDARD Series                                          # Substance 2 days 4 days remarks     1 Francisco Mix [C] - -       2 Colophony - -       3  2-Mercaptobenzothiazole  - -       4 Methylisothiazolinone - -       5 Carba Mix - -       6 Thiuram Mix [A] - -       7 Bisphenol A Epoxy Resin - -       8 C-Ruuf-Zqrstvheemi-Formaldehyde Resin - -       9 Mercapto Mix [A] - -       10 Black Rubber Mix- PPD [B] - -       11 Potassium Dichromate  -  -       12 Balsam of [place] (Myroxylon Pereirae Resin) - -       13 Nickel Sulphate Hexahydrate - -       14 Mixed Dialkyl Thiourea - -       15 Paraben Mix [B] - -       16 Methyldibromo Glutaronitrile - -       17 Fragrance Mix 8% - -       18 2-Bromo-2-Nitropropane-1,3-Diol (Bronopol) CT - -       19 Lyral - -       20 Tixocortol-21- Pivalate CT - -       21 Diazolidinyl urea (Germall II) - -        22 Methyl Methacrylate - -       23 Cobalt (II) Chloride Hexahydrate - -       24 Fragrance Mix II  - -       25 Compositae Mix - -       26 Benzoyl Peroxide - -       27 Bacitracin - -       28 Formaldehyde - -       29 Methylchloroisothiazolinone / Methylisothiazolinone - -       30 Corticosteroid Mix CT - -       31 Sodium Lauryl Sulfate - -       32 Lanolin Alcohol - -        33 Turpentine - -       34 Cetylstearylalcohol - -       35 Chlorhexidine Dicluconate - -       36 Budesonide - -       37 Imidazolidinyl Urea  - -       38 Ethyl-2 Cyanoacrylate - -       39 Quaternium 15 (Dowicil 200) - -       40 Decyl Glucoside - -      PRESERVATIVES & ANTIMICROBIALS        # Substance 2 days 4 days remarks   41 1  1,2-Benzisothiazoline-3-One, Sodium Salt - -     2  1,3,5-Martinez (2-Hydroxyethyl) - Hexahydrotriazine (Grotan BK) - -     3 2-Yssfsjftrmaio-1-Nitro-1, 3-Propanediol NA NA     4  3, 4, 4' - Triclocarban - -    45 5 4 - Chloro - 3 - Cresol - -     6 4 - Chloro - 4 - Xylenol (PCMX) - -     7 7-Ethylbicyclooxazolidine (Bioban [id number]) - -     8 Benzalkonium Chloride CT - -     9 Benzyl Alcohol - -    50 10 Cetalkonium Chloride - -     11 Cetylpyrimidine Chloride  - -     12 Chloroacetamide - -     13 DMDM Hydantoin - -     14 Glutaraldehyde - -    55 15 Triclosan - -     16 Glyoxal Trimeric Dihydrate - -     17 Iodopropynyl Butylcarbamate - -     18 Octylisothiazoline - -     19 Bithionol CT - -    60 20 Bioban P 1487 (Nitrobutyl) Morpholine/(Ethylnitro-Trimethylene) Dimorpholine - -     21 Phenoxyethanol - -     22 Phenyl Salicylate - -     23 Povidone Iodine - -     24 Sodium Benzoate - -    65 25 Sodium Disulfite - -     26 Sorbic Acid - -     27 Thimerosal - -     28 Melamine Formaldehyde Resin - -     29 Ethylenediamine Dihydrochloride - -      Parabens      70 30 Butyl-P-Hydroxybenzoate - -     31 Ethyl-P-Hydroxybenzoate - -     32 Methyl-P-Hydroxybenzoate - -    73 33 Propyl-P-Hydroxybenzoate - -     EMULSIFIERS & ADDITIVES       # Substance 2 days 4 days remarks   74 1 Polyethylene Glycol-400 - -    75 2 Cocamidopropyl Betaine - -     3 Amerchol L101 - -     4 Propylene Glycol - -     5 Triethanolamine - -     6 Sorbitane Sesquiolate CT - -    80 7 Isopropylmyristate - -     8 Polysorbate 80 CT - -     9 Amidoamine   (Stearamidopropyl Dimethylamine) - -     10 Oleamidopropyl  Dimethylamine - -     11 Lauryl Glucoside - -    85 12 Coconut Diethanolamide  - -     13 2-Hydroxy-4-Methoxy Benzophenone (Oxybenzone) - -     14 Benzophenone-4 (Sulisobenzon) - -     15 Propolis - -     16 Dexpanthenol - -    90 17 Abitol - -     18 Tert-Butylhydroquinone - -     19 Benzyl Salicylate - -     20 Dimethylaminopropylamin (DMPA) - -     21 Zinc Pyrithione (Zinc Omadine)  - -    95 22 Martinez(Hydroxymethyl) Nitromethane  - -      Antioxidant       23 Dodecyl Gallate - -     24 Butylhydroxyanisole (BHA) - -     25 Butylhydroxytoluene (BHT) - -     26 Di-Alpha-Tocopherol (Vit E) - -    100 27 Propyl Gallate - -     PERFUMES, FLAVORS & PLANTS        # Substance 2 days 4 days remarks   101 1 Benzyl Cinnamate - -     2 Di-Limonene (Dipentene) - -     3 Cananga Odorata (Aldo Carlson) (I) - -     4 Lichen Acid Mix - -    105 5 Mentha Piperita Oil (Peppermint Oil) - -     6 Sesquiterpenelactone mix - -     7 Tea Tree Oil, Oxidized - -     8 Wood Tar Mix - -     9 Abietic Acid - -    110 10 Lavendula Angustifolia Oil (Lavender Oil) - -     11 Fragrance mix II CT * 14% - -      Fragrance Mix I       12 Oakmoss Absolute - -     13 Eugenol - -     14 Geraniol - -    115 15 Hydroxycitronellal - -     16 Isoeugenol - -     17 Cinnamic Aldehyde - -     18 Cinnamic Alcohol  - -      Fragrance mix II       19 Citronellol - -    120 20 Alpha-Hexylcinnamic Aldehyde    - -     21 Citral - -     22 Farnesol - -    123 23 Coumarin - -    Hexylcinnamic aldehyde, Coumarin, Farnesol, Hydroxyisohexy3-cyclohexene carboxaldehyde, citral, citrolellol  HAIRDRESSER        # Substance 2 days 4 days remarks   124 1 P-Phenylenediamine -  +    125 2 P-Toluenediamine Sulphate  -  -     3 Ammonium Thioglycolate - -     4 Ammonium Persulfate - -     5 Resorcinol - -     6 3-Aminophenol - -    130 7 P-Aminophenol - +     8 Glyceryl Monothioglycolate - -    132 9 Pyrogallol - -     PLASTICS (Acrylates/Epoxy Systems)       # Substance 2 days 4  days remarks     Acrylates - -    133 1 2-Hydroxyethyl Methacrylate (HEMA) - -     2 1,4-Butandioldimethacrylate (BUDMA) - -    135 3  2-Ethylhexyl Acrylate - -     4 Bisphenol-A-Dimethacrylate  - -     5 Diurethane-Dimethacrylate - -     6 Ethyleneglycoldimethacrylate (EGDMA) - -     7 Pentaerythritoltriacrylate (KEITH) - -    140 8 Triethylene Glycol Dimethacrylate (TEGDMA) - -      Synthetic material/additives        9 N-Mzoa-Yvnsuvakdpq - -     10 Tricresyl Phosphate - -     11 3-Pury-Aagetrxedcyqy - -     12 Dioctyl phtalate(DEHP,DOP) / (Dimethylhexylphthalate)  - -    145 13 Dibutylphthalate - -     14 Dimethylphthalate - -     15 Toluene-2,4-Diisocyanate NA NA     16 Diphenylmethane-4,4''-Diisocyanate NA NA      EPOXY RESIN SYSTEMS        Reactive Solvents - -     17 Cresyl Glycidyl Ether NA NA    150 18 Butyl Glycidyl Ether - -     19 Phenyl Glycidyl Ether - -     20 1,4-Butanediol Diglycidyl Ether - -     21 1,6-Hexanediole Diglycidyl Ether - -      Hardener / Accelerator - -     22 Triethylenetetramine - -    155 23 Diethylenetriamine - -     24 Isophorone Diamine (IPD) - -     25 N,N-Dimethyl-P-Toluidine - -    158 26 Isobornyl Acrylate - -     ANTIBIOTICS & ANTIMYCOTICS    # Substance 2 days 4 days remarks   159 1 Erythromycin - -    160 2 Framycetin Sulfate - -     3 Fusidic Acid Sodium Salt - -     4 Gentamicin Sulfate - -     5 Neomycin Sulfate - -     6 Oxytetracycline  - -    165 7 Polymyxin B Sulfate - -     8 Tetracycline-HCL - -     9 Sulfanilamide - -     10 Metronidazole - -     11 Nitrofurazone - -    170 12 Nystatin - -     13 Clotrimazole - -     14 Clioquinol 5% - -     15 Miconazole  - -    174 16 Tobramycine        OTHER PRODUCTS        # Substance Conc  % solv 2 days 4 days remarks   175 1 [place] spray tan   - -    176 2 Lesia dry shampoo   - -        Results of patch tests:                         Interpretation:  - Negative                    A    =  Allergic      (+) Erythema    TI   = Toxic/irritant   + E + Infiltration    RaP = Relevance at Present     ++ E/I + Papulovesicle   Rpr  = Relevance Previously     +++ E/I/P + Blister     nR   = No Relevance      DRUG ALLERGY TEST SERIES Aug 14, 2023    ANTIBIOTICS    Prick Tests         Substance/ Allergen Conc Result (20 min) Remarks   1 Cefazolin[1] 100 mg/ml -    2 Ceftriaxone* [2] 100 mg/ml -    3 Bactrim 80/400 mg/ml -    4 Nitrofurantoin powder 100 mg -       Intradermal Tests   immed immed delay delay      Substance Conc 1st dil  2nd dil  2 days  4 days remarks   1 Cefazolin[1] 1:5 -   -    2 Ceftriaxone* [2] 1:5 -   -    3 Bactrim 1:100 -   -        Patch Tests  as is as is 1:2 1:2     As Is  Vas Substance Conc 2 days 4 days 2 days 4 days remarks   1 2 Cefazolin[1] 100 mg/ml - - - -    3 4 Ceftriaxone* [2] 100 mg/ml - -  -    5 6 Bactrim 80/400 mg/ml - - (+) -    7 8 Nitrofurantoin powder 100 mg - - - -    [1]  Cefalexin/Cefazolin-group        [2]    Cefotaxim-group     [3]     Cefuroxim-group      Atopy Screen (Placed Aug 14, 2023)  No Substance Readings (15 min) Evaluation   POS Histamine 1mg/ml ++    NEG NaCl 0.9% - dermographism     No Substance Readings (15 min) Evaluation   1 Alternaria alternata (tenuis)  -    2 Cladosporium herbarum -    3 Aspergillus fumigatus -    4 Penicillium notatum -    5 Dermatophagoides pteronyssinus -    6 Dermatophagoides farinae -    7 Dog epithelium (canis spp) -    8 Cat hair (jony catus) -    9 Cockroach   (Blatella americana & germanica) -    10 Grass mix midwest   (Parris, Orchard, Redtop, Waqas) -    11 Gerardo grass (sorghum halepense) -    12 Weed mix   (common Cocklebur, Lamb s quarters, rough redroot Pigweed, Dock/Sorrel) -    13 Mug wort (artemisia vulgare) -    14 Ragweed giant/short (ambrosia spp) -    15 White birch (Betula papyrifera) -    16 Tree mix 1 (Pecan, Maple BHR, Oak RVW, american Ripley, black [place]) -    17 Red cedar (juniperus virginia) -     18 Tree mix 2   (white Alvin, river/red Birch, black [place], common [place], american Elm) -    19 Box elder/Maple mix (acer spp) -    20 Roane shagbark (carya ovata) -           Conclusion: no clear signs for positive reaction in atopy screen prick tests      Intradermal Testing (Placed Aug 14, 2023)  No Substance Conc.  Reading (15min)  immediate Papule [mm] / Erythema [mm] Reading   (4 days)  delayed Papule [mm] / Erythema [mm] Remarks   DF Standard Dust Mite - D. Farinae 1:10 -  + 5/5    DP Standard Dust Mite - D. Pteronyssinus 1:10 -  (+) 4/4    A Aspergillus fumigatus  1:10 -   -    P Penicillium notatum 1:10 -   -    Conclusions: no immediate type reaction. Some delayed type reactions to dust mites in intradermal tests    [] No relevant allergic reaction observed    [x] Allergic reaction diagnosed against following allergens:    Hair dye: + paraphenylene diamine and + p-aminophenole      Interpretation/ remarks:   Certainly some contact allergy to hair dye and as well atopic dermatitis    [] Patient information given   [] ACDS CAMP information's (# [id number], and [id number]) to following compounds: .....   [] General information's to following compounds: ......      Assessment & Plan:    ==> Final Diagnosis:     # Atopic predisposition with  Seasonal RC and Rhinosinusitis in spring  Perennial RC and Sinusitis (more at night) = delayed type reaction to dust mites  Family hx of Asthma  St after IT at home (what injected?)  * chronic illness with exacerbation, progression, side effects from treatment    # physical Urticaria triggered by heat and stress  Some improvement on Xolair  * chronic illness with exacerbation, progression, side effects from treatment    # recurrent pruritic, papular dermatitis on trunk and extremities  > Partially allergic contact dermatitis to hair dye  > partially atopic dermatitis  DDx TIARRA Vann type  * chronic illness with exacerbation, progression, side effects from  treatment     # hairloss with scalp irritation = allergic contact dermatitis?  * chronic illness with exacerbation, progression, side effects from treatment    # multiple drug allergies  Nitrofurantoin = hives and then turning into black erosions? No mucosal involvement = skin tests negatives, but patient had strong clinical reaction (more delayed type) ==> for safety reasons still avoid  Sulfonamides = hives ==> skin tests neg  Cephalexin = hives ==> skin tests neg  * chronic illness with exacerbation, progression, side effects from treatment      These conclusions are made at the best of one's knowledge and belief based on the provided evidence such as patient's history and allergy test results and they can change over time or can be incomplete because of missing information's.    ==> Treatment Plan:    >> follow the recommendations of CAMP Shahbaz and try to use products for hair dye and toning shampoos only from the shahbaz    >> info given HOUSE DUST MITES reduction and maybe if in winter again sinus problems, try to use Nasonex nasal spray (antihistamines might not help with delayed reaction)    >> in prick, intradermal and patch tests no signs for specific immediate or delayed type reactions to .Cephaloporines (Cefazolin and Ceftriaxone) and Sulfonamide Bactrim   ==> if patient needs these medications, they can be used. Keep patient 30min under observation in clinic for first, initial dose and give before and during antibiotic treatment high dose of antihistamines (e.g. Allegra 180mg 2xdaily)  >> because reaction to Nitrofurantoin was recent and more delayed type reaction I would recommend to avoid it despite negative skin tests      >> after bee sting? as child strong local reaction ==> maybe today spec IgE to bee and wasp    ___________________________      Staff: : provider    Follow-up in Derm-Allergy clinic if necessary ==> sees today Dr. Carbone  ___________________________    I spent a total of 35 minutes  with Va Riddle during today s  visit. This time was spent discussing all the individual test results, correlating them to the clinical relevance, counseling the patient and/or coordinating care. Moreover time was spent to install and explain the CAMP Shahbaz from [employer] with the informations on the individual allergens and propositions of products that can be used. Please see Assessment and Plan for additional details.

## 2023-08-18 ENCOUNTER — OFFICE VISIT (OUTPATIENT)
Dept: DERMATOLOGY | Facility: CLINIC | Age: 43
End: 2023-08-18
Payer: COMMERCIAL

## 2023-08-18 ENCOUNTER — OFFICE VISIT (OUTPATIENT)
Dept: ALLERGY | Facility: CLINIC | Age: 43
End: 2023-08-18
Payer: COMMERCIAL

## 2023-08-18 ENCOUNTER — LAB (OUTPATIENT)
Dept: LAB | Facility: CLINIC | Age: 43
End: 2023-08-18
Payer: COMMERCIAL

## 2023-08-18 VITALS — DIASTOLIC BLOOD PRESSURE: 80 MMHG | SYSTOLIC BLOOD PRESSURE: 113 MMHG

## 2023-08-18 DIAGNOSIS — L23.89 ALLERGIC CONTACT DERMATITIS DUE TO OTHER AGENTS: ICD-10-CM

## 2023-08-18 DIAGNOSIS — L65.9 LOSS OF HAIR: ICD-10-CM

## 2023-08-18 DIAGNOSIS — Z88.9 MULTIPLE DRUG ALLERGIES: ICD-10-CM

## 2023-08-18 DIAGNOSIS — L30.9 DERMATITIS: ICD-10-CM

## 2023-08-18 DIAGNOSIS — L11.1 GROVER'S DISEASE: ICD-10-CM

## 2023-08-18 DIAGNOSIS — L23.5 ALLERGIC DERMATITIS DUE TO OTHER CHEMICAL PRODUCT: ICD-10-CM

## 2023-08-18 DIAGNOSIS — L20.89 OTHER ATOPIC DERMATITIS: ICD-10-CM

## 2023-08-18 DIAGNOSIS — J30.2 SEASONAL AND PERENNIAL ALLERGIC RHINOCONJUNCTIVITIS: ICD-10-CM

## 2023-08-18 DIAGNOSIS — J30.89 SEASONAL AND PERENNIAL ALLERGIC RHINOCONJUNCTIVITIS: ICD-10-CM

## 2023-08-18 DIAGNOSIS — L65.9 LOSS OF HAIR: Primary | ICD-10-CM

## 2023-08-18 DIAGNOSIS — H10.10 SEASONAL AND PERENNIAL ALLERGIC RHINOCONJUNCTIVITIS: ICD-10-CM

## 2023-08-18 DIAGNOSIS — R23.8 PAPULE OF SKIN: ICD-10-CM

## 2023-08-18 DIAGNOSIS — Z88.9 ATOPY: ICD-10-CM

## 2023-08-18 DIAGNOSIS — Z91.030 BEE ALLERGY STATUS: ICD-10-CM

## 2023-08-18 DIAGNOSIS — Z91.030 BEE ALLERGY STATUS: Primary | ICD-10-CM

## 2023-08-18 LAB
ALBUMIN SERPL BCG-MCNC: 5.1 G/DL (ref 3.5–5.2)
ALP SERPL-CCNC: 49 U/L (ref 35–104)
ALT SERPL W P-5'-P-CCNC: 41 U/L (ref 0–50)
ANION GAP SERPL CALCULATED.3IONS-SCNC: 12 MMOL/L (ref 7–15)
AST SERPL W P-5'-P-CCNC: 41 U/L (ref 0–45)
BASOPHILS # BLD AUTO: 0 10E3/UL (ref 0–0.2)
BASOPHILS NFR BLD AUTO: 0 %
BILIRUB SERPL-MCNC: 1.1 MG/DL
BUN SERPL-MCNC: 18 MG/DL (ref 6–20)
CALCIUM SERPL-MCNC: 9.6 MG/DL (ref 8.6–10)
CHLORIDE SERPL-SCNC: 102 MMOL/L (ref 98–107)
CREAT SERPL-MCNC: 0.61 MG/DL (ref 0.51–0.95)
DEPRECATED HCO3 PLAS-SCNC: 25 MMOL/L (ref 22–29)
EOSINOPHIL # BLD AUTO: 0 10E3/UL (ref 0–0.7)
EOSINOPHIL NFR BLD AUTO: 1 %
ERYTHROCYTE [DISTWIDTH] IN BLOOD BY AUTOMATED COUNT: 13.1 % (ref 10–15)
FERRITIN SERPL-MCNC: 192 NG/ML (ref 6–175)
GFR SERPL CREATININE-BSD FRML MDRD: >90 ML/MIN/1.73M2
GLUCOSE SERPL-MCNC: 95 MG/DL (ref 70–99)
HCT VFR BLD AUTO: 44 % (ref 35–47)
HGB BLD-MCNC: 15 G/DL (ref 11.7–15.7)
IMM GRANULOCYTES # BLD: 0 10E3/UL
IMM GRANULOCYTES NFR BLD: 0 %
IRON BINDING CAPACITY (ROCHE): 372 UG/DL (ref 240–430)
IRON SATN MFR SERPL: 43 % (ref 15–46)
IRON SERPL-MCNC: 160 UG/DL (ref 37–145)
LYMPHOCYTES # BLD AUTO: 2.3 10E3/UL (ref 0.8–5.3)
LYMPHOCYTES NFR BLD AUTO: 33 %
MCH RBC QN AUTO: 32.1 PG (ref 26.5–33)
MCHC RBC AUTO-ENTMCNC: 34.1 G/DL (ref 31.5–36.5)
MCV RBC AUTO: 94 FL (ref 78–100)
MONOCYTES # BLD AUTO: 0.3 10E3/UL (ref 0–1.3)
MONOCYTES NFR BLD AUTO: 5 %
NEUTROPHILS # BLD AUTO: 4.4 10E3/UL (ref 1.6–8.3)
NEUTROPHILS NFR BLD AUTO: 61 %
NRBC # BLD AUTO: 0 10E3/UL
NRBC BLD AUTO-RTO: 0 /100
PLATELET # BLD AUTO: 295 10E3/UL (ref 150–450)
POTASSIUM SERPL-SCNC: 3.8 MMOL/L (ref 3.4–5.3)
PROT SERPL-MCNC: 7.4 G/DL (ref 6.4–8.3)
RBC # BLD AUTO: 4.67 10E6/UL (ref 3.8–5.2)
SODIUM SERPL-SCNC: 139 MMOL/L (ref 136–145)
WBC # BLD AUTO: 7.1 10E3/UL (ref 4–11)

## 2023-08-18 PROCEDURE — 88305 TISSUE EXAM BY PATHOLOGIST: CPT | Mod: 26 | Performed by: DERMATOLOGY

## 2023-08-18 PROCEDURE — 82627 DEHYDROEPIANDROSTERONE: CPT | Performed by: DERMATOLOGY

## 2023-08-18 PROCEDURE — 82728 ASSAY OF FERRITIN: CPT | Performed by: PATHOLOGY

## 2023-08-18 PROCEDURE — 84403 ASSAY OF TOTAL TESTOSTERONE: CPT | Performed by: DERMATOLOGY

## 2023-08-18 PROCEDURE — 86003 ALLG SPEC IGE CRUDE XTRC EA: CPT | Performed by: DERMATOLOGY

## 2023-08-18 PROCEDURE — 83550 IRON BINDING TEST: CPT | Performed by: PATHOLOGY

## 2023-08-18 PROCEDURE — 11104 PUNCH BX SKIN SINGLE LESION: CPT | Performed by: DERMATOLOGY

## 2023-08-18 PROCEDURE — 99214 OFFICE O/P EST MOD 30 MIN: CPT | Mod: 25 | Performed by: DERMATOLOGY

## 2023-08-18 PROCEDURE — 88305 TISSUE EXAM BY PATHOLOGIST: CPT | Mod: TC | Performed by: DERMATOLOGY

## 2023-08-18 PROCEDURE — 99214 OFFICE O/P EST MOD 30 MIN: CPT | Performed by: DERMATOLOGY

## 2023-08-18 PROCEDURE — 99000 SPECIMEN HANDLING OFFICE-LAB: CPT | Performed by: PATHOLOGY

## 2023-08-18 PROCEDURE — 85025 COMPLETE CBC W/AUTO DIFF WBC: CPT | Performed by: PATHOLOGY

## 2023-08-18 PROCEDURE — 80053 COMPREHEN METABOLIC PANEL: CPT | Performed by: PATHOLOGY

## 2023-08-18 PROCEDURE — 36415 COLL VENOUS BLD VENIPUNCTURE: CPT | Performed by: PATHOLOGY

## 2023-08-18 PROCEDURE — 84270 ASSAY OF SEX HORMONE GLOBUL: CPT | Performed by: DERMATOLOGY

## 2023-08-18 PROCEDURE — 83540 ASSAY OF IRON: CPT | Performed by: PATHOLOGY

## 2023-08-18 ASSESSMENT — PAIN SCALES - GENERAL: PAINLEVEL: NO PAIN (0)

## 2023-08-18 NOTE — LETTER
8/18/2023         RE: Va Riddle  [address]  [facility] [address]        Dear Colleague,    Thank you for referring your patient, Va Riddle, to the [facility] [place]. Please see a copy of my visit note below.    [facility] Dermato-allergology Note  Office visit  Encounter Date: Aug 18, 2023  ____________________________________________    CC: No chief complaint on file.      HPI:  (Aug 18, 2023)  Ms. Va Riddle is a(n) 43 year old female who presents today as a return patient for allergy consultation  - Follow-up in Derm-Allergy clinic for 2nd readings and final conclusions after 4 days   - otherwise feeling well in usual state of health    Physical exam:  General: In no acute distress, well-developed, well-nourished  Eyes: no conjunctivitis  ENT: no signs of rhinitis   Pulmonary: no wheezing or coughing  Skin:Focused examination of the skin on test sites was performed = see test results below    Earlier History and Allergy exams:  (Aug 16, 2023)  - Follow-up in Derm-Allergy clinic for 1st readings of patch tests after 2 days      Earlier History and Allergy exams:  (Aug 14, 2023)  - Follow-up in Derm-Allergy clinic for allergy tests as planned (has to be off systemic antihistamines for about 1 week --> topical or inhaled steroids OK as long not on the back)   On the entire back colorful tattoo, have to test over it    Earlier History and Allergy exams:  (May 15, 2023)  2 different kinds of rash.  First rash, maybe started ~7 years ago. Describes as scaly brown/pink dots on chest and backs. 5 years ago, became particularly bothersome, would have to wear different shirts and found herself having to cover rash up.. The lesions became angry and spread, recently has spread up/down to legs and all over back. Went to local dermatologist, thought it was dermatitis. Has been treated with cortisone, betamethasone, tretinoin,  triamcinolone, different eczema lotions. Has tried Singulair, claritin, benadryl (currently itches with benadryl now). Started Xolair which initially seemed to suppress ~70% of the rash, however has become less efficacious over time and rash has not resolved. Dad and nieces/nephews have asthma. 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 Has had allergy testing in the past several years ago and received allergy shots.    Also has another rash, describes as big, red/purple, splotchy swelling typically localized in upper body up to neck. Feels prickly/staticky prior to onset. This rash tends to be triggered by heat, eating anything warm or in sun, emotional stimulus, high BP. It comes and goes, can happen up to several times a day. Cold water helps with rash. Does not improve with allergy medicines. Gets tickle in back of throat, won't go away.    Dad, not 100% sure is dad, pretty significant skin stuff. Liver spots. Warts, patches.    Laundry detergent: Gain, has used all different kinds; no difference, no itching when put clothes on. Bleach slightly reactive. Soaps: bodywash dove sensitive, Aveeno oatmeal kind, whatever body washes. Didn't find too much. Does use spray tan pretty frequently; doesn't see much of a difference. Bumps do become more prominent after spray tan.      Past Medical History:   There is no problem list on file for this patient.    No past medical history on file.    Allergies:  Allergies   Allergen Reactions     Metoclopramide Hives     Also felt edgy and claustrophobic along with feeling itchy everywhere.  Doesn't ever want it again IV or oral     Sulfa Antibiotics Hives and Unknown     Cephalexin Other (See Comments)     Nitrofurantoin        Medications:  Current Outpatient Medications   Medication     amphetamine-dextroamphetamine (ADDERALL) 20 MG tablet     atenolol (TENORMIN) 25 MG tablet     betamethasone dipropionate (DIPROSONE) 0.05 % external  cream     clobetasol (TEMOVATE) 0.05 % external solution     ketoconazole (NIZORAL) 2 % external shampoo     linaclotide (LINZESS) 290 MCG capsule     MINOXIDIL FOR WOMEN EX     sucralfate (CARAFATE) 1 GM tablet     UBRELVY 100 MG tablet     XOLAIR 150 MG/ML SOSY injection     Current Facility-Administered Medications   Medication     nitroFURantoin macrocrystal-monohydrate (MACROBID) capsule 100 mg       Social History:  The patient  for [employer] and [employer]. Patient has the following hobbies or non-occupational exposure work out, dance, play with kids, ride motorcycles    Family History:  No family history on file.    Previous Labs, Allergy Tests, Dermatopathology, Imaging:    Previous TRUE Test: 3/1/23:   1+ reactions:   - Nickel sulfate  Paraben Mix  Thimersal   Allergens showing irritant reactions  2-bromo-2-nitropropene-1,3-diol    Referred By: No referring provider defined for this encounter.     Allergy Tests:    Past Allergy Test    Order for Future Allergy Testing:    [x] Outpatient  [] Inpatient: [facility]..../ Bed ....       Skin Atopy (atopic dermatitis) [x] Yes   [] No local derm says she has eczema  Contact allergies:   [x] Yes   [] No ..........   Hand eczema:   [x] Yes   [] No goldbond eczema helps, look crepey           Leading hand:   [] R   [] L       [] Ambidextrous         Drug allergies:        [x] Yes   [] No  Which? Extreme hives turned into SJS nitrofurantoin  Urticaria/Angioedema  [x] Yes   [] No .........  Food Allergy:  [] Yes   [x] No  which?......  Pets :  [x] Yes   [] No  Which?1 dog         [x]  Rhinitis   [x] Conjunctivitis   [x] Sinusitis   [] Polyposis   [x] Otitis (fluid/discharge behind ears)   [] Pharyngitis         [x]  Postnasal drip    []  none  Operations:   [] Tonsils   [] Septum   [] Sinus   [] Polyposis        [] Asthma bronchiale   [x] Coughing      []  none  Symptoms (mostly Rhinoconjunctivitis and Asthma) aggravated  by:  Season   [] I   [] II   [] III   [x] IV   [x]V   [x]VI   []VII   []VIII   []IX   []X   []XI   []XII     [x] perennial   Day time      [] morning   [] noon      [] evening        [x] night    [x] whole day........  []  none  Location/changes    [] inside        [] outside   [] mountains    [] sea     [] others.............   [x]  none  Triggers, specific     [] animals     [] plants     [] dust              [] others ...........................    [x]  none  Triggers, others       [] work          [] psyche    [] sport            [] others .............................  [x]  none  Irritant                [] phys efforts [] smoke    [] heat/cold     [] odors  [x]others. Air turn on . []  none    Order for PATCH TESTS  Reason for tests (suspected allergy): eczematous rash on trunk and extremities and scalp = ACD?  Known previous allergies: had true test in the past borderline pos to Nickel, Parabens. Thimerosal  Standardized panels  [x] Standard panel (40 tests)  [x] Preservatives & Antimicrobials (31 tests)  [x] Emulsifiers & Additives (25 tests)   [x] Perfumes/Flavours & Plants (25 tests)  [x] Hairdresser panel (12 tests)  [] Rubber Chemicals (22 tests)  [x] Plastics (26 tests)  [] Colorants/Dyes/Food additives (20 tests)  [] Metals (implants/dental) (24 tests)  [] Local anaesthetics/NSAIDs (13 tests)  [x] Antibiotics & Antimycotics (14 tests)   [] Corticosteroids (15 tests)   [] Photopatch test (62 tests)   [] others: ...      [x] Patient's own products: .spray tan  DO NOT test if chemical or biological identity is unknown!   always ask from patient the product information and safety sheets (MSDS)       Order for PRICK TESTS    Reason for tests (suspected allergy): atopy?  Known previous allergies: had IT at home and now tests negatives?    Standardized prick panels  [x] Atopic panel (20 tests)  [] Pediatric Panel (12 tests)  [] Milk, Meat, Eggs, Grains (20 tests)   [] Dust, Epithelia, Feathers (10 tests)  []  Fish, Seafood, Shellfish (17 tests)  [] Nuts, Beans (14 tests)  [] Spice, Vegetable, Fruit (17 tests)  [] Pollen Panel = Tree, Grass, Weed (24 tests)  [] Others: ...      [] Patient's own products: ...  DO NOT test if chemical or biological identity is unknown!   always ask from patient the product information and safety sheets (MSDS)     Standardized intradermal tests  [x] Penicillium notatum [x] Aspergillus fumigatus [x] House dust mites D.far & D. pteron  [] Cat    [] dog  [] Others: ...  [] Bee venom   [] Wasp venom  !!Specific protocol with dilutions!!       Order for Drug allergy tests (prick & Intradermal & patch tests)    [] Penicillin G  [] Ampicillin [x] Cefazolin   [x] Ceftriaxone   [] Ceftazidime  [x] Bactrim    [x] Others: Nitrofurantoin  Order for ... as test date    ________________________________  RESULTS & EVALUATION of PATCH TESTS    Aug 14, 2023 application of patch tests:    Patch test readings after     [x] 2 days, [] 3 days [x] 4 days, [] 5 days,  Other duration: ...    STANDARD Series                                          # Substance 2 days 4 days remarks     1 Francisco Mix [C] - -       2 Colophony - -       3  2-Mercaptobenzothiazole  - -       4 Methylisothiazolinone - -       5 Carba Mix - -       6 Thiuram Mix [A] - -       7 Bisphenol A Epoxy Resin - -       8 M-Hlsl-Ygvsrapzjvj-Formaldehyde Resin - -       9 Mercapto Mix [A] - -       10 Black Rubber Mix- PPD [B] - -       11 Potassium Dichromate  -  -       12 Balsam of [place] (Myroxylon Pereirae Resin) - -       13 Nickel Sulphate Hexahydrate - -       14 Mixed Dialkyl Thiourea - -       15 Paraben Mix [B] - -       16 Methyldibromo Glutaronitrile - -       17 Fragrance Mix 8% - -       18 2-Bromo-2-Nitropropane-1,3-Diol (Bronopol) CT - -       19 Lyral - -       20 Tixocortol-21- Pivalate CT - -       21 Diazolidinyl urea (Germall II) - -        22 Methyl Methacrylate - -       23 Cobalt (II) Chloride Hexahydrate - -       24  Fragrance Mix II  - -       25 Compositae Mix - -       26 Benzoyl Peroxide - -       27 Bacitracin - -       28 Formaldehyde - -       29 Methylchloroisothiazolinone / Methylisothiazolinone - -       30 Corticosteroid Mix CT - -       31 Sodium Lauryl Sulfate - -       32 Lanolin Alcohol - -       33 Turpentine - -       34 Cetylstearylalcohol - -       35 Chlorhexidine Dicluconate - -       36 Budesonide - -       37 Imidazolidinyl Urea  - -       38 Ethyl-2 Cyanoacrylate - -       39 Quaternium 15 (Dowicil 200) - -       40 Decyl Glucoside - -      PRESERVATIVES & ANTIMICROBIALS        # Substance 2 days 4 days remarks   41 1  1,2-Benzisothiazoline-3-One, Sodium Salt - -     2  1,3,5-Martinez (2-Hydroxyethyl) - Hexahydrotriazine (Grotan BK) - -     3 0-Zpverridkwnrm-8-Nitro-1, 3-Propanediol NA NA     4  3, 4, 4' - Triclocarban - -    45 5 4 - Chloro - 3 - Cresol - -     6 4 - Chloro - 4 - Xylenol (PCMX) - -     7 7-Ethylbicyclooxazolidine (Bioban TI1844) - -     8 Benzalkonium Chloride CT - -     9 Benzyl Alcohol - -    50 10 Cetalkonium Chloride - -     11 Cetylpyrimidine Chloride  - -     12 Chloroacetamide - -     13 DMDM Hydantoin - -     14 Glutaraldehyde - -    55 15 Triclosan - -     16 Glyoxal Trimeric Dihydrate - -     17 Iodopropynyl Butylcarbamate - -     18 Octylisothiazoline - -     19 Bithionol CT - -    60 20 Bioban P 1487 (Nitrobutyl) Morpholine/(Ethylnitro-Trimethylene) Dimorpholine - -     21 Phenoxyethanol - -     22 Phenyl Salicylate - -     23 Povidone Iodine - -     24 Sodium Benzoate - -    65 25 Sodium Disulfite - -     26 Sorbic Acid - -     27 Thimerosal - -     28 Melamine Formaldehyde Resin - -     29 Ethylenediamine Dihydrochloride - -      Parabens      70 30 Butyl-P-Hydroxybenzoate - -     31 Ethyl-P-Hydroxybenzoate - -     32 Methyl-P-Hydroxybenzoate - -    73 33 Propyl-P-Hydroxybenzoate - -     EMULSIFIERS & ADDITIVES       # Substance 2 days 4 days remarks   74 1 Polyethylene  Glycol-400 - -    75 2 Cocamidopropyl Betaine - -     3 Amerchol L101 - -     4 Propylene Glycol - -     5 Triethanolamine - -     6 Sorbitane Sesquiolate CT - -    80 7 Isopropylmyristate - -     8 Polysorbate 80 CT - -     9 Amidoamine   (Stearamidopropyl Dimethylamine) - -     10 Oleamidopropyl Dimethylamine - -     11 Lauryl Glucoside - -    85 12 Coconut Diethanolamide  - -     13 2-Hydroxy-4-Methoxy Benzophenone (Oxybenzone) - -     14 Benzophenone-4 (Sulisobenzon) - -     15 Propolis - -     16 Dexpanthenol - -    90 17 Abitol - -     18 Tert-Butylhydroquinone - -     19 Benzyl Salicylate - -     20 Dimethylaminopropylamin (DMPA) - -     21 Zinc Pyrithione (Zinc Omadine)  - -    95 22 Martinez(Hydroxymethyl) Nitromethane  - -      Antioxidant       23 Dodecyl Gallate - -     24 Butylhydroxyanisole (BHA) - -     25 Butylhydroxytoluene (BHT) - -     26 Di-Alpha-Tocopherol (Vit E) - -    100 27 Propyl Gallate - -     PERFUMES, FLAVORS & PLANTS        # Substance 2 days 4 days remarks   101 1 Benzyl Cinnamate - -     2 Di-Limonene (Dipentene) - -     3 Cananga Odorata (Aldo Carlson) (I) - -     4 Lichen Acid Mix - -    105 5 Mentha Piperita Oil (Peppermint Oil) - -     6 Sesquiterpenelactone mix - -     7 Tea Tree Oil, Oxidized - -     8 Wood Tar Mix - -     9 Abietic Acid - -    110 10 Lavendula Angustifolia Oil (Lavender Oil) - -     11 Fragrance mix II CT * 14% - -      Fragrance Mix I       12 Oakmoss Absolute - -     13 Eugenol - -     14 Geraniol - -    115 15 Hydroxycitronellal - -     16 Isoeugenol - -     17 Cinnamic Aldehyde - -     18 Cinnamic Alcohol  - -      Fragrance mix II       19 Citronellol - -    120 20 Alpha-Hexylcinnamic Aldehyde    - -     21 Citral - -     22 Farnesol - -    123 23 Coumarin - -    Hexylcinnamic aldehyde, Coumarin, Farnesol, Hydroxyisohexy3-cyclohexene carboxaldehyde, citral, citrolellol  HAIRDRESSER        # Substance 2 days 4 days remarks   124 1 P-Phenylenediamine -  +    125  2 P-Toluenediamine Sulphate  -  -     3 Ammonium Thioglycolate - -     4 Ammonium Persulfate - -     5 Resorcinol - -     6 3-Aminophenol - -    130 7 P-Aminophenol - +     8 Glyceryl Monothioglycolate - -    132 9 Pyrogallol - -     PLASTICS (Acrylates/Epoxy Systems)       # Substance 2 days 4 days remarks     Acrylates - -    133 1 2-Hydroxyethyl Methacrylate (HEMA) - -     2 1,4-Butandioldimethacrylate (BUDMA) - -    135 3  2-Ethylhexyl Acrylate - -     4 Bisphenol-A-Dimethacrylate  - -     5 Diurethane-Dimethacrylate - -     6 Ethyleneglycoldimethacrylate (EGDMA) - -     7 Pentaerythritoltriacrylate (KEITH) - -    140 8 Triethylene Glycol Dimethacrylate (TEGDMA) - -      Synthetic material/additives        9 C-Qtfk-Shnctybhqnc - -     10 Tricresyl Phosphate - -     11 9-Nqgy-Atzegxbtpwxqs - -     12 Dioctyl phtalate(DEHP,DOP) / (Dimethylhexylphthalate)  - -    145 13 Dibutylphthalate - -     14 Dimethylphthalate - -     15 Toluene-2,4-Diisocyanate NA NA     16 Diphenylmethane-4,4''-Diisocyanate NA NA      EPOXY RESIN SYSTEMS        Reactive Solvents - -     17 Cresyl Glycidyl Ether NA NA    150 18 Butyl Glycidyl Ether - -     19 Phenyl Glycidyl Ether - -     20 1,4-Butanediol Diglycidyl Ether - -     21 1,6-Hexanediole Diglycidyl Ether - -      Hardener / Accelerator - -     22 Triethylenetetramine - -    155 23 Diethylenetriamine - -     24 Isophorone Diamine (IPD) - -     25 N,N-Dimethyl-P-Toluidine - -    158 26 Isobornyl Acrylate - -     ANTIBIOTICS & ANTIMYCOTICS    # Substance 2 days 4 days remarks   159 1 Erythromycin - -    160 2 Framycetin Sulfate - -     3 Fusidic Acid Sodium Salt - -     4 Gentamicin Sulfate - -     5 Neomycin Sulfate - -     6 Oxytetracycline  - -    165 7 Polymyxin B Sulfate - -     8 Tetracycline-HCL - -     9 Sulfanilamide - -     10 Metronidazole - -     11 Nitrofurazone - -    170 12 Nystatin - -     13 Clotrimazole - -     14 Clioquinol 5% - -     15 Miconazole  - -    174 16  Tobramycine        OTHER PRODUCTS        # Substance Conc  % solv 2 days 4 days remarks   175 1 [place] spray tan   - -    176 2 Lesia dry shampoo   - -        Results of patch tests:                         Interpretation:  - Negative                    A    = Allergic      (+) Erythema    TI   = Toxic/irritant   + E + Infiltration    RaP = Relevance at Present     ++ E/I + Papulovesicle   Rpr  = Relevance Previously     +++ E/I/P + Blister     nR   = No Relevance      DRUG ALLERGY TEST SERIES Aug 14, 2023    ANTIBIOTICS    Prick Tests         Substance/ Allergen Conc Result (20 min) Remarks   1 Cefazolin[1] 100 mg/ml -    2 Ceftriaxone* [2] 100 mg/ml -    3 Bactrim 80/400 mg/ml -    4 Nitrofurantoin powder 100 mg -       Intradermal Tests   immed immed delay delay      Substance Conc 1st dil  2nd dil  2 days  4 days remarks   1 Cefazolin[1] 1:5 -   -    2 Ceftriaxone* [2] 1:5 -   -    3 Bactrim 1:100 -   -        Patch Tests  as is as is 1:2 1:2     As Is  Vas Substance Conc 2 days 4 days 2 days 4 days remarks   1 2 Cefazolin[1] 100 mg/ml - - - -    3 4 Ceftriaxone* [2] 100 mg/ml - -  -    5 6 Bactrim 80/400 mg/ml - - (+) -    7 8 Nitrofurantoin powder 100 mg - - - -    [1]  Cefalexin/Cefazolin-group        [2]    Cefotaxim-group     [3]     Cefuroxim-group      Atopy Screen (Placed Aug 14, 2023)  No Substance Readings (15 min) Evaluation   POS Histamine 1mg/ml ++    NEG NaCl 0.9% - dermographism     No Substance Readings (15 min) Evaluation   1 Alternaria alternata (tenuis)  -    2 Cladosporium herbarum -    3 Aspergillus fumigatus -    4 Penicillium notatum -    5 Dermatophagoides pteronyssinus -    6 Dermatophagoides farinae -    7 Dog epithelium (canis spp) -    8 Cat hair (jony catus) -    9 Cockroach   (Blatella americana & germanica) -    10 Grass mix midwest   (Parris, Orchard, Redtop, Waqas) -    11 Gerardo grass (sorghum halepense) -    12 Weed mix   (common Cocklebur, Lamb s quarters, rough  redroot Pigweed, Dock/Sorrel) -    13 Mug wort (artemisia vulgare) -    14 Ragweed giant/short (ambrosia spp) -    15 White birch (Betula papyrifera) -    16 Tree mix 1 (Pecan, Maple BHR, Oak RVW, american [place], black [place]vale) -    17 Red cedar (juniperus virginia) -    18 Tree mix 2   (white Alvin, river/red Birch, black Perry, common [place], american Elm) -    19 Box elder/Maple mix (acer spp) -    20 [place]field shagbark (carya ovata) -           Conclusion: no clear signs for positive reaction in atopy screen prick tests      Intradermal Testing (Placed Aug 14, 2023)  No Substance Conc.  Reading (15min)  immediate Papule [mm] / Erythema [mm] Reading   (4 days)  delayed Papule [mm] / Erythema [mm] Remarks   DF Standard Dust Mite - D. Farinae 1:10 -  + 5/5    DP Standard Dust Mite - D. Pteronyssinus 1:10 -  (+) 4/4    A Aspergillus fumigatus  1:10 -   -    P Penicillium notatum 1:10 -   -    Conclusions: no immediate type reaction. Some delayed type reactions to dust mites in intradermal tests    [] No relevant allergic reaction observed    [x] Allergic reaction diagnosed against following allergens:    Hair dye: + paraphenylene diamine and + p-aminophenole      Interpretation/ remarks:   Certainly some contact allergy to hair dye and as well atopic dermatitis    [] Patient information given   [] ACDS CAMP information's (# [id number], and [id number]) to following compounds: .....   [] General information's to following compounds: ......      Assessment & Plan:    ==> Final Diagnosis:     # Atopic predisposition with  Seasonal RC and Rhinosinusitis in spring  Perennial RC and Sinusitis (more at night) = delayed type reaction to dust mites  Family hx of Asthma  St after IT at home (what injected?)  * chronic illness with exacerbation, progression, side effects from treatment    # physical Urticaria triggered by heat and stress  Some improvement on Xolair  * chronic illness with exacerbation, progression, side  effects from treatment    # recurrent pruritic, papular dermatitis on trunk and extremities  > Partially allergic contact dermatitis to hair dye  > partially atopic dermatitis  DDx M. Pierson Darier type  * chronic illness with exacerbation, progression, side effects from treatment     # hairloss with scalp irritation = allergic contact dermatitis?  * chronic illness with exacerbation, progression, side effects from treatment    # multiple drug allergies  Nitrofurantoin = hives and then turning into black erosions? No mucosal involvement = skin tests negatives, but patient had strong clinical reaction (more delayed type) ==> for safety reasons still avoid  Sulfonamides = hives ==> skin tests neg  Cephalexin = hives ==> skin tests neg  * chronic illness with exacerbation, progression, side effects from treatment      These conclusions are made at the best of one's knowledge and belief based on the provided evidence such as patient's history and allergy test results and they can change over time or can be incomplete because of missing information's.    ==> Treatment Plan:    >> follow the recommendations of CAMP Shahbaz and try to use products for hair dye and toning shampoos only from the shahbaz    >> info given HOUSE DUST MITES reduction and maybe if in winter again sinus problems, try to use Nasonex nasal spray (antihistamines might not help with delayed reaction)    >> in prick, intradermal and patch tests no signs for specific immediate or delayed type reactions to .Cephaloporines (Cefazolin and Ceftriaxone) and Sulfonamide Bactrim   ==> if patient needs these medications, they can be used. Keep patient 30min under observation in clinic for first, initial dose and give before and during antibiotic treatment high dose of antihistamines (e.g. Allegra 180mg 2xdaily)  >> because reaction to Nitrofurantoin was recent and more delayed type reaction I would recommend to avoid it despite negative skin tests      >> after bee  sting? as child strong local reaction ==> maybe today spec IgE to bee and wasp    ___________________________      Staff: : provider    Follow-up in Derm-Allergy clinic if necessary ==> sees today Dr. Carbone  ___________________________    I spent a total of 35 minutes with Va Riddle during today s  visit. This time was spent discussing all the individual test results, correlating them to the clinical relevance, counseling the patient and/or coordinating care. Moreover time was spent to install and explain the CAMP Shahbaz from [employer] with the informations on the individual allergens and propositions of products that can be used. Please see Assessment and Plan for additional details.            Again, thank you for allowing me to participate in the care of your patient.        Sincerely,        Jose Amezcua MD

## 2023-08-18 NOTE — PATIENT INSTRUCTIONS
[] ACDS CAMP information's (# [id number], and [id number]) to following compounds: .....  + paraphenylene diamine and + p-aminophenole       House Dust Mite Allergy        The house dust mite is an arachnid about 0.3 mm in size and not visible to the naked eye. There are around 150 species of house dust mites in the world. One mite produces up to 40 fecal droppings a day. One teaspoonful of bedroom dust contains an average of nearly 1000 mites and 250,000 minute droppings.    Causes and triggers of house dust mite allergy  The house dust mite requires a warm, moist environment without light in order to live and reproduce. Our beds are ideal. The mite feeds on human and animal skin scales. The allergen is mainly contained in the mite's feces. The feces contain allergy-triggering constituents which are spread in fine dust, are breathed in and can cause an allergic reaction.    Symptoms  When the allergens come into contact with the mucous membranes in the eyes, nose, mouth and throat, sufferers develop symptoms typical of an allergic cold (allergic rhinitis) or an allergic inflammation of the conjunctiva (allergic conjunctivitis): blocked or runny nose, sneezing, red, itchy eyes. If all of these symptoms are present, then the condition is also known as rhinoconjunctivitis. Often, the upper respiratory tract becomes chronically inflamed, primarily because house dust mites are present all year round.  The symptoms of house dust mite allergy typically occur in the morning and are more frequent in the cold months of the year.    Therapy and treatment  As a first step, mattress, pillows and duvet/comforter should be placed in mite-proof or anti-mite covers, sometimes known as encasings. Alternatively you can use pillows or comforter that can be washed at over 130 F monthly. At the same time, house dust should be minimized. If necessary, the symptoms can be treated with medication, for example antihistamines in the form of  "nasal sprays, eye drops and tablets. Desensitization/specific immunotherapy (SIT) is recommended for house dust allergy if all the measures above are not sufficient.    Tips and tricks:  Keep room temperature at 66-70 F and relative air humidity at a maximum of 50%.  Ideally, thoroughly air your home two to three times a day for 5 to 10 minutes each time.  Wash bed linens in at least 130 F every week.  Remove stuffed animals or freeze them every other week.  Keep ceiling fans off in the bedroom as they can stir up dust mite allergens.  Remove dust from furniture with a damp cloth and regularly wet mop floors.  Do not put pot and hydroponic plants in the bedroom and also avoid putting too many in living areas, as they increase room humidity.  When staying overnight in other accommodations, we recommend taking your own bed linen and the above anti-mite mattress covers with you.  Remove upholstered furniture from the bedroom and consider removing the carpets. Ideally, use sealed parquet or laminate helen, cork tiles or helen made of wood, novilon or PVC.  Maybe additionally reduce dust mites in mattress, upholstery, or helen using hot steam .      Modified from \"House Dust Mite Allergy\" by [employer]! [employer] [place].      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 It is important  that you familiarize yourself with this chemical and take steps to avoid coming in contact with it.  i What is 4-Phenylenediamine base and where is it found?  This chemical is used in the manufacture of rubber, as a reagent in hair dyes, lithography, photocopying, oils, greases,  gasoline, rubbers and plastics. Further research may identify additional product or industrial usages of this chemical.  i What else is 4-Phenylenediamine base called?  This chemical can be identified by different names, " includin,4-Diaminobenzene  1,4-Benzenediamine  1,4-Phenylenediamine  4-Aminoaniline  benzofur d  C.I. 57924  C.I. developer 13  C.I. oxidation base 10  developer 13  durafur black  Developer 12  Developer PF  fouramine d  fourrine d;  fourrine i  fur black r  fur brown 58828  furro d  fur yellow  Futramine D  Fur Black 10066  Johnny h  Orsin  oxidation base 10  pelagol dr  pelagol sheets d  Pelagol D  PPD  Peltol D  Phenyhydrazine  p-Amimoaniline  p-benzenediamine  p-Diaminobenzene  p-Phenylene diamine  Phenylenediamine base  Renal PF  Rodol D  Santoflex ic  Tertral D  Ursol D  This may not be a complete list as manufacturers introduce and delete chemicals from their product lines.     done done

## 2023-08-18 NOTE — PROGRESS NOTES
[facility] Dermatology Note  Encounter Date: Aug 18, 2023  Office Visit     Dermatology Problem List:  1. Hair Loss, outside bx on 5/16/22 at  dermatology showed Androgenetic Alopecia vs Dermal hypersensitivity   - Repeat bx 3/13/2023 showed sparse perivascular inflammation  - Hair Metrix 3/13/2023, 8/18/23  - Epidermal nerve biopsy 3/13/2023: no evidence of neuropathy  - Previous tx: ILK 7/10/23, topical spironolactone  - Current tx: minoxidil 1.25 g PO daily  2. Allergic contact dermatitis  - Patch testing with Dr. 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 H/o BCC on bx 3/13/23  - Monitor  ____________________________________________    Assessment & Plan:   #  Non-scarring alopecia   Outside biopsy uploaded to media tab. Outside bx c/w androgenetic alopecia vs dermal hypersensitivity  - HairMetrix today (see procedure note) with overall scalp health improvement  - Patch testing with Dr. Amezcua showed positive delayed reaction for for P-Phenylenediamine and P-Aminophenol (substances found in hair products and dyes)--> Follow [employer] jamison recommendations for hair products  - Pruritic scalp --> rx topical gabapentin nightly  - Continue iron supplementation as this helps with scalp pruritus   - Continue minoxidil 1.25 mg PO daily  - Labs today: CBC, CMP, DHEA-sulfate, iron studies and testosterone  - Future: Interdisciplinary discussion about going back on an oral contraceptives  - Recommend talking to PCP about switching from atenolol to a different anti-hypertensive     #Allergic contact dermatitis  - See above  - Seen for patch testing this week with Dr. Amezcua; house dust mites, P-Phenylenediamine and P-Aminophenol   - Follow CAMP jamison recommendations    # H/o BCC bx 4/18/23  - Punch biopsy today from abdomen. Patient with history of Darier's disease lesion incidentally found to be BCC, would like another  biopsy for evaluation and assurance.  - Recommend regular full body skin exams with your local dermatologist  - Sun protection: Counseled SPF30+ sunscreen, UPF clothing, sun avoidance, tanning bed avoidance.    # Darier's disease  - Left flank and upper left back bx 4/11/23: acantholytic dyskeratosis consistent with Darier's disease  - Resume tazorac treatment with local dermatologist    Procedures Performed:   - Punch biopsy procedure note, location(s): central supraumbilical abdomen. After discussion of benefits and risks including but not limited to bleeding, infection, scar, incomplete removal, recurrence, and non-diagnostic biopsy, written consent and photographs were obtained. The area was cleaned with isopropyl alcohol. 0.5mL of 1% lidocaine with epinephrine was injected to obtain adequate anesthesia and a 3 mm punch biopsy was performed at site(s). 4-0 Prolene sutures were utilized to approximate the epidermal edges. White petrolatum ointment and a bandage was applied to the wound. Explicit verbal and written wound care instructions were provided. The patient left the dermatology clinic in good condition.     Hair Metrix (3/13/23 --> 8/18/23)  F: 263 --> 230  M: 311 --> 292  V: 266 --> 276  O: 217--> 213  RT: 191 --> 185  LT: 174 --> 149    Follow-up: 3 month(s) virtually (telephone with photos), or earlier for new or changing lesions, follow-up in person in 6 months    Staff and Medical Student:   Annetta Garrison, MS3, seen and staffed with Dr. Carbone    Staff Physician:  I was present with the medical student who participated in the service and in the documentation of the note. I have verified the history and personally performed the physical exam and medical decision making. I agree with the assessment and plan of care as documented in the note. Biopsy was done together.           Marlys Carbone MD  Professor and Chair  Department of Dermatology  [facility]  [facility]: Phone: [phone], Fax:[phone]  [facility]: Phone: [phone], Fax: [phone]  9/4/2023    ____________________________________________    CC: Hair Loss (Va is here for a HL follow-up. States condition has worsened since last seen. )    HPI:  Ms. Va Riddle is a 43 year old female who presents as a return patient for follow-up of hair loss, diagnosed as alopecia vs dermal hypersensitivity.     Va feels that her hair loss is worsening. Extensions at crown of head may be contributing. Notes chunks of hair that are really short, questions if they are broken off.   - Last seen in-clinic on 4/21/23  - Shedding or thinning, or both: Both  - Current tx: 1.25 g minoxidil PO  - Scalp or hair care habits/products: Awaken shampoo and conditioner alternating with Moeri shampoo and conditioner (shampoos daily), Big Fat Hair dry shampoo, Amika smoothing hair balm and thickening cream. Colors hair every month with Chevya Marycruzstin    - ILK injection with dermatology elsewhere 7/10/2; helped with scalp irritation caused some divots, no improvement in hair loss.    - Of note, patient is on atenolol for high blood pressure.    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 Hopes to resuse medication again pending Dr. Carbone's approval.    Of note, stopped estrogen/progesterone birth control due to high blood pressure about a year ago and started losing a lot of hair at that time. Spoke with OBGYN and they Ok'd her to resume, curious about recommendations from derm point of view.    ILK 7/10/23, tazorac allover, started taking iron around periods 3-4 months Any new medications, supplements, or products? (please list below)     + tender to touch Scalp pain   No  Scalp burning   + Scalp itching    + thinning Eyebrow changes    No Eyelash changes   No Beard changes    + fine body hairs from minoxidil Other body hair changes    No Nail changes    Starting around her periods (q 2-3 wks), has all over tremors all day, loses a lot of hair, feels weak and tired. Sx stop when periods stops, except tremors can occur outside of periods too. Additional symptoms? (please list below)     - Overall course: worse; feels she is losing more hair  - COVID status: yes, November 2020     Patient is otherwise feeling well, in usual state of health, and has no additional skin concerns today.     ROS: As per HPI    Labs:  None reviewed.    Physical Exam:  Vitals: /80   GEN: Well developed, well-nourished, in no acute distress, in a pleasant mood.    SKIN: Focused examination of hair, scalp, and trunk was performed.  - Slater type: I-II  - HairMetrix today (see procedure note)  - New finhair growth at temporal scalp  - Diffuse erythema with some loose scaling  - normal eyebrow density  - Unable to examine eyelash density due to cosmetic lashes  - In comparison to prior photographs, scalp health improved  - Scattered pink 1-3 mm morbiliform non-blanching papules on the trunk  - No other lesions of concern on areas examined.       Medications:  Current Outpatient Medications   Medication    amphetamine-dextroamphetamine (ADDERALL) 20 MG tablet    atenolol (TENORMIN) 25 MG tablet    clobetasol (TEMOVATE) 0.05 % external solution    ketoconazole (NIZORAL) 2 % external shampoo    linaclotide (LINZESS) 290 MCG capsule    MINOXIDIL FOR WOMEN EX    sucralfate (CARAFATE) 1 GM tablet    UBRELVY 100 MG tablet    XOLAIR 150 MG/ML SOSY injection    betamethasone dipropionate (DIPROSONE) 0.05 % external cream     Current Facility-Administered Medications   Medication    nitroFURantoin macrocrystal-monohydrate (MACROBID) capsule 100 mg      Past Medical History:   There is no problem list on file for  this patient.    No past medical history on file.    CC No referring provider defined for this encounter. on close of this encounter.

## 2023-08-18 NOTE — NURSING NOTE
Dermatology Rooming Note    Va Riddle's goals for this visit include:   Chief Complaint   Patient presents with    Hair Loss     Va is here for a HL follow-up. States condition has worsened since last seen.      Jose M Ramos, EMT

## 2023-08-18 NOTE — NURSING NOTE
Chief Complaint   Patient presents with    Allergy Recheck     Patch day 5     Syeda BAILEY RN-BSN  Dermatology Surgery  [phone]

## 2023-08-18 NOTE — NURSING NOTE
Lidocaine-epinephrine 1-1:334132 % injection   1mL once for one use, starting 8/18/2023 ending 8/18/2023,  2mL disp, R-0, injection  Injected by Annetta Garrison

## 2023-08-18 NOTE — LETTER
8/18/2023       RE: Va Riddle  [address] [address]     Dear Colleague,    Thank you for referring your patient, Va Riddle, to the [facility] [place] at [facility]. Please see a copy of my visit note below.    [facility] Dermatology Note  Encounter Date: Aug 18, 2023  Office Visit     Dermatology Problem List:  1. Hair Loss, outside bx on 5/16/22 at  dermatology showed Androgenetic Alopecia vs Dermal hypersensitivity   - Repeat bx 3/13/2023 showed sparse perivascular inflammation  - Hair Metrix 3/13/2023, 8/18/23  - Epidermal nerve biopsy 3/13/2023: no evidence of neuropathy  - Previous tx: ILK 7/10/23, topical spironolactone  - Current tx: minoxidil 1.25 g PO daily  2. Allergic contact dermatitis  - Patch testing with Dr. 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 H/o BCC on bx 3/13/23  - Monitor  ____________________________________________    Assessment & Plan:   #  Non-scarring alopecia   Outside biopsy uploaded to media tab. Outside bx c/w androgenetic alopecia vs dermal hypersensitivity  - HairMetrix today (see procedure note) with overall scalp health improvement  - Patch testing with Dr. Amezcua showed positive delayed reaction for for P-Phenylenediamine and P-Aminophenol (substances found in hair products and dyes)--> Follow CAMP jamison recommendations for hair products  - Pruritic scalp --> rx topical gabapentin nightly  - Continue iron supplementation as this helps with scalp pruritus   - Continue minoxidil 1.25 mg PO daily  - Labs today: CBC, CMP, DHEA-sulfate, iron studies and testosterone  - Future: Interdisciplinary discussion about going back on an oral contraceptives  - Recommend talking to PCP about switching from atenolol to a different anti-hypertensive     #Allergic  contact dermatitis  - See above  - Seen for patch testing this week with Dr. Amezcua; house dust mites, P-Phenylenediamine and P-Aminophenol   - Follow CAMP jamison recommendations    # H/o BCC bx 4/18/23  - Punch biopsy today from abdomen. Patient with history of Darier's disease lesion incidentally found to be BCC, would like another biopsy for evaluation and assurance.  - Recommend regular full body skin exams with your local dermatologist  - Sun protection: Counseled SPF30+ sunscreen, UPF clothing, sun avoidance, tanning bed avoidance.    # Darier's disease  - Left flank and upper left back bx 4/11/23: acantholytic dyskeratosis consistent with Darier's disease  - Resume tazorac treatment with local dermatologist    Procedures Performed:   - Punch biopsy procedure note, location(s): central supraumbilical abdomen. After discussion of benefits and risks including but not limited to bleeding, infection, scar, incomplete removal, recurrence, and non-diagnostic biopsy, written consent and photographs were obtained. The area was cleaned with isopropyl alcohol. 0.5mL of 1% lidocaine with epinephrine was injected to obtain adequate anesthesia and a 3 mm punch biopsy was performed at site(s). 4-0 Prolene sutures were utilized to approximate the epidermal edges. White petrolatum ointment and a bandage was applied to the wound. Explicit verbal and written wound care instructions were provided. The patient left the dermatology clinic in good condition.     Hair Metrix (3/13/23 --> 8/18/23)  F: 263 --> 230  M: 311 --> 292  V: 266 --> 276  O: 217--> 213  RT: 191 --> 185  LT: 174 --> 149    Follow-up: 3 month(s) virtually (telephone with photos), or earlier for new or changing lesions, follow-up in person in 6 months    Staff and Medical Student:   Annetta Garrison, MS3, seen and staffed with Dr. Carbone    Staff Physician:  I was present with the medical student who participated in the service and in the documentation of the  note. I have verified the history and personally performed the physical exam and medical decision making. I agree with the assessment and plan of care as documented in the note. Biopsy was done together.           Marlys Carbone MD  Professor and Chair  Department of Dermatology  [facility]: Phone: [phone], Fax:[phone]  [facility]: Phone: [phone], Fax: [phone]  9/4/2023    ____________________________________________    CC: Hair Loss (Va is here for a HL follow-up. States condition has worsened since last seen. )    HPI:  Ms. Va Riddle is a 43 year old female who presents as a return patient for follow-up of hair loss, diagnosed as alopecia vs dermal hypersensitivity.     Va feels that her hair loss is worsening. Extensions at crown of head may be contributing. Notes chunks of hair that are really short, questions if they are broken off.   - Last seen in-clinic on 4/21/23  - Shedding or thinning, or both: Both  - Current tx: 1.25 g minoxidil PO  - Scalp or hair care habits/products: Awaken shampoo and conditioner alternating with Moeri shampoo and conditioner (shampoos daily), Big Fat Hair dry shampoo, Amika smoothing hair balm and thickening cream. Colors hair every month with Khalida Chand    - ILK injection with dermatology elsewhere 7/10/2; helped with scalp irritation caused some divots, no improvement in hair loss.    - Of note, patient is on atenolol for high blood pressure.    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 Hopes to resuse medication again pending Dr. Carbone's approval.    Of note, stopped estrogen/progesterone birth control due to high blood pressure about a year  ago and started losing a lot of hair at that time. Spoke with OBGYN and they Ok'd her to resume, curious about recommendations from derm point of view.    ILK 7/10/23, tazmick fritzver, started taking iron around periods 3-4 months Any new medications, supplements, or products? (please list below)     + tender to touch Scalp pain   No Scalp burning   + Scalp itching    + thinning Eyebrow changes    No Eyelash changes   No Beard changes    + fine body hairs from minoxidil Other body hair changes    No Nail changes    Starting around her periods (q 2-3 wks), has all over tremors all day, loses a lot of hair, feels weak and tired. Sx stop when periods stops, except tremors can occur outside of periods too. Additional symptoms? (please list below)     - Overall course: worse; feels she is losing more hair  - COVID status: yes, November 2020     Patient is otherwise feeling well, in usual state of health, and has no additional skin concerns today.     ROS: As per HPI    Labs:  None reviewed.    Physical Exam:  Vitals: /80   GEN: Well developed, well-nourished, in no acute distress, in a pleasant mood.    SKIN: Focused examination of hair, scalp, and trunk was performed.  - Slater type: I-II  - HairMetrix today (see procedure note)  - New finhair growth at temporal scalp  - Diffuse erythema with some loose scaling  - normal eyebrow density  - Unable to examine eyelash density due to cosmetic lashes  - In comparison to prior photographs, scalp health improved  - Scattered pink 1-3 mm morbiliform non-blanching papules on the trunk  - No other lesions of concern on areas examined.       Medications:  Current Outpatient Medications   Medication     amphetamine-dextroamphetamine (ADDERALL) 20 MG tablet     atenolol (TENORMIN) 25 MG tablet     clobetasol (TEMOVATE) 0.05 % external solution     ketoconazole (NIZORAL) 2 % external shampoo     linaclotide (LINZESS) 290 MCG capsule     MINOXIDIL FOR WOMEN EX      sucralfate (CARAFATE) 1 GM tablet     UBRELVY 100 MG tablet     XOLAIR 150 MG/ML SOSY injection     betamethasone dipropionate (DIPROSONE) 0.05 % external cream     Current Facility-Administered Medications   Medication     nitroFURantoin macrocrystal-monohydrate (MACROBID) capsule 100 mg      Past Medical History:   There is no problem list on file for this patient.    No past medical history on file.    CC No referring provider defined for this encounter. on close of this encounter.     Global Photography Summary (8/18/2023):    Frontal    Superior    Vertex    Right temporal    Left temporal       HairMetrix Summary (8/18/2023)    Frontal anterior (7.75 cm)    Mid scalp (12 cm)    Vertex (24 cm)    Occipital (30 cm)    Right temporal (8.5, 9.5 cm)    Left temporal (8, 8 cm)    Summary           Again, thank you for allowing me to participate in the care of your patient.      Sincerely,    Marlys Carbone MD

## 2023-08-19 LAB — SHBG SERPL-SCNC: 98 NMOL/L (ref 30–135)

## 2023-08-19 NOTE — PROGRESS NOTES
HairMetrix Summary (8/18/2023)    Frontal anterior (7.75 cm)    Mid scalp (12 cm)    Vertex (24 cm)    Occipital (30 cm)    Right temporal (8.5, 9.5 cm)    Left temporal (8, 8 cm)    Summary

## 2023-08-19 NOTE — PROGRESS NOTES
Global Photography Summary (8/18/2023):    Frontal    Superior    Vertex    Right temporal    Left temporal

## 2023-08-21 LAB
DHEA-S SERPL-MCNC: 41 UG/DL (ref 35–430)
HONEY BEE IGE QN: <0.1 KU(A)/L
YELLOW JACKET IGE QN: <0.1 KU(A)/L

## 2023-08-22 LAB
TESTOST FREE SERPL-MCNC: 0.07 NG/DL
TESTOST SERPL-MCNC: 8 NG/DL (ref 8–60)

## 2023-08-28 ENCOUNTER — MYC MEDICAL ADVICE (OUTPATIENT)
Dept: DERMATOLOGY | Facility: CLINIC | Age: 43
End: 2023-08-28
Payer: COMMERCIAL

## 2023-08-31 ENCOUNTER — TELEPHONE (OUTPATIENT)
Dept: DERMATOLOGY | Facility: CLINIC | Age: 43
End: 2023-08-31
Payer: COMMERCIAL

## 2023-08-31 NOTE — TELEPHONE ENCOUNTER
[facility] Call Center    Phone Message    May a detailed message be left on voicemail: yes     Reason for Call: Other: Pt is calling to respond to the [employer] message offering a visit on 12/19 at 3pm. Pt would like to accept that visit. Please call Pt back to confirm or confirm in [employer]. Thank you.      Action Taken: Message routed to:  [facility] ([facility] Derm    Travel Screening: Not Applicable

## 2024-02-20 ENCOUNTER — OFFICE VISIT (OUTPATIENT)
Dept: DERMATOLOGY | Facility: CLINIC | Age: 44
End: 2024-02-20
Payer: COMMERCIAL

## 2024-02-20 VITALS — HEART RATE: 99 BPM | SYSTOLIC BLOOD PRESSURE: 127 MMHG | DIASTOLIC BLOOD PRESSURE: 91 MMHG

## 2024-02-20 DIAGNOSIS — L23.5 ALLERGIC DERMATITIS DUE TO OTHER CHEMICAL PRODUCT: ICD-10-CM

## 2024-02-20 DIAGNOSIS — L65.9 LOSS OF HAIR: Primary | ICD-10-CM

## 2024-02-20 DIAGNOSIS — R20.8 DYSESTHESIA OF SCALP: ICD-10-CM

## 2024-02-20 PROCEDURE — 11901 INJECT SKIN LESIONS >7: CPT | Mod: GC | Performed by: DERMATOLOGY

## 2024-02-20 PROCEDURE — 99213 OFFICE O/P EST LOW 20 MIN: CPT | Mod: 25 | Performed by: DERMATOLOGY

## 2024-02-20 ASSESSMENT — PAIN SCALES - GENERAL: PAINLEVEL: MILD PAIN (3)

## 2024-02-20 NOTE — NURSING NOTE
Drug Administration Record    Prior to injection, verified patient identity using patient's name and date of birth.  Due to injection administration, patient instructed to remain in clinic for 15 minutes  afterwards, and to report any adverse reaction to me immediately.    Drug Name: triamcinolone acetonide(kenalog)  Dose: 2mL of triamcinolone 10mg/mL, 20mg dose  Route administered: ID  NDC #: 6146-8299-11  Amount of waste(mL):3mL  Reason for waste: Multi dose vial    LOT #: [id number]  SITE: See provider's notes  : [employer]  EXPIRATION DATE: 12/31/2025

## 2024-02-20 NOTE — PROGRESS NOTES
[facility] Dermatology Note  Encounter Date: Feb 20, 2024  Office Visit     Dermatology Problem List:  1. Hair Loss, outside bx on 5/16/22 at  dermatology showed androgenetic alopecia vs dermal hypersensitivity   - Repeat bx 3/13/2023 showed sparse perivascular inflammation  - Hair Metrix 3/13/2023, 8/18/23  - Epidermal nerve biopsy 3/13/2023: no evidence of neuropathy  - Previous tx: ILK 7/10/23, topical spironolactone  - Current tx: minoxidil 2.5 mg PO daily (increased 2/20/24), clobetasol solution, allergen avoidance  - Future: dupixent (PA process started 2/20/24)  2. Allergic contact dermatitis  - Patch testing with Dr. 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 H/o BCC on bx 3/13/23  - Monitor  ____________________________________________    Assessment & Plan:   #  Non-scarring alopecia with ACD and significant scalp symptoms (tingling)  She feels like her hair loss and scalp symptoms are worsening since the last visit. We suspect this is in part secondary to a probable exposure to PPD especially given pinkness to the scalp on exam today. She did not get topical gabapetin after the last visit, so will start this today to attempt to control symptoms as well. She was noted to have an atopic predisposition and does have a history of random eczematous rashes on the extremities, so we discussed Dupixent today as a next step if topicals do not work. She has had significant hair regrowth with PO minoxidil which has plateaued so will increase this as well today. Interestingly, her symptoms flare the worst with menstrual periods. OCPs were previously stopped due to HTN.  - Adhere to CAMP list  - ILK (see procedure note)  - Start topical gabapentin 6% solution nightly  - Continue clobetasol soln PRN when symptoms are really bad  - Fine to continue Viviscal  - Increase PO minoxidil to 2.5 mg PO daily  (shouldn't affect BP really at this dose)  - Future: Interdisciplinary discussion about going back on an oral contraceptives, Dupixent (PA process started)    Procedures Performed:   Kenalog intralesional injection procedure note  - location(s): scalp  - strength: 10 mg/ml  - volume: 2 ml  - number of injection sites: 20  After verbal consent and discussion of risks including but not limited to atrophy, pain, and bruising, time out was performed, patient positioned, area cleaned with alcohol, Kenalog injected into affected sites; patient tolerated procedure well    Follow-up: 6 month(s) in person, or earlier for new or changing lesions    Staff and Resident:    Danica Wagoner MD  Dermatology Resident PGY4    Patient was seen and examined with the dermatology resident. I agree with the history, review of systems, physical examination, assessments and plan. ILK injections were done together.     Marlys Carbone MD  Professor and  Chair  Department of Dermatology  [facility]     ____________________________________________    CC: Hair Loss (Patient reports minimal improvement since their last visit. The patient reports increased hair loss and thinning with current treatment. )    HPI:  Ms. Va Riddle is a 43 year old female who presents as a return patient for follow-up of non-scarring hair loss with ACD and significant scalp symptoms. Last seen about 6m ago at which time she was noted to have a PPD allergy. Since the last visit she did dye her hair and thinks she possibly had a PPD exposure given that her scalp erupted afterwards. Overall she feels like she is continuing to have hair thinning and her scalp is constantly tingling especially at the occiput. Taking PO minoxidil 1.25 mg daily and tolerating well. Recently restarted clobetasol soln which is somewhat helpful but not much.    ILK at other derm, Viviscal Any new medications, supplements, or products? (please list below)     +  tender to touch/tingling Scalp pain   No Scalp burning   + Scalp itching    + thinning Eyebrow changes    No Eyelash changes   No Beard changes    No Other body hair changes    No Nail changes    Scalp symptoms flare around periods. Additional symptoms? (please list below)     - Overall course: worse; feels she is losing more hair and scalp more tingly  - COVID status: yes, November 2020     Patient is otherwise feeling well, in usual state of health, and has no additional skin concerns today.     ROS: As per HPI    Labs:  None reviewed.    Physical Exam:  Vitals: BP (!) 127/91 (BP Location: Right arm, Patient Position: Sitting)   Pulse 99   GEN: Well developed, well-nourished, in no acute distress, in a pleasant mood.    SKIN: Focused examination of hair, scalp, and trunk was performed.  - Slater type: I-II  - Diffuse erythema throughout the scalp with some mild loose scaling  - Normal eyebrow density  - Hair pull test negative  - Unable to examine eyelash density due to cosmetic lashes  - In comparison to prior photographs, scalp health stable to slightly worse  - No other lesions of concern on areas examined.       Medications:  Current Outpatient Medications   Medication    amphetamine-dextroamphetamine (ADDERALL) 20 MG tablet    atenolol (TENORMIN) 25 MG tablet    clobetasol (TEMOVATE) 0.05 % external solution    ketoconazole (NIZORAL) 2 % external shampoo    linaclotide (LINZESS) 290 MCG capsule    MINOXIDIL FOR WOMEN EX    sucralfate (CARAFATE) 1 GM tablet    UBRELVY 100 MG tablet    XOLAIR 150 MG/ML SOSY injection    betamethasone dipropionate (DIPROSONE) 0.05 % external cream     Current Facility-Administered Medications   Medication    nitroFURantoin macrocrystal-monohydrate (MACROBID) capsule 100 mg      Past Medical History:   There is no problem list on file for this patient.    History reviewed. No pertinent past medical history.    CC No referring provider defined for this encounter. on close of  this encounter.

## 2024-02-20 NOTE — LETTER
2/20/2024       RE: Va Riddle  [address] [place] [address]     Dear Colleague,    Thank you for referring your patient, Va Riddle, to the [facility]. Please see a copy of my visit note below.    [facility] Dermatology Note  Encounter Date: Feb 20, 2024  Office Visit     Dermatology Problem List:  1. Hair Loss, outside bx on 5/16/22 at  dermatology showed androgenetic alopecia vs dermal hypersensitivity   - Repeat bx 3/13/2023 showed sparse perivascular inflammation  - Hair Metrix 3/13/2023, 8/18/23  - Epidermal nerve biopsy 3/13/2023: no evidence of neuropathy  - Previous tx: ILK 7/10/23, topical spironolactone  - Current tx: minoxidil 2.5 mg PO daily (increased 2/20/24), clobetasol solution, allergen avoidance  - Future: dupixent (PA process started 2/20/24)  2. Allergic contact dermatitis  - Patch testing with Dr. 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 H/o BCC on bx 3/13/23  - Monitor  ____________________________________________    Assessment & Plan:   #  Non-scarring alopecia with ACD and significant scalp symptoms (tingling)  She feels like her hair loss and scalp symptoms are worsening since the last visit. We suspect this is in part secondary to a probable exposure to PPD especially given pinkness to the scalp on exam today. She did not get topical gabapetin after the last visit, so will start this today to attempt to control symptoms as well. She was noted to have an atopic predisposition and does have a history of random eczematous rashes on the extremities, so we discussed Dupixent today as a next step if topicals do not work. She has had significant hair regrowth with PO minoxidil which has plateaued so will increase this as well today. Interestingly,  her symptoms flare the worst with menstrual periods. OCPs were previously stopped due to HTN.  - Adhere to CAMP list  - ILK (see procedure note)  - Start topical gabapentin 6% solution nightly  - Continue clobetasol soln PRN when symptoms are really bad  - Fine to continue Viviscal  - Increase PO minoxidil to 2.5 mg PO daily (shouldn't affect BP really at this dose)  - Future: Interdisciplinary discussion about going back on an oral contraceptives, Dupixent (PA process started)    Procedures Performed:   Kenalog intralesional injection procedure note  - location(s): scalp  - strength: 10 mg/ml  - volume: 2 ml  - number of injection sites: 20  After verbal consent and discussion of risks including but not limited to atrophy, pain, and bruising, time out was performed, patient positioned, area cleaned with alcohol, Kenalog injected into affected sites; patient tolerated procedure well    Follow-up: 6 month(s) in person, or earlier for new or changing lesions    Staff and Resident:    Danica Wagoner MD  Dermatology Resident PGY4    Patient was seen and examined with the dermatology resident. I agree with the history, review of systems, physical examination, assessments and plan. ILK injections were done together.     Marlys Carbone MD  Professor and  Chair  Department of Dermatology  [facility]     ____________________________________________    CC: Hair Loss (Patient reports minimal improvement since their last visit. The patient reports increased hair loss and thinning with current treatment. )    HPI:  Ms. Va Riddle is a 43 year old female who presents as a return patient for follow-up of non-scarring hair loss with ACD and significant scalp symptoms. Last seen about 6m ago at which time she was noted to have a PPD allergy. Since the last visit she did dye her hair and thinks she possibly had a PPD exposure given that her scalp erupted afterwards. Overall she feels like she is  continuing to have hair thinning and her scalp is constantly tingling especially at the occiput. Taking PO minoxidil 1.25 mg daily and tolerating well. Recently restarted clobetasol soln which is somewhat helpful but not much.    ILK at other derm, Viviscal Any new medications, supplements, or products? (please list below)     + tender to touch/tingling Scalp pain   No Scalp burning   + Scalp itching    + thinning Eyebrow changes    No Eyelash changes   No Beard changes    No Other body hair changes    No Nail changes    Scalp symptoms flare around periods. Additional symptoms? (please list below)     - Overall course: worse; feels she is losing more hair and scalp more tingly  - COVID status: yes, November 2020     Patient is otherwise feeling well, in usual state of health, and has no additional skin concerns today.     ROS: As per HPI    Labs:  None reviewed.    Physical Exam:  Vitals: BP (!) 127/91 (BP Location: Right arm, Patient Position: Sitting)   Pulse 99   GEN: Well developed, well-nourished, in no acute distress, in a pleasant mood.    SKIN: Focused examination of hair, scalp, and trunk was performed.  - Slaetr type: I-II  - Diffuse erythema throughout the scalp with some mild loose scaling  - Normal eyebrow density  - Hair pull test negative  - Unable to examine eyelash density due to cosmetic lashes  - In comparison to prior photographs, scalp health stable to slightly worse  - No other lesions of concern on areas examined.       Medications:  Current Outpatient Medications   Medication     amphetamine-dextroamphetamine (ADDERALL) 20 MG tablet     atenolol (TENORMIN) 25 MG tablet     clobetasol (TEMOVATE) 0.05 % external solution     ketoconazole (NIZORAL) 2 % external shampoo     linaclotide (LINZESS) 290 MCG capsule     MINOXIDIL FOR WOMEN EX     sucralfate (CARAFATE) 1 GM tablet     UBRELVY 100 MG tablet     XOLAIR 150 MG/ML SOSY injection     betamethasone dipropionate (DIPROSONE) 0.05 %  external cream     Current Facility-Administered Medications   Medication     nitroFURantoin macrocrystal-monohydrate (MACROBID) capsule 100 mg      Past Medical History:   There is no problem list on file for this patient.    History reviewed. No pertinent past medical history.    CC No referring provider defined for this encounter. on close of this encounter.       Again, thank you for allowing me to participate in the care of your patient.      Sincerely,    Marlys Carbone MD

## 2024-02-20 NOTE — NURSING NOTE
Chief Complaint   Patient presents with    Hair Loss     Patient reports minimal improvement since their last visit. The patient reports increased hair loss and thinning with current treatment.      Meri Sagastume LPN

## 2024-02-20 NOTE — PATIENT INSTRUCTIONS
Low dose oral minoxidil does not really affect blood pressure. You could increase to 2.5 mg (a full pill) daily. This probably will not help manage your blood pressure, though. Be sure to continue following with your PCP. If you notice adverse side effects such as increased facial hair or swelling of the hands/legs, decrease back to 1.25 mg (half a pill) daily.     Start gabapentin 6% solution nightly.

## 2024-02-27 ENCOUNTER — TELEPHONE (OUTPATIENT)
Dept: DERMATOLOGY | Facility: CLINIC | Age: 44
End: 2024-02-27
Payer: COMMERCIAL

## 2024-02-27 DIAGNOSIS — L65.9 LOSS OF HAIR: ICD-10-CM

## 2024-02-27 DIAGNOSIS — R20.8 DYSESTHESIA OF SCALP: ICD-10-CM

## 2024-02-27 NOTE — TELEPHONE ENCOUNTER
[facility] Call Center    Phone Message    May a detailed message be left on voicemail: no     Reason for Call: Other: Pharmacy doesn't carry gabapentin 6 % SOLN solution/ only oral.  If you need another medication sent or sent to another Pharmacy to fill.  Call [phone] Fanny     Action Taken: Message routed to:  [facility] DERM/ [place]    Travel Screening: Not Applicable

## 2024-02-28 NOTE — TELEPHONE ENCOUNTER
Meeting with patient for an MTM visit on 3/6/24. Sent new prescription for gabapentin 6% compounded solution to [facility].     Elena Alvarado, PharmD  [facility] Pharmacist

## 2024-03-06 ENCOUNTER — VIRTUAL VISIT (OUTPATIENT)
Dept: RHEUMATOLOGY | Facility: CLINIC | Age: 44
End: 2024-03-06
Attending: DERMATOLOGY
Payer: COMMERCIAL

## 2024-03-06 DIAGNOSIS — L30.9 DERMATITIS: Primary | ICD-10-CM

## 2024-03-06 DIAGNOSIS — L20.89 OTHER ATOPIC DERMATITIS: ICD-10-CM

## 2024-03-06 DIAGNOSIS — L20.9 ATOPIC DERMATITIS: ICD-10-CM

## 2024-03-06 NOTE — PROGRESS NOTES
Medication Therapy Management (MTM) Encounter    ASSESSMENT:                            Medication Adherence/Access:   Dupixent coverage - needs PA initiated.  Gabapentin 6% solution - needs PA initiated     Non-scarring Alopecia/Dermatitis:   Flaring despite treatment with topical steroids. Given the extend of the BSA involvement & chronic recurrent flares, dermatology recommended initiating Dupixent for Atopic Dermatitis with secondary role in the treatment of alopecia. Provided education on Dupixent today including dosing, administration, side effects, precautions, monitoring, and time to efficacy. We also discuss the role of gabapentin compounded solution for scalp. Patient prefers to hold off on filling until coverage has been checked.     Urticaria:   Some improvement with Xolair. Discussed different mechanisms & use in treatment with Xolair and Dupxient. Explained that there limited data or a general consensus on risks of combined use. However since they are being used to treat different conditions & are not immunosuppressive, feel that it would be reasonable for patient to continue treatment with both.     PLAN:                            Sent orders for Dupixent. Will work on getting this covered  Pending coverage:   Started Dupixent 600 mg subcutaneous injection x 1 dose, followed by 300 mg every 14 days thereafter   Initiated PA for gabapentin 6% compounded solution. Sent this to [facility]     Follow-up: via [employer] message with updates on coverage and with me (Elena Alvarado [facility]) for an MTM follow up appointment by phone/video around 3 months after initiation     SUBJECTIVE/OBJECTIVE:                          Va Riddle is a 43 year old female called for an initial visit. She was referred to me from Dr. Marlys Carbone MD.      Reason for visit: initiation of gabapentin 6% compounded solution & Dupixent.    Medication Adherence/Access:   [facility]  Specialty  Dupixent coverage:   - Needs PA initiated.  Gabapentin 6% solution coverage:   - PA status: needs to be initiated  - Available at [facility] for $64 for 60 mL     Non-scarring Alopecia/Dermatitis :   - Dupixent (dupilumab) 600 mg x 1, then every 14 days (pursuing insurance coverage)   - Oral minoxidil 2.5 mg daily (increased dose in Feb 2024)   - gabapentin 6% compounded solution (has not started)    - Clobetasol 0.05% solution as needed for severe flares   - Ketoconazole 2% shampoo as needed  - Intralesional steroid injections around 1-2 months   Side effects: None.    Scalp symptoms include tingling with increased hair shedding. Reports if gabapentin solution is covered will start this, but is most interested in starting Dupixent for combined treatment of alopecia & dermatitis.     Patient reports primary concern is chronic flares in dermatitis. Affected areas include the trunk, back, legs, arms, scalp. History of partial atopic dermatitis & allergic contact dermatitis per Dr. Amezcua.     Specialist: Dr. Marlys Carbone MD, Dermatology. Last visit on 2/20/24. The following was recommended:   - Start topical gabapentin 6% solution nightly  - Continue clobetasol soln PRN when symptoms are really bad  - Increase PO minoxidil to 2.5 mg PO daily (shouldn't affect BP really at this dose)  - Pursue Dupixent (PA process started)    Specialist: Dr. Jose Amezcua, Derm-Allergy. Last visit 8/18/23. Final diagnoses/treatment plan:   - Recurrent pruritic, papular dermatitis on trunk & extremities: partially allergic contact dermatitis to hair dye + partially atopic dermatitis    Previous treatment: Reports she has tried numerous treatments for dermatitis without success.   - Topical steroids: Betamethasone; triamcinolone, clobetasol  - Oral prednisone tapers as needed for acute flares (last taken Dec 2023)   - montelukast 10 mg daily (for >3 months): not effective    - Antihistamines:  "cetrizine, fexofenadine, loratadine, diphenhydramine (all for >3 months, not effective)    - tazarotene 0.1% cream   - oral antibiotics: clindamycin 300 mg BID, cephalexin 500 mg TID, metronidazole 500 mg BID    Urticaria:   - Xolair (omalizumab) 300 mg every 3 weeks (started ~Mar 2023)   - Betamethasone 0.05% cream as needed   Side effects: none, has tolerated Xolair without issues.     Patient reports she was started on Xolair by an outside dermatologist around a year ago for chronic urticaria and dermatitis.  Notes that she has found Xolair is somewhat helpful for keeping urticaria flares \"at bay\" but has not resolved symptom. However, continues to have chronic dermatitis flares with significant BSA involvement.     Follows with outside dermatologist for management of Xolair (prescribed by Nani Villeda)     Specialist: Dr. Jose Amezcua, Derm-Allergy. Last visit 8/18/23. Final diagnoses/treatment plan:   - Urticaria triggered by heat and stress  - Some improvement on Xolair  - Multiple drug allergies:   - Nitrofurantoin = hives ; skin tests negatives, but strong clinical reaction --> avoid  - Sulfonamides = hives ; skin tests neg  - Cephalexin = hives ; skin tests neg    Allergies/ADRs: Reviewed in chart  Past Medical History: Reviewed in chart  Tobacco: She reports that she has never smoked. She has never used smokeless tobacco.  ----------------    I spent 23 minutes with this patient today. All changes were made via collaborative practice agreement with Dr. Marlys Carbone. A copy of the visit note was provided to the patient's provider(s).    A summary of these recommendations was sent via [employer].    Elena Alvarado, PharmD  Medication Therapy Management Pharmacist   [facility]    Telemedicine Visit Details  Type of service:  Telephone visit  Start Time:  1:00pm  End Time:  1:23pm     Medication Therapy Recommendations  No medication therapy recommendations to display   "

## 2024-03-06 NOTE — LETTER
3/6/2024       RE: Va Riddle  [address] [facility] [address]     Dear Colleague,    Thank you for referring your patient, Va Riddle, to the [facility] RHEUMATOLOGY CLINIC [place] at [facility]. Please see a copy of my visit note below.    Medication Therapy Management (MTM) Encounter    ASSESSMENT:                            Medication Adherence/Access:   Dupixent coverage - needs PA initiated.  Gabapentin 6% solution - needs PA initiated     Non-scarring Alopecia/Dermatitis:   Flaring despite treatment with topical steroids. Given the extend of the BSA involvement & chronic recurrent flares, dermatology recommended initiating Dupixent for Atopic Dermatitis with secondary role in the treatment of alopecia. Provided education on Dupixent today including dosing, administration, side effects, precautions, monitoring, and time to efficacy. We also discuss the role of gabapentin compounded solution for scalp. Patient prefers to hold off on filling until coverage has been checked.     Urticaria:   Some improvement with Xolair. Discussed different mechanisms & use in treatment with Xolair and Dupxient. Explained that there limited data or a general consensus on risks of combined use. However since they are being used to treat different conditions & are not immunosuppressive, feel that it would be reasonable for patient to continue treatment with both.     PLAN:                            Sent orders for Dupixent. Will work on getting this covered  Pending coverage:   Started Dupixent 600 mg subcutaneous injection x 1 dose, followed by 300 mg every 14 days thereafter   Initiated PA for gabapentin 6% compounded solution. Sent this to [facility]     Follow-up: via [employer] message with updates on coverage and with me (Elena Alvarado [facility]) for an MTM follow up appointment by phone/video around 3 months after  initiation     SUBJECTIVE/OBJECTIVE:                          Va Riddle is a 43 year old female called for an initial visit. She was referred to me from Dr. Marlys Carbone MD.      Reason for visit: initiation of gabapentin 6% compounded solution & Dupixent.    Medication Adherence/Access:   Reports Xolair is filled at [facility]  Dupixent coverage:   - Needs PA initiated.  Gabapentin 6% solution coverage:   - PA status: needs to be initiated  - Available at [facility] for $64 for 60 mL     Non-scarring Alopecia/Dermatitis :   - Dupixent (dupilumab) 600 mg x 1, then every 14 days (pursuing insurance coverage)   - Oral minoxidil 2.5 mg daily (increased dose in Feb 2024)   - gabapentin 6% compounded solution (has not started)    - Clobetasol 0.05% solution as needed for severe flares   - Ketoconazole 2% shampoo as needed  - Intralesional steroid injections around 1-2 months   Side effects: None.    Scalp symptoms include tingling with increased hair shedding. Reports if gabapentin solution is covered will start this, but is most interested in starting Dupixent for combined treatment of alopecia & dermatitis.     Patient reports primary concern is chronic flares in dermatitis. Affected areas include the trunk, back, legs, arms, scalp. History of partial atopic dermatitis & allergic contact dermatitis per Dr. Amezcua.     Specialist: Dr. Marlys Carbone MD, Dermatology. Last visit on 2/20/24. The following was recommended:   - Start topical gabapentin 6% solution nightly  - Continue clobetasol soln PRN when symptoms are really bad  - Increase PO minoxidil to 2.5 mg PO daily (shouldn't affect BP really at this dose)  - Pursue Dupixent (PA process started)    Specialist: Dr. Jose Amezcua, Derm-Allergy. Last visit 8/18/23. Final diagnoses/treatment plan:   - Recurrent pruritic, papular dermatitis on trunk & extremities: partially allergic contact dermatitis to hair dye + partially  "atopic dermatitis    Previous treatment: Reports she has tried numerous treatments for dermatitis without success.   - Topical steroids: Betamethasone; triamcinolone, clobetasol  - Oral prednisone tapers as needed for acute flares (last taken Dec 2023)   - montelukast 10 mg daily (for >3 months): not effective    - Antihistamines: cetrizine, fexofenadine, loratadine, diphenhydramine (all for >3 months, not effective)    - tazarotene 0.1% cream   - oral antibiotics: clindamycin 300 mg BID, cephalexin 500 mg TID, metronidazole 500 mg BID    Urticaria:   - Xolair (omalizumab) 300 mg every 3 weeks (started ~Mar 2023)   - Betamethasone 0.05% cream as needed   Side effects: none, has tolerated Xolair without issues.     Patient reports she was started on Xolair by an outside dermatologist around a year ago for chronic urticaria and dermatitis.  Notes that she has found Xolair is somewhat helpful for keeping urticaria flares \"at bay\" but has not resolved symptom. However, continues to have chronic dermatitis flares with significant BSA involvement.     Follows with outside dermatologist for management of Xolair (prescribed by Nani Villeda)     Specialist: Dr. Jose Amezcua, Derm-Allergy. Last visit 8/18/23. Final diagnoses/treatment plan:   - Urticaria triggered by heat and stress  - Some improvement on Xolair  - Multiple drug allergies:   - Nitrofurantoin = hives ; skin tests negatives, but strong clinical reaction --> avoid  - Sulfonamides = hives ; skin tests neg  - Cephalexin = hives ; skin tests neg    Allergies/ADRs: Reviewed in chart  Past Medical History: Reviewed in chart  Tobacco: She reports that she has never smoked. She has never used smokeless tobacco.  ----------------    I spent 23 minutes with this patient today. All changes were made via collaborative practice agreement with Dr. Marlys Carbone. A copy of the visit note was provided to the patient's provider(s).    A summary of these recommendations was " sent via [employer].    Elena Alvarado, PharmD  Medication Therapy Management Pharmacist   [facility]    Telemedicine Visit Details  Type of service:  Telephone visit  Start Time:  1:00pm  End Time:  1:23pm     Medication Therapy Recommendations  No medication therapy recommendations to display       Again, thank you for allowing me to participate in the care of your patient.      Sincerely,    Elena Alvarado RPH

## 2024-03-07 ENCOUNTER — TELEPHONE (OUTPATIENT)
Dept: DERMATOLOGY | Facility: CLINIC | Age: 44
End: 2024-03-07
Payer: COMMERCIAL

## 2024-03-07 NOTE — TELEPHONE ENCOUNTER
Prior Authorization Retail Medication Request    Medication/Dose: Gabapentin 6% compounded solution   New prior authorization Request:   Rationale:  Please request appeal if needed from Elena Alvarado Rph.     Insurance   Primary: BCBS OUT OF STATE  Insurance ID:  [id number]     Elena Alvarado, [place]  MTM Pharmacist

## 2024-03-08 ENCOUNTER — TELEPHONE (OUTPATIENT)
Dept: DERMATOLOGY | Facility: CLINIC | Age: 44
End: 2024-03-08
Payer: COMMERCIAL

## 2024-03-08 NOTE — TELEPHONE ENCOUNTER
PA Initiation    Medication: DUPIXENT 300 MG/2ML SC SOPN  Insurance Company: [employer] Specialty - Phone [phone] Fax [phone]  [facility] the Rx[facility] - [address] [place] [address]  Filling Pharmacy Phone:    Filling Pharmacy Fax:    Start Date: 3/8/2024      Key: [id number]

## 2024-03-10 ENCOUNTER — HEALTH MAINTENANCE LETTER (OUTPATIENT)
Age: 44
End: 2024-03-10

## 2024-03-13 NOTE — TELEPHONE ENCOUNTER
Prior Authorization Approval    Medication: DUPIXENT 300 MG/2ML SC SOPN  Authorization Effective Date: 2/7/2024  Authorization Expiration Date: 7/6/2024  Approved Dose/Quantity: 6ml for 28 days  Reference #: Key: [id number]   Insurance Company: [employer] Specialty - Phone [phone] Fax [phone]  Expected CoPay: $    CoPay Card Available: No    Financial Assistance Needed:   Which Pharmacy is filling the prescription: [facility] - [address] [place] [address]  Pharmacy Notified: yes  Patient Notified: yes

## 2024-03-18 ENCOUNTER — TELEPHONE (OUTPATIENT)
Dept: DERMATOLOGY | Facility: CLINIC | Age: 44
End: 2024-03-18
Payer: COMMERCIAL

## 2024-03-18 NOTE — TELEPHONE ENCOUNTER
Via phone informed pt of the following appt   Appointment type: Return HL  Provider: Dr. Carbone   Return date: 7/12/24  Specialty phone number: [phone]

## 2024-03-20 NOTE — TELEPHONE ENCOUNTER
Central Prior Authorization Team - Phone: [phone]     PA Initiation    Medication: COMPOUNDED NON-CONTROLLED (CMPD RX) - PHARMACY TO MIX COMPOUNDED MEDICATION  Insurance Company: [employer] Non-Specialty PA's - Phone [phone] Fax [phone]  Pharmacy Filling the Rx: [facility] - [place], [place] - [address]   Filling Pharmacy Phone: [phone]  Filling Pharmacy Fax:    Start Date: 3/20/2024

## 2024-03-25 NOTE — TELEPHONE ENCOUNTER
Prior Authorization Follow Up    Received fax requesting to verify patient information. Completed and returned via CMM. Waiting for determination.    PA DEPT  will follow up again on status in 1 to 2 business days.

## 2024-03-27 ENCOUNTER — TELEPHONE (OUTPATIENT)
Dept: RHEUMATOLOGY | Facility: CLINIC | Age: 44
End: 2024-03-27
Payer: COMMERCIAL

## 2024-03-27 DIAGNOSIS — L30.9 DERMATITIS: ICD-10-CM

## 2024-03-27 NOTE — TELEPHONE ENCOUNTER
Patient has not reviewed [employer] message about Dupixent. Called & spoke with patient. Updated her of insurance approval. Reports she spoke with [facility] & she requested we try to resend this for a 3 month supply to see if insurance will cover this. Resent orders today.     Also updated patient that PA for gabapentin 6% compounded solution is still in progress.     Elena Alvraado, PharmD  [place] Pharmacist

## 2024-03-28 NOTE — TELEPHONE ENCOUNTER
Prior Authorization Follow Up    Received fax stating this compounded drug must go through patient plan for coverage review.    I have called [employer] at [phone] and spoke with representative. Benefits active but compounded drugs are not covered under plan benefits.

## 2024-03-28 NOTE — TELEPHONE ENCOUNTER
Central Prior Authorization Team - Phone: [phone]     PRIOR AUTHORIZATION DENIED    Medication: COMPOUNDED NON-CONTROLLED (CMPD RX) - PHARMACY TO MIX COMPOUNDED MEDICATION  Insurance Company: [employer] Non-Specialty PA's - Phone [phone] Fax [phone]  Denial Date: 3/28/2024    Denial Reason(s): compounds not covered under pharmacy benefits.    Appeal Information: [facility] [phone]    Patient Notified: no  Unfortunately, we cannot call the patient with denials because we do not know what next steps the MD will take nor can we give medical advice, please notify the patient of what they are to expect for the continuation of their therapy from the provider.

## 2024-07-10 ENCOUNTER — TELEPHONE (OUTPATIENT)
Dept: DERMATOLOGY | Facility: CLINIC | Age: 44
End: 2024-07-10
Payer: COMMERCIAL

## 2024-07-10 ENCOUNTER — MYC MEDICAL ADVICE (OUTPATIENT)
Dept: DERMATOLOGY | Facility: CLINIC | Age: 44
End: 2024-07-10
Payer: COMMERCIAL

## 2024-07-10 NOTE — TELEPHONE ENCOUNTER
"Spoke with patient about scheduling with Dr. Carbone    Patient lives in [place] and is requesting a phone visit. I explained that we do not allow phone visits for out of state patients. [place].    Patient was last seen on 2/20/24 and is due for a 6 month follow-up in August 2024.    I informed patient that I would look at the schedule and send her a [employer] message with options.    Kimberly Pulido LPN    "

## 2024-07-10 NOTE — TELEPHONE ENCOUNTER
[facility] Call Center    Phone Message    May a detailed message be left on voicemail: yes     Reason for Call: Other: Per Pt she needs to reschedule for the next available appointment with Dr. Carbone but none are available for [facility] or [facility]. Please call with options and wait list. Thank you.     Action Taken: Message routed to:  [facility] ([facility] Derm    Travel Screening: Not Applicable

## 2024-07-11 ENCOUNTER — TELEPHONE (OUTPATIENT)
Dept: DERMATOLOGY | Facility: CLINIC | Age: 44
End: 2024-07-11
Payer: COMMERCIAL

## 2024-07-11 NOTE — TELEPHONE ENCOUNTER
Retail Pharmacy Prior Authorization Team   Phone: [phone]    Prior Authorization Specialty Medication Request    PLACED QUESTION SET FROM INSURANCE IN LIAISON FOLDER    Medication/Dose: dupilumab (DUPIXENT) 300 MG/2ML prefilled pen  Diagnosis and ICD code (if different than what is on RX):    New/renewal/insurance change PA/secondary ins. PA:  Previously Tried and Failed:      Important Lab Values:   Rationale:     Insurance   Primary:   Insurance ID:      Secondary (if applicable):  Insurance ID:      Pharmacy Information (if different than what is on RX)  Name:    Phone:    Fax:

## 2024-07-15 ENCOUNTER — VIRTUAL VISIT (OUTPATIENT)
Dept: DERMATOLOGY | Facility: CLINIC | Age: 44
End: 2024-07-15
Attending: DERMATOLOGY
Payer: COMMERCIAL

## 2024-07-15 ENCOUNTER — TELEPHONE (OUTPATIENT)
Dept: DERMATOLOGY | Facility: CLINIC | Age: 44
End: 2024-07-15
Payer: COMMERCIAL

## 2024-07-15 DIAGNOSIS — L30.9 DERMATITIS: Primary | ICD-10-CM

## 2024-07-15 DIAGNOSIS — L50.9 URTICARIA: ICD-10-CM

## 2024-07-15 DIAGNOSIS — L65.9 LOSS OF HAIR: ICD-10-CM

## 2024-07-15 RX ORDER — ALPRAZOLAM 0.5 MG
1 TABLET ORAL
COMMUNITY
Start: 2024-06-07

## 2024-07-15 NOTE — TELEPHONE ENCOUNTER
Called patient to assist with Dupixent PA renewal. See MTM progress note from 7/15/24.     Elena Alvarado, PharmD  MTM Pharmacist

## 2024-07-15 NOTE — TELEPHONE ENCOUNTER
PA Initiation    Medication: DUPIXENT 300 MG/2ML SC SOPN  Insurance Company: [employer] Specialty - Phone [phone] Fax [phone]  Pharmacy Filling the Rx: [facility] [place] - [address] [place] [address]  Filling Pharmacy Phone:    Filling Pharmacy Fax:    Start Date: 7/12/2024    Faxed pa along with chart notes

## 2024-07-15 NOTE — TELEPHONE ENCOUNTER
Spoke with patient on this date to schedule phone visit follow-up. Patient had to cancel her July 12 in-person visit    Patient has a phone visit scheduled for 7/26/24 at 2:20 pm.     Kimberly Pulido LPN

## 2024-07-15 NOTE — Clinical Note
Hi Dr. Carbone - DENNIS connected with this patient today to help with Dupixent PA renewal. She is doing extremely well on dupixent for dermatitis & chronic urticaria. Patient self-stopped Xolair around 2 months ago & symptoms have remained stable/improved on Dupixent alone. Patient reports minimal response in scalp dysthesia & hair loss but told her she could address this further with you at follow-up on 7/26/24. Thanks!  Elena Alvarado [facility]

## 2024-07-15 NOTE — Clinical Note
Dupixent cont - call for 6 month follow-up ~1/15/24 - [facility] - Last MT 7/15/24

## 2024-07-15 NOTE — Clinical Note
7/15/2024       RE: Va Riddle  [address] [facility] [facility] [address]     Dear Colleague,    Thank you for referring your patient, Va Riddle, to the [facility] RHEUMATOLOGY CLINIC [place] at [facility]. Please see a copy of my visit note below.    Medication Therapy Management (MTM) Encounter    ASSESSMENT:                            Non-scarring Alopecia/Dermatitis/Chronic Urticaria:   Chronic dermatitis significantly improved with reductions in BSA involvement, need for topical therapies, and chronic pruritus following initiation of Dupixent ~ 4 months ago. Patient self-stopped Xolair for management of chronic urticaria ~2 months ago & symptoms have remained well controlled on Dupixent alone. Patient would benefit from continuing current regimen of Dupixent 300 mg every 14 days. Will switch to prefilled syringes vs auto-injector pen for ease of administration. Discussed it may take longer to observe benefits to hair loss but that patient should address further with Dr. Carbone at upcoming follow-up.     PLAN:                            Continue Dupixent (dupilumab) 300 mg every 14 days.   Sent new Rx for prefilled syringes instead  Preference for 84 vs 28 day supply at a time   Will work with pharmacy liaisons to renew PA approval     Follow-up: with me (Elena Alvarado [facility]) for an MTM follow up appointment by phone/video in around 6 months (1/15/25) to review response to Dupixent    SUBJECTIVE/OBJECTIVE:                          aV Riddle is a 44 year old female called for a follow-up visit from 3/6/24.      Reason for visit: Dupixent follow-up     Medication Adherence/Access:   Dupixent coverage:   - Needs PA renewal. Pt prefers to switch to syringes. Requests 3-month fill   - Fills at [facility]   Gabapentin 6% solution coverage:   - PA denied. Need to purchase out of pocket   - Available at [facility] for $64 for 60 mL     Non-scarring Alopecia/Dermatitis/Chronic Urticaria:   - Dupixent (dupilumab) 300 mg every 14 days (started ~3/20/24)  - Oral minoxidil 2.5 mg daily (increased dose in Feb 2024)   - Clobetasol 0.05% solution as needed for severe flares   - Ketoconazole 2% shampoo as needed  - Intralesional steroid injections around 1-2 months   - Betamethasone 0.05% cream as needed   Side effects: occasional dry & itchy eyes with Dupixent but states this has been mild & tolerable. Denies any other side effects     Patient reports chronic flares in dermatitis have significant improved with starting Dupixent. Previously affected areas included trunk, back, legs, arms, scalp. Today reports no current flares in dermatitis (well controlled) & that pruritus has improved as well. Also notes minimal need for as needed topical steroids with improved response in dermatitis. Patient shares she stopped Xolair 300 mg every 3 weeks for chronic urticaria around 2 months ago (May 2024). States that urticaria has remained well controlled on Dupixent alone & that she plans to remain off the Xolair moving forward.     Patient reports scalp symptoms with pain, tingling & increased hair shedding have had limited response with Dupixent but no worsening in these symptoms.    Requests 84 day vs 28 days refills, last filled without issues. Also requests Dupixent prefilled syringes vs autoinjector pen to help with some pain & issues related to injections with pen device.     Specialist: Dr. Marlys Carbone MD, Dermatology. Last visit on 2/20/24. The following was recommended:   - Start topical gabapentin 6% solution nightly  - Continue clobetasol soln PRN when symptoms are really bad  - Increase PO minoxidil to 2.5 mg PO daily (shouldn't affect BP really at this dose)  - Pursue Dupixent (PA process started)    Specialist: Dr. Jose Amezcua, Derm-Allergy. Last visit 8/18/23. Final diagnoses/treatment plan:   - Recurrent  pruritic, papular dermatitis on trunk & extremities: partially allergic contact dermatitis to hair dye + partially atopic dermatitis  - Urticaria triggered by heat and stress - Some improvement on Xolair    Also follows with outside dermatologist for management of Xolair (prescribed by Nani Villeda)     Previous treatment: has tried numerous treatments for dermatitis without success.   - Topical steroids: Betamethasone; triamcinolone, clobetasol  - Oral prednisone tapers as needed for acute flares (last taken Dec 2023)   - montelukast 10 mg daily (for >3 months): not effective    - Antihistamines: cetrizine, fexofenadine, loratadine, diphenhydramine (all for >3 months, not effective)    - tazarotene 0.1% cream   - oral antibiotics: clindamycin 300 mg BID, cephalexin 500 mg TID, metronidazole 500 mg twice daily  - Xolair (omalizumab) 300 mg every 3 weeks (03/2023-05/2024): pt self-stopped given response to Dupixent, no flares in urticaria with stopping treatment     Allergies/ADRs: Reviewed in chart  Past Medical History: Reviewed in chart  Tobacco: She reports that she has never smoked. She has never used smokeless tobacco.  ----------------    I spent 10 minutes with this patient today. All changes were made via collaborative practice agreement with Marlys Carbone. A copy of the visit note was provided to the patient's provider(s).    A summary of these recommendations was sent via [employer].    Elena Alvarado PharmD  Medication Therapy Management Pharmacist   [facility]     Telemedicine Visit Details  Type of service:  Telephone visit  Start Time:  10:20am  End Time:  10:30am     Medication Therapy Recommendations  No medication therapy recommendations to display          Again, thank you for allowing me to participate in the care of your patient.      Sincerely,    Elena Alvarado RPH

## 2024-07-15 NOTE — PROGRESS NOTES
Medication Therapy Management (MTM) Encounter    ASSESSMENT:                            Non-scarring Alopecia/Dermatitis/Chronic Urticaria:   Chronic dermatitis significantly improved with reductions in BSA involvement, need for topical therapies, and chronic pruritus following initiation of Dupixent ~ 4 months ago. Patient self-stopped Xolair for management of chronic urticaria ~2 months ago & symptoms have remained well controlled on Dupixent alone. Patient would benefit from continuing current regimen of Dupixent 300 mg every 14 days. Will switch to prefilled syringes vs auto-injector pen for ease of administration. Discussed it may take longer to observe benefits to hair loss but that patient should address further with Dr. Carbone at upcoming follow-up.     PLAN:                            Continue Dupixent (dupilumab) 300 mg every 14 days.   Sent new Rx for prefilled syringes instead  Preference for 84 vs 28 day supply at a time   Will work with pharmacy liaisons to renew PA approval     Follow-up: with me (Elena Alvarado [facility]) for an MTM follow up appointment by phone/video in around 6 months (1/15/25) to review response to Dupixent    SUBJECTIVE/OBJECTIVE:                          Va Riddle is a 44 year old female called for a follow-up visit from 3/6/24.      Reason for visit: Dupixent follow-up     Medication Adherence/Access:   Dupixent coverage:   - Needs PA renewal. Pt prefers to switch to syringes. Requests 3-month fill   - Fills at [facility]   Gabapentin 6% solution coverage:   - PA denied. Need to purchase out of pocket   - Available at [facility] for $64 for 60 mL     Non-scarring Alopecia/Dermatitis/Chronic Urticaria:   - Dupixent (dupilumab) 300 mg every 14 days (started ~3/20/24)  - Oral minoxidil 2.5 mg daily (increased dose in Feb 2024)   - Ketoconazole 2% shampoo as needed  - Intralesional steroid injections around 1-2 months   - Clobetasol 0.05% solution as needed  for severe flares in dermatitis (not currently requiring)   Side effects: occasional dry & itchy eyes with Dupixent but states this has been mild & tolerable. Denies any other side effects     Patient reports chronic flares in dermatitis have significant improved with starting Dupixent. Previously affected areas included trunk, back, legs, arms, scalp. Today reports no current flares in dermatitis (well controlled) & that pruritus has improved as well. Also notes minimal need for as needed topical steroids with improved response in dermatitis. Patient shares she stopped Xolair 300 mg every 3 weeks for chronic urticaria around 2 months ago (May 2024). States that urticaria has remained well controlled on Dupixent alone & that she plans to remain off the Xolair moving forward.     Patient reports scalp symptoms with pain, tingling & increased hair shedding have had limited response with Dupixent but no worsening in these symptoms.    Requests 84 day vs 28 days refills, last filled without issues. Also requests Dupixent prefilled syringes vs autoinjector pen to help with some pain & issues related to injections with pen device.     Specialist: Dr. Marlys Carbone MD, Dermatology. Last visit on 2/20/24. The following was recommended:   - Start topical gabapentin 6% solution nightly  - Continue clobetasol soln PRN when symptoms are really bad  - Increase PO minoxidil to 2.5 mg PO daily (shouldn't affect BP really at this dose)  - Pursue Dupixent (PA process started)    Specialist: Dr. Jose Amezcua, Derm-Allergy. Last visit 8/18/23. Final diagnoses/treatment plan:   - Recurrent pruritic, papular dermatitis on trunk & extremities: partially allergic contact dermatitis to hair dye + partially atopic dermatitis  - Urticaria triggered by heat and stress - Some improvement on Xolair    Also follows with outside dermatologist for management of Xolair (prescribed by Nani Villeda)     Previous treatment: has tried numerous  treatments for dermatitis without success.   - Topical steroids: Betamethasone; triamcinolone, clobetasol  - Oral prednisone tapers as needed for acute flares (last taken Dec 2023)   - montelukast 10 mg daily (for >3 months): not effective    - Antihistamines: cetrizine, fexofenadine, loratadine, diphenhydramine (all for >3 months, not effective)    - tazarotene 0.1% cream   - oral antibiotics: clindamycin 300 mg BID, cephalexin 500 mg TID, metronidazole 500 mg twice daily  - Xolair (omalizumab) 300 mg every 3 weeks (03/2023-05/2024): pt self-stopped given response to Dupixent, no flares in urticaria with stopping treatment     Allergies/ADRs: Reviewed in chart  Past Medical History: Reviewed in chart  Tobacco: She reports that she has never smoked. She has never used smokeless tobacco.  ----------------    I spent 10 minutes with this patient today. All changes were made via collaborative practice agreement with Marlys Carbone. A copy of the visit note was provided to the patient's provider(s).    A summary of these recommendations was sent via [employer].    Elena Alvarado, PharmD  Medication Therapy Management Pharmacist   [facility]     Telemedicine Visit Details  Type of service:  Telephone visit  Start Time:  10:20am  End Time:  10:30am     Medication Therapy Recommendations  No medication therapy recommendations to display

## 2024-07-16 NOTE — TELEPHONE ENCOUNTER
Prior Authorization Approval    Medication: DUPIXENT 300 MG/2ML SC SOPN  Authorization Effective Date: 6/15/2024  Authorization Expiration Date: 7/15/2025  Approved Dose/Quantity: 4ml per 28 days  Reference #:     Insurance Company: [employer] - Phone [phone] Fax [phone]  Expected CoPay: $    CoPay Card Available: No    Financial Assistance Needed: no  Which Pharm[facility] is filling the prescription: [facility] - [address] [place] [address]  Pharmacy Notified: yes  Patient Notified: not needed

## 2024-07-23 ENCOUNTER — TELEPHONE (OUTPATIENT)
Dept: DERMATOLOGY | Facility: CLINIC | Age: 44
End: 2024-07-23
Payer: COMMERCIAL

## 2024-07-23 NOTE — TELEPHONE ENCOUNTER
Patient is not available Saturday morning 7/27/24, she will be at work    She is available around 3:00 pm on Saturday 7/27/24    Kimberly Pulido LPN

## 2024-07-24 NOTE — TELEPHONE ENCOUNTER
Phone visit moved to Saturday, 7/27/24 between 2:30-3:30 pm    Patient had no questions or concerns.    Kimberly Pulido LPN

## 2024-07-26 ENCOUNTER — TELEPHONE (OUTPATIENT)
Dept: DERMATOLOGY | Facility: CLINIC | Age: 44
End: 2024-07-26

## 2024-07-26 ASSESSMENT — PAIN SCALES - GENERAL: PAINLEVEL: MILD PAIN (2)

## 2024-07-26 ASSESSMENT — PATIENT HEALTH QUESTIONNAIRE - PHQ9: SUM OF ALL RESPONSES TO PHQ QUESTIONS 1-9: 12

## 2024-07-26 NOTE — PROGRESS NOTES
[facility] Dermatology Note  Encounter Date: Jul 27, 2024  Telephone ([phone] ). Location of teledermatologist: [facility] [place]. Start time: 2:25. End time: ***.    Dermatology Problem List:  1. 1. Hair Loss, outside bx on 5/16/22 at  dermatology showed androgenetic alopecia vs dermal hypersensitivity   - Repeat bx 3/13/2023 showed sparse perivascular inflammation  - Hair Metrix 3/13/2023, 8/18/23  - Epidermal nerve biopsy 3/13/2023: no evidence of neuropathy  - Previous tx: ILK 7/10/23, topical spironolactone  - Current tx: minoxidil 2.5 mg PO daily (increased 2/20/24), clobetasol solution, allergen avoidance  - Future: dupixent (PA process started 2/20/24)  2. Allergic contact dermatitis  - Patch testing with Dr. 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 H/o BCC on b    ____________________________________________    Assessment & Plan:      {Diagnosesderm:685176}.   Assessment & Plan:   #  Non-scarring alopecia with ACD and significant scalp symptoms (tingling)  She feels like her hair loss and scalp symptoms are worsening since the last visit. We suspect this is in part secondary to a probable exposure to PPD especially given pinkness to the scalp on exam today. She did not get topical gabapetin after the last visit, so will start this today to attempt to control symptoms as well. She was noted to have an atopic predisposition and does have a history of random eczematous rashes on the extremities, so we discussed Dupixent today as a next step if topicals do not work. She has had significant hair regrowth with PO minoxidil which has plateaued so will increase this as well today. Interestingly, her symptoms flare the worst with menstrual periods. OCPs were previously stopped due to HTN.  - Adhere to CAMP list  - ILK (see procedure note)  - Start topical  "gabapentin 6% solution nightly  - Continue clobetasol soln PRN when symptoms are really bad  - Fine to continue Viviscal  - Increase PO minoxidil to 2.5 mg PO daily (shouldn't affect BP really at this dose)  - Future: Interdisciplinary discussion about going back on an oral contraceptives, Dupixent (PA process started)    - ***     # {[facility]688919}.   {kkplans:613129}   - ***     Procedures Performed:    None    Follow-up: {kkfollowup:331439}    {kkstaffinvolved:310879}    ***  ____________________________________________    CC: Hair Loss (Scalp sensitivity around her menstrual cycle \"it gets red and angry\"/She wears clip in extensions and wants to know if the metal clips are giving her a reaction)    HPI:  Ms. Va Riddle is a(n) 44 year old female who presents today {kknew/return:242502} for ***    Patient is otherwise feeling well, without additional skin concerns.    Labs Reviewed:  ***N/A    Physical Exam:  Vitals: There were no vitals taken for this visit.  SKIN: Teledermatology photos were reviewed; image quality and interpretability: ***acceptable. Image date: ***.  - ***  - No other lesions of concern on areas examined.     Medications:  Current Outpatient Medications   Medication Sig Dispense Refill    ALPRAZolam (XANAX) 0.5 MG tablet Take 1 tablet by mouth daily at 2 pm      amphetamine-dextroamphetamine (ADDERALL) 20 MG tablet Take 1 tablet by mouth      atenolol (TENORMIN) 25 MG tablet       clobetasol (TEMOVATE) 0.05 % external solution       dupilumab (DUPIXENT) 300 MG/2ML prefilled syringe Inject 2 mLs (300 mg) subcutaneously every 14 days 12 mL 1    gabapentin 6 % SOLN solution APPLY 0.5 ML TO AFFECTED AREA ON SCALP DAILY AFTER SHAMPOOING ~ 6 PM. 60 mL 11    ketoconazole (NIZORAL) 2 % external shampoo APPLY TOPICALLY TO THE SCALP 2 TIMES A WEEK FOR 4 WEEKS. LEAVE ON FOR 5 MINUTES THEN RINSE OFF      linaclotide (LINZESS) 290 MCG capsule 1 capsule      MINOXIDIL FOR WOMEN EX " Topical compounded solution: contains spironolactone and minoxidil per patient  From [employer]      sucralfate (CARAFATE) 1 GM tablet Take 1 tablet by mouth 3 times daily      UBRELVY 100 MG tablet        Current Facility-Administered Medications   Medication Dose Route Frequency Provider Last Rate Last Admin    nitroFURantoin macrocrystal-monohydrate (MACROBID) capsule 100 mg  100 mg Oral Once Jose Amezcua MD        triamcinolone acetonide (KENALOG-10) injection 20 mg  20 mg Intra-Lesional Once Marlys Carbone MD          Past Medical/Surgical History:   There is no problem list on file for this patient.    No past medical history on file.    CC No referring provider defined for this encounter. on close of this encounter.

## 2024-07-26 NOTE — TELEPHONE ENCOUNTER
Writer received msg that pt scored 12 on phq9. Writer reached out to see if there was anything we could provide for pt. Pt declined needing any resources and states she has what she needs in regards to her situation.

## 2024-07-26 NOTE — NURSING NOTE
"Dermatology Rooming Note    Va Riddle's goals for this visit include:   Chief Complaint   Patient presents with    Hair Loss     Scalp sensitivity around her menstrual cycle \"it gets red and angry\"  She wears clip in extensions and wants to know if the metal clips are giving her a reaction     Kimberly Pulido, MARCY    "

## 2024-07-27 ENCOUNTER — VIRTUAL VISIT (OUTPATIENT)
Dept: DERMATOLOGY | Facility: CLINIC | Age: 44
End: 2024-07-27
Payer: COMMERCIAL

## 2024-07-27 DIAGNOSIS — L23.5 ALLERGIC DERMATITIS DUE TO OTHER CHEMICAL PRODUCT: ICD-10-CM

## 2024-07-27 DIAGNOSIS — L30.9 DERMATITIS: Primary | ICD-10-CM

## 2024-07-27 DIAGNOSIS — R20.8 DYSESTHESIA OF SCALP: ICD-10-CM

## 2024-07-27 DIAGNOSIS — L65.9 LOSS OF HAIR: ICD-10-CM

## 2024-07-27 PROCEDURE — 99214 OFFICE O/P EST MOD 30 MIN: CPT | Performed by: DERMATOLOGY

## 2024-07-27 RX ORDER — CLOBETASOL PROPIONATE 0.05 G/100ML
SHAMPOO TOPICAL
Qty: 118 ML | Refills: 4 | Status: SHIPPED | OUTPATIENT
Start: 2024-07-27

## 2024-07-27 NOTE — Clinical Note
7/27/2024       RE: Va Riddle  [address]  [place] [facility] [address]     Dear Colleague,    Thank you for referring your patient, Va Riddle, to the [facility] [place] at [facility]. Please see a copy of my visit note below.    [facility] Dermatology Note  Encounter Date: Jul 27, 2024  Telephone ([phone] ). Location of teledermatologist: [facility] [place]. Start time: 2:25. End time: ***.    Dermatology Problem List:  1. 1. Hair Loss, outside bx on 5/16/22 at  dermatology showed androgenetic alopecia vs dermal hypersensitivity   - Repeat bx 3/13/2023 showed sparse perivascular inflammation  - Hair Metrix 3/13/2023, 8/18/23  - Epidermal nerve biopsy 3/13/2023: no evidence of neuropathy  - Previous tx: ILK 7/10/23, topical spironolactone  - Current tx: minoxidil 2.5 mg PO daily (increased 2/20/24), clobetasol solution, allergen avoidance  - Future: dupixent (PA process started 2/20/24)  2. Allergic contact dermatitis  - Patch testing with Dr. 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 H/o BCC on b    ____________________________________________    Assessment & Plan:      {Diagnosesderm:281992}.   Assessment & Plan:   #  Non-scarring alopecia with ACD and significant scalp symptoms (tingling)  She feels like her hair loss and scalp symptoms are worsening since the last visit. We suspect this is in part secondary to a probable exposure to PPD especially given pinkness to the scalp on exam today. She did not get topical gabapetin after the last visit, so will start this today to attempt to control symptoms as well. She was noted to have an atopic predisposition and does have a history of random eczematous rashes on the extremities, so we discussed Dupixent today as a  "next step if topicals do not work. She has had significant hair regrowth with PO minoxidil which has plateaued so will increase this as well today. Interestingly, her symptoms flare the worst with menstrual periods. OCPs were previously stopped due to HTN.  - Adhere to CAMP list  - ILK (see procedure note)  - Start topical gabapentin 6% solution nightly  - Continue clobetasol soln PRN when symptoms are really bad  - Fine to continue Viviscal  - Increase PO minoxidil to 2.5 mg PO daily (shouldn't affect BP really at this dose)  - Future: Interdisciplinary discussion about going back on an oral contraceptives, Dupixent (PA process started)    - ***     # {Diagnosesder:583892}.   {kkplans:292640}   - ***     Procedures Performed:    None    Follow-up: {kkfollowup:012577}    {kkstaffinvolved:106940}    ***  ____________________________________________    CC: Hair Loss (Scalp sensitivity around her menstrual cycle \"it gets red and angry\"/She wears clip in extensions and wants to know if the metal clips are giving her a reaction)    HPI:  Ms. Va Riddle is a(n) 44 year old female who presents today {kknew/return:377452} for ***    Patient is otherwise feeling well, without additional skin concerns.    Labs Reviewed:  ***N/A    Physical Exam:  Vitals: There were no vitals taken for this visit.  SKIN: Teledermatology photos were reviewed; image quality and interpretability: ***acceptable. Image date: ***.  - ***  - No other lesions of concern on areas examined.     Medications:  Current Outpatient Medications   Medication Sig Dispense Refill    ALPRAZolam (XANAX) 0.5 MG tablet Take 1 tablet by mouth daily at 2 pm      amphetamine-dextroamphetamine (ADDERALL) 20 MG tablet Take 1 tablet by mouth      atenolol (TENORMIN) 25 MG tablet       clobetasol (TEMOVATE) 0.05 % external solution       dupilumab (DUPIXENT) 300 MG/2ML prefilled syringe Inject 2 mLs (300 mg) subcutaneously every 14 days 12 mL 1    gabapentin " 6 % SOLN solution APPLY 0.5 ML TO AFFECTED AREA ON SCALP DAILY AFTER SHAMPOOING ~ 6 PM. 60 mL 11    ketoconazole (NIZORAL) 2 % external shampoo APPLY TOPICALLY TO THE SCALP 2 TIMES A WEEK FOR 4 WEEKS. LEAVE ON FOR 5 MINUTES THEN RINSE OFF      linaclotide (LINZESS) 290 MCG capsule 1 capsule      MINOXIDIL FOR WOMEN EX Topical compounded solution: contains spironolactone and minoxidil per patient  From [employer]      sucralfate (CARAFATE) 1 GM tablet Take 1 tablet by mouth 3 times daily      UBRELVY 100 MG tablet        Current Facility-Administered Medications   Medication Dose Route Frequency Provider Last Rate Last Admin    nitroFURantoin macrocrystal-monohydrate (MACROBID) capsule 100 mg  100 mg Oral Once Jose Amezcua MD        triamcinolone acetonide (KENALOG-10) injection 20 mg  20 mg Intra-Lesional Once Marlys Carbone MD          Past Medical/Surgical History:   There is no problem list on file for this patient.    No past medical history on file.    CC No referring provider defined for this encounter. on close of this encounter.      Again, thank you for allowing me to participate in the care of your patient.      Sincerely,    Marlys Carbone MD

## 2024-10-06 ENCOUNTER — HEALTH MAINTENANCE LETTER (OUTPATIENT)
Age: 44
End: 2024-10-06

## 2024-11-18 ENCOUNTER — LAB (OUTPATIENT)
Dept: LAB | Facility: CLINIC | Age: 44
End: 2024-11-18
Payer: COMMERCIAL

## 2024-11-18 ENCOUNTER — OFFICE VISIT (OUTPATIENT)
Dept: DERMATOLOGY | Facility: CLINIC | Age: 44
End: 2024-11-18
Payer: COMMERCIAL

## 2024-11-18 VITALS — SYSTOLIC BLOOD PRESSURE: 127 MMHG | DIASTOLIC BLOOD PRESSURE: 88 MMHG

## 2024-11-18 DIAGNOSIS — L65.9 LOSS OF HAIR: Primary | ICD-10-CM

## 2024-11-18 DIAGNOSIS — L30.9 DERMATITIS: ICD-10-CM

## 2024-11-18 DIAGNOSIS — R20.8 DYSESTHESIA OF SCALP: ICD-10-CM

## 2024-11-18 DIAGNOSIS — L65.9 LOSS OF HAIR: ICD-10-CM

## 2024-11-18 LAB
ALBUMIN SERPL BCG-MCNC: 4.8 G/DL (ref 3.5–5.2)
ALP SERPL-CCNC: 54 U/L (ref 40–150)
ALT SERPL W P-5'-P-CCNC: 69 U/L (ref 0–50)
ANION GAP SERPL CALCULATED.3IONS-SCNC: 13 MMOL/L (ref 7–15)
AST SERPL W P-5'-P-CCNC: 70 U/L (ref 0–45)
BASOPHILS # BLD AUTO: 0 10E3/UL (ref 0–0.2)
BASOPHILS NFR BLD AUTO: 0 %
BILIRUB SERPL-MCNC: 0.9 MG/DL
BUN SERPL-MCNC: 16.7 MG/DL (ref 6–20)
CALCIUM SERPL-MCNC: 9.9 MG/DL (ref 8.8–10.4)
CHLORIDE SERPL-SCNC: 102 MMOL/L (ref 98–107)
CREAT SERPL-MCNC: 0.62 MG/DL (ref 0.51–0.95)
EGFRCR SERPLBLD CKD-EPI 2021: >90 ML/MIN/1.73M2
EOSINOPHIL # BLD AUTO: 0 10E3/UL (ref 0–0.7)
EOSINOPHIL NFR BLD AUTO: 0 %
ERYTHROCYTE [DISTWIDTH] IN BLOOD BY AUTOMATED COUNT: 12.5 % (ref 10–15)
FERRITIN SERPL-MCNC: 190 NG/ML (ref 6–175)
GLUCOSE SERPL-MCNC: 87 MG/DL (ref 70–99)
HCO3 SERPL-SCNC: 24 MMOL/L (ref 22–29)
HCT VFR BLD AUTO: 41 % (ref 35–47)
HGB BLD-MCNC: 14.2 G/DL (ref 11.7–15.7)
IMM GRANULOCYTES # BLD: 0 10E3/UL
IMM GRANULOCYTES NFR BLD: 0 %
IRON BINDING CAPACITY (ROCHE): 346 UG/DL (ref 240–430)
IRON SATN MFR SERPL: 40 % (ref 15–46)
IRON SERPL-MCNC: 138 UG/DL (ref 37–145)
LYMPHOCYTES # BLD AUTO: 2.7 10E3/UL (ref 0.8–5.3)
LYMPHOCYTES NFR BLD AUTO: 28 %
MCH RBC QN AUTO: 31.5 PG (ref 26.5–33)
MCHC RBC AUTO-ENTMCNC: 34.6 G/DL (ref 31.5–36.5)
MCV RBC AUTO: 91 FL (ref 78–100)
MONOCYTES # BLD AUTO: 0.4 10E3/UL (ref 0–1.3)
MONOCYTES NFR BLD AUTO: 4 %
NEUTROPHILS # BLD AUTO: 6.6 10E3/UL (ref 1.6–8.3)
NEUTROPHILS NFR BLD AUTO: 67 %
NRBC # BLD AUTO: 0 10E3/UL
NRBC BLD AUTO-RTO: 0 /100
PLATELET # BLD AUTO: 329 10E3/UL (ref 150–450)
POTASSIUM SERPL-SCNC: 3.3 MMOL/L (ref 3.4–5.3)
PROT SERPL-MCNC: 7.3 G/DL (ref 6.4–8.3)
RBC # BLD AUTO: 4.51 10E6/UL (ref 3.8–5.2)
SHBG SERPL-SCNC: 79 NMOL/L (ref 30–135)
SODIUM SERPL-SCNC: 139 MMOL/L (ref 135–145)
TSH SERPL DL<=0.005 MIU/L-ACNC: 1.82 UIU/ML (ref 0.3–4.2)
VIT D+METAB SERPL-MCNC: 39 NG/ML (ref 20–50)
WBC # BLD AUTO: 9.9 10E3/UL (ref 4–11)

## 2024-11-18 PROCEDURE — 83540 ASSAY OF IRON: CPT | Performed by: PATHOLOGY

## 2024-11-18 PROCEDURE — 84443 ASSAY THYROID STIM HORMONE: CPT | Performed by: PATHOLOGY

## 2024-11-18 PROCEDURE — 80053 COMPREHEN METABOLIC PANEL: CPT | Performed by: PATHOLOGY

## 2024-11-18 PROCEDURE — 84270 ASSAY OF SEX HORMONE GLOBUL: CPT | Performed by: DERMATOLOGY

## 2024-11-18 PROCEDURE — 82627 DEHYDROEPIANDROSTERONE: CPT | Performed by: DERMATOLOGY

## 2024-11-18 PROCEDURE — 85025 COMPLETE CBC W/AUTO DIFF WBC: CPT | Performed by: PATHOLOGY

## 2024-11-18 PROCEDURE — 83550 IRON BINDING TEST: CPT | Performed by: PATHOLOGY

## 2024-11-18 PROCEDURE — 99213 OFFICE O/P EST LOW 20 MIN: CPT | Performed by: DERMATOLOGY

## 2024-11-18 PROCEDURE — 82728 ASSAY OF FERRITIN: CPT | Performed by: PATHOLOGY

## 2024-11-18 PROCEDURE — 36415 COLL VENOUS BLD VENIPUNCTURE: CPT | Performed by: PATHOLOGY

## 2024-11-18 PROCEDURE — 82306 VITAMIN D 25 HYDROXY: CPT | Performed by: DERMATOLOGY

## 2024-11-18 PROCEDURE — 99000 SPECIMEN HANDLING OFFICE-LAB: CPT | Performed by: PATHOLOGY

## 2024-11-18 PROCEDURE — 84403 ASSAY OF TOTAL TESTOSTERONE: CPT | Performed by: DERMATOLOGY

## 2024-11-18 ASSESSMENT — PAIN SCALES - GENERAL: PAINLEVEL_OUTOF10: NO PAIN (0)

## 2024-11-18 NOTE — NURSING NOTE
Dermatology Rooming Note    Va Riddle's goals for this visit include:   Chief Complaint   Patient presents with    Hair Loss     Pt believes hair is still thinning. Scalp gets very sensitive during menstrual cycle and hair texture feels different, just started birth control to try to counteract. Pt read that her herniated discs in neck may be contributing to her hairloss, will be getting surgery for this soon. Would like an update on MCAS testing.     Amina Fishman, EMT

## 2024-11-18 NOTE — LETTER
Date:December 14, 2024      Provider requested that no letter be sent. Do not send.       [facility]

## 2024-11-18 NOTE — PROGRESS NOTES
Drospirenone and estetrol - first month  - given to try to stop periods    Prior to this irregular   With menses experienced hair loss  Texture is different    Chiari malformation type I (CMS-HCC) (Primary Dx);  DDD (degenerative disc disease), cervical;  Cervical stenosis of spinal canal;  Cervical myelopathy (CMS-HCC);  Tethered cord (CMS-HCC)     Most hair loss in the front    Botox about 2 months ago    Dupexin every 2 weeks    2.5 mg of minoxidil    1.2  1 cm  2-3 cm  6 cm  Fine fibers

## 2024-11-18 NOTE — PROGRESS NOTES
[facility] Dermatology Note  Encounter Date: Nov 18, 2024  Office Visit     Dermatology Problem List:  1. Dermatology Problem List:  1. Hair Loss, outside bx on 5/16/22 at  dermatology showed androgenetic alopecia vs dermal hypersensitivity   - Repeat bx 3/13/2023 showed sparse perivascular inflammation  - Hair Metrix 3/13/2023, 8/18/23  - Epidermal nerve biopsy 3/13/2023: no evidence of neuropathy  - Previous tx: ILK 7/10/23, topical spironolactone  - Current tx: minoxidil 2.5 mg PO daily (increased 2/20/24), clobetasol solution, allergen avoidance  - Future: dupixent (PA process started 2/20/24)  2. Allergic contact dermatitis  - Patch testing with Dr. 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 H/o BCC on bx 3/13/23  - Monitor    ____________________________________________    Assessment & Plan:     Assessment & Plan:   #  Non-scarring alopecia with ACD and significant scalp symptoms (tingling)  She feels like her hair loss and scalp symptoms are worsening since the last visit. We suspect this is in part secondary to a probable exposure to PPD especially given pinkness to the scalp on exam today. She did not get topical gabapetin after the last visit, so will start this today to attempt to control symptoms as well. She was noted to have an atopic predisposition and does have a history of random eczematous rashes on the extremities, so we discussed Dupixent today as a next step if topicals do not work. She has had significant hair regrowth with PO minoxidil which has plateaued so will increase this as well today. Interestingly, her symptoms flare the worst with menstrual periods. OCPs were previously stopped due to HTN.  - Adhere to CAMP list  - ILK (see procedure note)  - Start topical gabapentin 6% solution nightly  - Continue clobetasol soln PRN when symptoms are really bad  - Fine to continue  Viviscal  - Increase PO minoxidil to 2.5 mg PO daily (shouldn't affect BP really at this dose)  - Future: Interdisciplinary discussion about going back on an oral contraceptives, Dupixent (PA process started)       # ***.   {kkplans:925449}   - ***     # ***.   {kkplans:351248}   - ***     Procedures Performed:   HairMetrix: 3/13/23 - 8/18/23 -11/18/2024  - Frontal anterior scalp: 263 - 230 - 299  - Mid scalp: 311 - 292 - 339  - Vertex scalp: 266 - 276 - 296  - Occipital scalp: 217- 213 - 301  - Right temple: 191 - 185 - 212  - Left temple: 174 - 149 - 217    HairMetrix Summary (11/18/2024)    Frontal anterior (7.75 cm)    Mid scalp (12 cm)    Vertex (24 cm)    Occipital (30 cm)    Right temporal (8.5, 9.5 cm)    Left temporal (8, 8 cm)    Summary          Global [employer] Summary (11/18/2024)    Frontal    Superior    Vertex    Right temporal    Left temporal         {bsproceduresnotes:066605}    Follow-up: {kkfollowup:051656}    {kkstaffinvolved:852332}    ***  ____________________________________________    CC: Hair Loss (Pt believes hair is still thinning. Scalp gets very sensitive during menstrual cycle and hair texture feels different, just started birth control to try to counteract. Pt read that her herniated discs in neck may be contributing to her hairloss, will be getting surgery for this soon. Would like an update on MCAS testing./Pt experiencing dry skin all over body.)    HPI:  Ms. Va Riddle is a(n) 44 year old female who presents today {kknew/return:550375} for ***    Patient is otherwise feeling well, without additional skin concerns.     Labs Reviewed:  ***N/A    Physical Exam:  Vitals: /88 (BP Location: Right arm, Patient Position: Sitting)   SKIN: {kkSkinExam:817017}  - ***  - {Skin Exam Derm:229283}.   - {Skin Exam Derm:242001}.   - {Skin Exam Derm:310152}.   - No other lesions of concern on areas examined.     Medications:  Current Outpatient Medications   Medication Sig  Dispense Refill    ALPRAZolam (XANAX) 0.5 MG tablet Take 1 tablet by mouth daily at 2 pm      amphetamine-dextroamphetamine (ADDERALL) 20 MG tablet Take 1 tablet by mouth      atenolol (TENORMIN) 25 MG tablet       clobetasol (TEMOVATE) 0.05 % external solution       clobetasol propionate (CLOBEX) 0.05 % external shampoo Use 3 times per week - apply to a dry scalp, leave on for 15 minutes, then lather and rinse - do every other day ideally 118 mL 4    dupilumab (DUPIXENT) 300 MG/2ML prefilled syringe Inject 2 mLs (300 mg) subcutaneously every 14 days 12 mL 1    FLUoxetine (PROZAC) 20 MG capsule Take 20 mg by mouth daily.      gabapentin 6 % SOLN solution APPLY 0.5 ML TO AFFECTED AREA ON SCALP DAILY AFTER SHAMPOOING ~ 6 PM. 60 mL 11    ketoconazole (NIZORAL) 2 % external shampoo APPLY TOPICALLY TO THE SCALP 2 TIMES A WEEK FOR 4 WEEKS. LEAVE ON FOR 5 MINUTES THEN RINSE OFF      linaclotide (LINZESS) 290 MCG capsule 1 capsule      MINOXIDIL FOR WOMEN EX Topical compounded solution: contains spironolactone and minoxidil per patient  From [employer]      sucralfate (CARAFATE) 1 GM tablet Take 1 tablet by mouth 3 times daily      UBRELVY 100 MG tablet        Current Facility-Administered Medications   Medication Dose Route Frequency Provider Last Rate Last Admin    nitroFURantoin macrocrystal-monohydrate (MACROBID) capsule 100 mg  100 mg Oral Once Jose Amezcua MD        triamcinolone acetonide (KENALOG-10) injection 20 mg  20 mg Intra-Lesional Once Marlys Carbone MD          Past Medical History:   There is no problem list on file for this patient.    History reviewed. No pertinent past medical history.    CC Referred MD Jeremias  No address on file on close of this encounter.

## 2024-11-19 LAB — DHEA-S SERPL-MCNC: 49 UG/DL (ref 35–430)

## 2024-11-20 LAB
TESTOST FREE SERPL-MCNC: 0.11 NG/DL
TESTOST SERPL-MCNC: 11 NG/DL (ref 8–60)

## 2024-11-25 DIAGNOSIS — R89.9 ABNORMAL LABORATORY TEST RESULT: Primary | ICD-10-CM

## 2024-11-25 NOTE — PROGRESS NOTES
Reviewed results of recent blood tests -  ok but ferritin still elevated at stable level, potassium is just one point below the normal range at 3.3 but two liver tests are elevated - AST is 70 and ALT is 69. Will ask for tests to be rechecked and no alcohol for 2 to 3 days prior to blood draw.    Marlys Carbone MD

## 2025-01-10 ENCOUNTER — VIRTUAL VISIT (OUTPATIENT)
Dept: DERMATOLOGY | Facility: CLINIC | Age: 45
End: 2025-01-10
Attending: DERMATOLOGY

## 2025-01-10 DIAGNOSIS — L30.9 DERMATITIS: ICD-10-CM

## 2025-01-10 DIAGNOSIS — L65.9 LOSS OF HAIR: Primary | ICD-10-CM

## 2025-01-10 DIAGNOSIS — L23.5 ALLERGIC DERMATITIS DUE TO OTHER CHEMICAL PRODUCT: ICD-10-CM

## 2025-01-10 DIAGNOSIS — R20.8 DYSESTHESIA OF SCALP: ICD-10-CM

## 2025-01-10 PROCEDURE — 98014 SYNCH AUDIO-ONLY EST MOD 30: CPT | Performed by: DERMATOLOGY

## 2025-01-10 RX ORDER — CLOBETASOL PROPIONATE 0.05 G/100ML
SHAMPOO TOPICAL
Qty: 118 ML | Refills: 4 | Status: SHIPPED | OUTPATIENT
Start: 2025-01-10 | End: 2025-03-19

## 2025-01-10 NOTE — PATIENT INSTRUCTIONS
Discuss topical estradiol with your GYN physician. The goal would be to apply to the scalp before and during your periods.

## 2025-01-10 NOTE — PROGRESS NOTES
[facility] Dermatology Note  Encounter Date: Blane 10, 2025  Telephone ([phone] ). Location of teledermatologist: [facility] [place]. Start time: 7:15. End time: 7:29.    Dermatology Problem List:    1. Hair Loss, outside bx on 5/16/22 at  dermatology showed androgenetic alopecia vs dermal hypersensitivity   - Repeat bx 3/13/2023 showed sparse perivascular inflammation  - Hair Metrix 3/13/2023, 8/18/23  - Epidermal nerve biopsy 3/13/2023: no evidence of neuropathy  - Previous tx: ILK 7/10/23, topical spironolactone  - Current tx: minoxidil 2.5 mg PO daily (increased 2/20/24), clobetasol solution, allergen avoidance   Shampoos every other day, uses [employer] shampoo and conditioner and hair dye - notes dye only lasts a week - Future: dupixent (PA process started 2/20/24)  2. Allergic contact dermatitis  - Patch testing with Dr. 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 H/o BCC on b     ____________________________________________       Assessment & Plan:   #  Non-scarring alopecia with ACD and significant scalp symptoms (tingling)   She was prescribed topical gabapentin recently. She has been  noted to have an atopic predisposition and does have a history of random eczematous rashes on the extremities, so we have discussed dupexin. She has had significant hair regrowth with PO minoxidil which has plateaued so dose was increased to 2.5 mg daily at the last visit. Interestingly, her symptoms flare the worst with menstrual periods. OCPs were previously stopped due to HTN.  - Adhere to CAMP list  - ILK at last visit  - Started topical gabapentin 6% solution nightly  - Continue clobetasol soln PRN when symptoms are really bad  - Fine to continue Viviscal  - Add Clobex shampoo 3 times per week       Procedures Performed:   None    ____________________________________________      Staff:     Marlys Carbone MD  Professor   Department of Dermatology  [facility]    ____________________________________________    CC: No chief complaint on file.    HPI:  Ms. Va Riddle is a(n) 44 year old female who presents today as a return patient for scalp hair loss.    Interim updates   Stopped birth control pill through November and felt better but experienced bleeding and now interested in trying progesterone.  Does still use estradiol cream twice a week.  Feeling much better after surgery for Chiari malformation type I (CMS-HCC) -     Patient is otherwise feeling well, without additional skin concerns.    Labs Reviewed:  NA    Physical Exam:  Vitals: There were no vitals taken for this visit.  SKIN: Teledermatology photos were reviewed; image quality and interpretability: acceptable. Image date: January 8, 2025.  - Decreased density in the parietal regions  - Fine fibers along the frontal hair line  -Part width looks to be 1.2  - No other lesions of concern on areas examined.     Medications:  Current Outpatient Medications   Medication Sig Dispense Refill    ALPRAZolam (XANAX) 0.5 MG tablet Take 1 tablet by mouth daily at 2 pm      amphetamine-dextroamphetamine (ADDERALL) 20 MG tablet Take 1 tablet by mouth      atenolol (TENORMIN) 25 MG tablet       clobetasol (TEMOVATE) 0.05 % external solution       clobetasol propionate (CLOBEX) 0.05 % external shampoo Use 3 times per week - apply to a dry scalp, leave on for 15 minutes, then lather and rinse - do every other day ideally 118 mL 4    dupilumab (DUPIXENT) 300 MG/2ML prefilled syringe Inject 2 mLs (300 mg) subcutaneously every 14 days. 12 mL 1    FLUoxetine (PROZAC) 20 MG capsule Take 20 mg by mouth daily.      gabapentin 6 % SOLN solution APPLY 0.5 ML TO AFFECTED AREA ON SCALP DAILY AFTER SHAMPOOING ~ 6 PM. 60 mL 11    ketoconazole (NIZORAL) 2 % external shampoo APPLY  TOPICALLY TO THE SCALP 2 TIMES A WEEK FOR 4 WEEKS. LEAVE ON FOR 5 MINUTES THEN RINSE OFF      linaclotide (LINZESS) 290 MCG capsule 1 capsule      MINOXIDIL FOR WOMEN EX Topical compounded solution: contains spironolactone and minoxidil per patient  From [facility]      sucralfate (CARAFATE) 1 GM tablet Take 1 tablet by mouth 3 times daily      UBRELVY 100 MG tablet        Current Facility-Administered Medications   Medication Dose Route Frequency Provider Last Rate Last Admin    nitroFURantoin macrocrystal-monohydrate (MACROBID) capsule 100 mg  100 mg Oral Once Jose Amezcua MD        triamcinolone acetonide (KENALOG-10) injection 20 mg  20 mg Intra-Lesional Once Marlys Carbone MD          Past Medical/Surgical History:   There is no problem list on file for this patient.    No past medical history on file.

## 2025-01-10 NOTE — NURSING NOTE
Dermatology Rooming Note    Va Riddle's goals for this visit include:   Chief Complaint   Patient presents with    Derm Problem     Stable condition per patient  Had surgery on 12/2/24, spot on top left has been burning a lot since then     Kimberly Pulido LPN

## 2025-02-10 ENCOUNTER — TELEPHONE (OUTPATIENT)
Dept: DERMATOLOGY | Facility: CLINIC | Age: 45
End: 2025-02-10
Payer: COMMERCIAL

## 2025-02-10 NOTE — TELEPHONE ENCOUNTER
2/10 Patient confirmed scheduled appointment:  Date: 9/5/25  Time: 7:40am  Visit type: Return Dermatology  Provider: Tona  Location: [facility]  Testing/imaging: na  Additional notes: na

## 2025-03-02 DIAGNOSIS — L65.9 LOSS OF HAIR: Primary | ICD-10-CM

## 2025-03-06 NOTE — TELEPHONE ENCOUNTER
Last Written Prescription:  MINOXIDIL FOR WOMEN EX -- --  -- No   Sig - Route: Topical compounded solution: contains spironolactone and minoxidil per patient  From [facility] - Apply externally   Class: Historical   Order: [id number]     ----------------------  Last Visit Date: 1/10/25  Future Visit Date: 9/5/25  ----------------------        Refill decision: Medication unable to be refilled by RN due to: Medication - Last script is Reported/Historical/Transitional        Request from pharmacy:  Requested Prescriptions   Pending Prescriptions Disp Refills    minoxidil (LONITEN) 2.5 MG tablet [Pharmacy Med Name: MINOXIDIL TABS 2.5MG]  0       There is no refill protocol information for this order

## 2025-03-13 DIAGNOSIS — L30.9 DERMATITIS: ICD-10-CM

## 2025-03-13 RX ORDER — MINOXIDIL 2.5 MG/1
TABLET ORAL
Qty: 90 TABLET | Refills: 1 | Status: SHIPPED | OUTPATIENT
Start: 2025-03-13

## 2025-03-16 ENCOUNTER — HEALTH MAINTENANCE LETTER (OUTPATIENT)
Age: 45
End: 2025-03-16

## 2025-03-19 RX ORDER — CLOBETASOL PROPIONATE 0.05 G/100ML
SHAMPOO TOPICAL
Qty: 118 ML | Refills: 0 | Status: SHIPPED | OUTPATIENT
Start: 2025-03-19

## 2025-03-19 NOTE — TELEPHONE ENCOUNTER
Disp Refills Start End KATHY   clobetasol propionate (CLOBEX) 0.05 % external shampoo 118 mL 4 1/10/2025 -- No   Sig: Use 3 times per week - apply to a dry scalp, leave on for 15 minutes, then lather and rinse - do every other day ideally     Remaining refills sent to requested pharmacy.

## 2025-04-13 DIAGNOSIS — T78.40XD ALLERGIC REACTION, SUBSEQUENT ENCOUNTER: Primary | ICD-10-CM

## 2025-04-13 RX ORDER — EPINEPHRINE 0.3 MG/.3ML
0.3 INJECTION SUBCUTANEOUS PRN
Qty: 2 EACH | Refills: 0 | Status: SHIPPED | OUTPATIENT
Start: 2025-04-13

## 2025-04-13 NOTE — PROGRESS NOTES
See nursing notes regarding the reaction Va has had to a hair mask she applied recently.  She has several questions and has asked for an epi pen. I will order but concurrently will ask that she be seen by her primary care provider for any additional requests.      Will also ask that she be seen by Dr. Jose Amezcua again to insure follow up with [facility] in regards to her negative reaction to the mask she applied.    Marlys Carbone MD

## 2025-04-14 DIAGNOSIS — L30.9 DERMATITIS: ICD-10-CM

## 2025-04-18 NOTE — TELEPHONE ENCOUNTER
Last Written Prescription:  clobetasol propionate (CLOBEX) 0.05 % external shampoo             Summary: Sig: Use 3 times per week - apply to a dry scalp, leave on for 15 minutes, then lather and rinse - do every other day ideally Disp-118 mL, R-0 E-Prescribe  Start: 03/19/2025Ord/Sold: 03/19/2025 (O)Ordered On: 03/19/2025[facility] [address]: Taking: Long-term: Med Note:              Patient Sig: Sig: Use 3 times per week - apply to a dry scalp, leave on for 15 minutes, then lather and rinse - do every other day ideally  Ordering Department: [facility] DER[facility]  Authorized By: Marlys Carbone MD  Dispense: 118 mL  Refills: 0 ordered       ----------------------  Last Visit Date: 1/10/25 Iliana WHALEY  Future Visit Date: 9/5/25  ----------------------      Refill decision: Medication refilled per  Medication Refill in Ambulatory Care  policy.   []  If no future appointment scheduled: Route to Clinic Coordinators to contact the pt for appointment.      Refill decision: Medication unable to be refilled by RN due to: Other:  Failed refill protocol/ Process 3, > 3 months since last seen    Sarai GUERRERO RN  [facility] Nursing/Red Flag Triage & Med Refill Team  Request from pharmacy:  Requested Prescriptions   Pending Prescriptions Disp Refills    clobetasol propionate (CLOBEX) 0.05 % external shampoo [Pharmacy Med Name: CLOBETASOL PROP SHAMPOO 118ML 0.05%]  0       There is no refill protocol information for this order

## 2025-04-20 RX ORDER — CLOBETASOL PROPIONATE 0.05 G/100ML
SHAMPOO TOPICAL
Qty: 118 ML | Refills: 3 | Status: SHIPPED | OUTPATIENT
Start: 2025-04-20

## 2025-04-29 ENCOUNTER — VIRTUAL VISIT (OUTPATIENT)
Dept: PHARMACY | Facility: CLINIC | Age: 45
End: 2025-04-29
Attending: DERMATOLOGY
Payer: COMMERCIAL

## 2025-04-29 DIAGNOSIS — L50.8 CHRONIC URTICARIA: ICD-10-CM

## 2025-04-29 DIAGNOSIS — L30.9 DERMATITIS: Primary | ICD-10-CM

## 2025-04-29 DIAGNOSIS — L65.9 NON-SCARRING ALOPECIA: ICD-10-CM

## 2025-04-29 RX ORDER — CLOBETASOL PROPIONATE 0.05 G/100ML
SHAMPOO TOPICAL
Qty: 354 ML | Refills: 3 | Status: SHIPPED | OUTPATIENT
Start: 2025-04-29

## 2025-04-29 RX ORDER — KETOCONAZOLE 20 MG/ML
SHAMPOO, SUSPENSION TOPICAL DAILY PRN
Qty: 120 ML | Refills: 2 | Status: SHIPPED | OUTPATIENT
Start: 2025-04-29

## 2025-04-29 NOTE — Clinical Note
Jairo Carbone - had a follow-up on Dupixent with this patient. Overall dermatitis is continues to be improved on Dupixent. Appears you are aware of recent allergic reaction to PPD in hair mask - has been using clobetasol shampoo which is helping & filled the EpiPen you sent. Insurance is changing 5/1/25 so I'll work with patient to get Dupixent covered again   Elena

## 2025-04-29 NOTE — PROGRESS NOTES
Medication Therapy Management (MTM) Encounter    ASSESSMENT:                            Non-scarring Alopecia/Dermatitis/Chronic Urticaria:   Chronic dermatitis and urticaria continues to be improved on Dupixent.  Tolerating without side effects.  Would benefit from continuing Dupixent 300 mg every 14 days.  Discussed she should not use Dupixent pens that were frozen; advised to contact [employer] support to determine if they may be able to replace pens.  Will need new PA when insurance activates and reviewed potential options to meet deductible.  Due to recent allergic reaction reviewed she may utilize clobetasol shampoo more frequently until symptoms resolve as directed by Dr. Carbone.    PLAN:                            Continue Dupixent 300 mg every 14 days     Patient to call [employer] Support (P#: [phone]) to request replacement of frozen pens     Patient to provide new insurance information once obtained and will need an urgent PA for Dupixent as next dose is due 5/12/2025 (preference for 84 vs 28 day supply if this is an option)   Should determine if manufacture co-pay cards apply towards deductible or may consider post-transaction rebate (sent email with further information)    Follow-up: with me (Elena Alvarado, [facility]) for an MTM follow up appointment by phone/video in around 6 months (10/29/25) to review response to Dupixent    SUBJECTIVE/OBJECTIVE:                          Va Riddle is a 44 year old female called for a follow-up visit.      Reason for visit: Dupixent follow-up     Medication Adherence/Access:   Reports her current insurance plans ends on 4/30 and new insurance activates on 5/1/25 (does not have new insurance card info yet)  Dupixent coverage:   - PA approved through 7/15/25 - needs new PA   - Fills at [facility]   Gabapentin 6% solution coverage:   - PA denied. Need to purchase out of pocket   - Available at [facility] for $64 for 60 mL      Non-scarring Alopecia/Dermatitis/Chronic Urticaria:   - Dupixent (dupilumab) 300 mg every 14 days (started ~3/20/24)  - Oral minoxidil 2.5 mg daily (increased dose in Feb 2024)   - Ketoconazole 2% shampoo as needed  - Intralesional steroid injections around 1-2 months   - Clobetasol 0.05% solution as needed for severe flares in dermatitis   Side effects: none reported     Patient reports chronic flares in dermatitis continue to be improved on Dupixent. Recently developed rash on scalp along with severe allergic reaction to PPD (identified in hair mask) - had to administer EpiPen at home; prescriber aware & refilled EpiPen (has at home). Has had to increase use of clobetasol shampoo to temporarily alleviate symptoms - finds this helps. Requests refills of clobetasol shampoo and ketoconazole shampoo.     Patient shares her daughter mistakenly placed Dupixent shots in freezer; would like to know how she could get these replaced.  Last dose given on 4/28/2025; no additional doses at home right now.    Specialist: Dr. Marlys Carbone MD, Dermatology. Last visit on 1/10/25.     Specialist: Dr. Jose Amezcua, Derm-Allergy. Last visit 8/18/23.    Previous treatment: has tried numerous treatments for dermatitis without success.   - Topical steroids: Betamethasone; triamcinolone, clobetasol  - Oral prednisone tapers as needed for acute flares (last taken Dec 2023)   - montelukast 10 mg daily (for >3 months): not effective    - Antihistamines: cetrizine, fexofenadine, loratadine, diphenhydramine (all for >3 months, not effective)    - tazarotene 0.1% cream   - oral antibiotics: clindamycin 300 mg BID, cephalexin 500 mg TID, metronidazole 500 mg twice daily  - Xolair (omalizumab) 300 mg every 3 weeks (03/2023-05/2024): pt self-stopped given response to Dupixent, no flares in urticaria with stopping treatment     Allergies/ADRs: Reviewed in chart  Past Medical History: Reviewed in chart  Tobacco: She reports that she has  never smoked. She has never used smokeless tobacco.  ----------------    I spent 20 minutes with this patient today. All changes were made via collaborative practice agreement with Marlys Carbone.     A summary of these recommendations was sent via email as requested by patient     Elena Alvarado, PharmD  Medication Therapy Management Pharmacist   [facility]     Telemedicine Visit Details  The patient's medications can be safely assessed via a telemedicine encounter.  Type of service:  Telephone visit  Originating Location (pt. Location): Home    Distant Location (provider location):  Off-site  Start Time:  2:00pm  End Time:  2:20pm     Medication Therapy Recommendations  No medication therapy recommendations to display

## 2025-04-30 NOTE — PATIENT INSTRUCTIONS
Recommendations from today's MTM visit:                                                      Continue Dupixent 300 mg every 14 days    Call [employer] Support at P#: [phone]. You can explain the situation with frozen Dupixent pens and see if they will send you replacements for these. Let me know if they need a verbal prescription from Dr. Carbone and I can handle this.     Once you get your new insurance please send this information to me right away, so that I can get a prior authorization submitted for coverage.     Confirm if  copay/savings cards apply towards your insurance deductible.    If copay card does apply towards deductible: then you can use the Dupixent Copay Savings Card as normal to cover any costs of your Dupixent fills and we do not need to worry about you exhausting these funds in 2025    If copay card does not apply towards deductible: then you can either pay for this yourself or attempt post-transaction rebate (see instructions below)     Post-transaction Rebate Instructions:   If you are already enrolled in [employer]'s Copay Savings Card: You should contact [employer] at #: [phone] & confirm the amount of funds on your Dupixent co-pay card. You will also need all the card information (ID#, BIN#, Group #, PCN)  The funds on the Dupixent Copay Card are what you will being reimbursed with. If you do not have sufficient funds for what you are paying the pharmacy then you won't be fully reimbursed.     If you are not currently enrolled in [employer] Copay Savings: You can sign-up at the link below. They will give you $10,000 annually to apply towards your copay cost of Dupixent.     [email or web address]     To submit for Post-transaction Rebate: you will need to ask your specialty pharmacy to only run the Dupixent claim through your insurance. You will be responsible for paying the cost that insurance does not cover with a personal  debit or credit card (you cannot use an FSA or HSA card for rebate)     You should only need to do this for your first 1-2 refills until you meet your out-of-pocket deductible with insurance. Then you can have them add the Dupixent Copay Card back on your account to cover any smaller copay cost moving forward     After you've paid for the Dupixent. You will need to submit for post-transaction rebate online at the portal below:     Info you'll likely need to submit for rebate:   Dupixent Copay Card information   Prescription insurance card (front & back)  Prescription label from pharmacy  Prescription receipt from pharmacy (with breakdown of cost)     Dupixent Rebate Portal: [email or web address]     Follow-up: with me (Elena Alvarado [facility]) for an MTM follow up appointment by phone/video in around 6 months (10/29/25) to review response to Dupixent    My Clinical Pharmacist's contact information:                                                      Please feel free to contact me with any questions or concerns you have.      Elena Alvarado, PharmD  MTM Pharmacist  [facility]

## 2025-05-05 ENCOUNTER — TELEPHONE (OUTPATIENT)
Dept: DERMATOLOGY | Facility: CLINIC | Age: 45
End: 2025-05-05
Payer: COMMERCIAL

## 2025-05-05 NOTE — TELEPHONE ENCOUNTER
5/5 Patient confirmed scheduled appointment:  Date: 9/12/25  Time: 12pm  Visit type: Return Dermatology  Provider: Tona  Location: [facility]  Testing/imaging: na  Additional notes: na

## 2025-06-06 DIAGNOSIS — L30.9 DERMATITIS: ICD-10-CM

## 2025-06-09 RX ORDER — DUPILUMAB 300 MG/2ML
INJECTION, SOLUTION SUBCUTANEOUS
Qty: 12 ML | Refills: 1 | OUTPATIENT
Start: 2025-06-09

## 2025-07-07 NOTE — PROGRESS NOTES
[facility] Dermato-allergology Note  Virtual visit: store and forward video ([employer] connected), start time: 12:10 pm, end time: 12:40 pm, date of images: none  Encounter Date: Jul 8, 2025    Virtual Visit Details  Type of service:  Video Visit   Originating Location (pt. Location): Home    Distant Location (provider location):  On-site  Platform used for Video Visit: [facility]   ____________________________________________    CC: Allergy Testing Followup (Video visit ([facility]) recent anaphylactic reaction to possibly a hair mask. Needing a new epi pen set. Had 3 throat closings in 1 week span. )      HPI:  (Jul 8, 2025)  Ms. Va Riddle is a(n) 45 year old female who presents today as a return patient for allergy consultation  - Follow-up in Derm-Allergy clinic if necessary ==> sees today Dr. Carbone   - Started using a tinted hair mask  - For the first week she used it a couple times and thought she was getting sick (runny nose, coughing, losing voice)  - Happened pretty quickly   - This resolved but then came back after using the hair mask on Monday  - When she got home that day, she coughed and then immediately had throat closing   - scalp redness with metal clips   - scalp also burns when she is on her menstrual cycle   - Otherwise feeling well in usual state of health    Physical Exam:  General: In no acute distress, well-developed, well-nourished  Eyes: no conjunctivitis  ENT: no signs of rhinitis   Pulmonary: no wheezing or coughing  Skin:Focused examination of the skin on test sites was performed = see test results below    Earlier History and Allergy Exams:  (Aug 18, 2023)  Presents today as a return patient for allergy consultation  - Follow-up in Derm-Allergy clinic for 2nd readings and final conclusions after 4 days       (Aug 16, 2023)  - Follow-up in Derm-Allergy clinic for 1st readings of patch tests after 2 days       (Aug 14, 2023)  - Follow-up in Derm-Allergy  clinic for allergy tests as planned (has to be off systemic antihistamines for about 1 week --> topical or inhaled steroids OK as long not on the back)   On the entire back colorful tattoo, have to test over it     (May 15, 2023)  2 different kinds of rash.  First rash, maybe started ~7 years ago. Describes as scaly brown/pink dots on chest and backs. 5 years ago, became particularly bothersome, would have to wear different shirts and found herself having to cover rash up.. The lesions became angry and spread, recently has spread up/down to legs and all over back. Went to local dermatologist, thought it was dermatitis. Has been treated with cortisone, betamethasone, tretinoin, triamcinolone, different eczema lotions. Has tried Singulair, claritin, benadryl (currently itches with benadryl now). Started Xolair which initially seemed to suppress ~70% of the rash, however has become less efficacious over time and rash has not resolved. Dad and nieces/nephews have asthma. 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 Has had allergy testing in the past several years ago and received allergy shots.    Also has another rash, describes as big, red/purple, splotchy swelling typically localized in upper body up to neck. Feels prickly/staticky prior to onset. This rash tends to be triggered by heat, eating anything warm or in sun, emotional stimulus, high BP. It comes and goes, can happen up to several times a day. Cold water helps with rash. Does not improve with allergy medicines. Gets tickle in back of throat, won't go away.    Dad, not 100% sure is dad, pretty significant skin stuff. Liver spots. Warts, patches.    Laundry detergent: Gain, has used all different kinds; no difference, no itching when put clothes on. Bleach slightly reactive. Soaps: bodywash dove sensitive, Aveeno oatmeal kind, whatever body washes. Didn't find too much. Does use spray tan pretty frequently; doesn't see  much of a difference. Bumps do become more prominent after spray tan.      Past Medical History:   There is no problem list on file for this patient.    No past medical history on file.    Allergies:  Allergies   Allergen Reactions    Metoclopramide Hives     Also felt edgy and claustrophobic along with feeling itchy everywhere.  Doesn't ever want it again IV or oral    Nitrofurantoin Dermatitis     Mucositis and delayed type reaction  >> because reaction to Nitrofurantoin was recent and more delayed type reaction I would recommend to avoid it despite negative skin tests         Medications:  Current Outpatient Medications   Medication Sig Dispense Refill    ALPRAZolam (XANAX) 0.5 MG tablet Take 1 tablet by mouth daily at 2 pm      amphetamine-dextroamphetamine (ADDERALL) 20 MG tablet Take 1 tablet by mouth      atenolol (TENORMIN) 25 MG tablet       clobetasol (TEMOVATE) 0.05 % external solution       clobetasol propionate (CLOBEX) 0.05 % external shampoo Apply to a dry scalp, leave on for 15 minutes, then lather and rinse - do up to 3 times per week as tolerated. 354 mL 3    DUPIXENT 300 MG/2ML prefilled syringe INJECT 300 MG (2 ML) UNDER THE SKIN EVERY 14 DAYS 12 mL 1    EPINEPHrine (ANY BX GENERIC EQUIV) 0.3 MG/0.3ML injection 2-pack Inject 0.3 mLs (0.3 mg) into the muscle as needed for anaphylaxis. May repeat one time in 5-15 minutes if response to initial dose is inadequate. 2 each 0    FLUoxetine (PROZAC) 20 MG capsule Take 20 mg by mouth daily.      gabapentin 6 % SOLN solution APPLY 0.5 ML TO AFFECTED AREA ON SCALP DAILY AFTER SHAMPOOING ~ 6 PM. (Patient not taking: Reported on 1/10/2025) 60 mL 11    ketoconazole (NIZORAL) 2 % external shampoo Apply topically daily as needed for itching or irritation. 120 mL 2    linaclotide (LINZESS) 290 MCG capsule 1 capsule      minoxidil (LONITEN) 2.5 MG tablet One tablet daily as tolerated 90 tablet 1    MINOXIDIL FOR WOMEN EX Topical compounded solution: contains  spironolactone and minoxidil per patient  From [facility]      UBRELVY 100 MG tablet        Current Facility-Administered Medications   Medication Dose Route Frequency Provider Last Rate Last Admin    triamcinolone acetonide (KENALOG-10) injection 20 mg  20 mg Intra-Lesional Once Marlys Carbone MD           Social History:  The patient  for [employer], [employer] and [employer]. Patient has the following hobbies or non-occupational exposure work out, dance, play with kids, ride motorcycles    Family History:  Family History   Problem Relation Age of Onset    Melanoma Mother     Skin Cancer Maternal Uncle        Previous Labs, Allergy Tests, Dermatopathology, Imaging:    Previous TRUE Test: 3/1/23:   1+ reactions:   - Nickel sulfate  Paraben Mix  Thimersal   Allergens showing irritant reactions  2-bromo-2-nitropropene-1,3-diol    Referred By: No referring provider defined for this encounter.     Allergy Tests:    Past Allergy Test    Order for Future Allergy Testing:    [x] Outpatient  [] Inpatient: [facility]..../ Bed ....       Skin Atopy (atopic dermatitis) [x] Yes   [] No local derm says she has eczema  Contact allergies:   [x] Yes   [] No ..........   Hand eczema:   [x] Yes   [] No goldbond eczema helps, look crepey           Leading hand:   [] R   [] L       [] Ambidextrous         Drug allergies:        [x] Yes   [] No  Which? Extreme hives turned into SJS nitrofurantoin  Urticaria/Angioedema  [x] Yes   [] No .........  Food Allergy:  [] Yes   [x] No  which?......  Pets :  [x] Yes   [] No  Which?1 dog         [x]  Rhinitis   [x] Conjunctivitis   [x] Sinusitis   [] Polyposis   [x] Otitis (fluid/discharge behind ears)   [] Pharyngitis         [x]  Postnasal drip    []  none  Operations:   [] Tonsils   [] Septum   [] Sinus   [] Polyposis        [] Asthma bronchiale   [x] Coughing      []  none  Symptoms (mostly Rhinoconjunctivitis and Asthma) aggravated by:  Season    [] I   [] II   [] III   [x] IV   [x]V   [x]VI   []VII   []VIII   []IX   []X   []XI   []XII     [x] perennial   Day time      [] morning   [] noon      [] evening        [x] night    [x] whole day........  []  none  Location/changes    [] inside        [] outside   [] mountains    [] sea     [] others.............   [x]  none  Triggers, specific     [] animals     [] plants     [] dust              [] others ...........................    [x]  none  Triggers, others       [] work          [] psyche    [] sport            [] others .............................  [x]  none  Irritant                [] phys efforts [] smoke    [] heat/cold     [] odors  [x]others. Air turn on . []  none    Order for PATCH TESTS  Reason for tests (suspected allergy): eczematous rash on trunk and extremities and scalp = ACD?  Known previous allergies: had true test in the past borderline pos to Nickel, Parabens. Thimerosal  Standardized panels  [x] Standard panel (40 tests)  [x] Preservatives & Antimicrobials (31 tests)  [x] Emulsifiers & Additives (25 tests)   [x] Perfumes/Flavours & Plants (25 tests)  [x] Hairdresser panel (12 tests)  [] Rubber Chemicals (22 tests)  [x] Plastics (26 tests)  [] Colorants/Dyes/Food additives (20 tests)  [] Metals (implants/dental) (24 tests)  [] Local anaesthetics/NSAIDs (13 tests)  [x] Antibiotics & Antimycotics (14 tests)   [] Corticosteroids (15 tests)   [] Photopatch test (62 tests)   [] others: ...      [x] Patient's own products: .spray tan  DO NOT test if chemical or biological identity is unknown!   always ask from patient the product information and safety sheets (MSDS)       Order for PRICK TESTS    Reason for tests (suspected allergy): atopy?  Known previous allergies: had IT at home and now tests negatives?    Standardized prick panels  [x] Atopic panel (20 tests)  [] Pediatric Panel (12 tests)  [] Milk, Meat, Eggs, Grains (20 tests)   [] Dust, Epithelia, Feathers (10 tests)  [] Fish, Seafood,  Shellfish (17 tests)  [] Nuts, Beans (14 tests)  [] Spice, Vegetable, Fruit (17 tests)  [] Pollen Panel = Tree, Grass, Weed (24 tests)  [] Others: ...      [] Patient's own products: ...  DO NOT test if chemical or biological identity is unknown!   always ask from patient the product information and safety sheets (MSDS)     Standardized intradermal tests  [x] Penicillium notatum [x] Aspergillus fumigatus [x] House dust mites D.far & D. pteron  [] Cat    [] dog  [] Others: ...  [] Bee venom   [] Wasp venom  !!Specific protocol with dilutions!!       Order for Drug allergy tests (prick & Intradermal & patch tests)    [] Penicillin G  [] Ampicillin [x] Cefazolin   [x] Ceftriaxone   [] Ceftazidime  [x] Bactrim    [x] Others: Nitrofurantoin  Order for ... as test date    ________________________________  RESULTS & EVALUATION of PATCH TESTS    Aug 14, 2023 application of patch tests:    Patch test readings after     [x] 2 days, [] 3 days [x] 4 days, [] 5 days,  Other duration: ...    STANDARD Series                                          # Substance 2 days 4 days remarks     1 Francisco Mix [C] - -       2 Colophony - -       3  2-Mercaptobenzothiazole  - -       4 Methylisothiazolinone - -       5 Carba Mix - -       6 Thiuram Mix [A] - -       7 Bisphenol A Epoxy Resin - -       8 D-Ygnj-Agmfuisflho-Formaldehyde Resin - -       9 Mercapto Mix [A] - -       10 Black Rubber Mix- PPD [B] - -       11 Potassium Dichromate  -  -       12 Balsam of [place] (Myroxylon Pereirae Resin) - -       13 Nickel Sulphate Hexahydrate - -       14 Mixed Dialkyl Thiourea - -       15 Paraben Mix [B] - -       16 Methyldibromo Glutaronitrile - -       17 Fragrance Mix 8% - -       18 2-Bromo-2-Nitropropane-1,3-Diol (Bronopol) CT - -       19 Lyral - -       20 Tixocortol-21- Pivalate CT - -       21 Diazolidinyl urea (Germall II) - -        22 Methyl Methacrylate - -       23 Cobalt (II) Chloride Hexahydrate - -       24 Fragrance Mix II  - -        25 Compositae Mix - -       26 Benzoyl Peroxide - -       27 Bacitracin - -       28 Formaldehyde - -       29 Methylchloroisothiazolinone / Methylisothiazolinone - -       30 Corticosteroid Mix CT - -       31 Sodium Lauryl Sulfate - -       32 Lanolin Alcohol - -       33 Turpentine - -       34 Cetylstearylalcohol - -       35 Chlorhexidine Dicluconate - -       36 Budesonide - -       37 Imidazolidinyl Urea  - -       38 Ethyl-2 Cyanoacrylate - -       39 Quaternium 15 (Dowicil 200) - -       40 Decyl Glucoside - -      PRESERVATIVES & ANTIMICROBIALS        # Substance 2 days 4 days remarks   41 1  1,2-Benzisothiazoline-3-One, Sodium Salt - -     2  1,3,5-Martinez (2-Hydroxyethyl) - Hexahydrotriazine (Grotan BK) - -     3 8-Fvzwvuxaaljxe-6-Nitro-1, 3-Propanediol NA NA     4  3, 4, 4' - Triclocarban - -    45 5 4 - Chloro - 3 - Cresol - -     6 4 - Chloro - 4 - Xylenol (PCMX) - -     7 7-Ethylbicyclooxazolidine (Bioban [id number]) - -     8 Benzalkonium Chloride CT - -     9 Benzyl Alcohol - -    50 10 Cetalkonium Chloride - -     11 Cetylpyrimidine Chloride  - -     12 Chloroacetamide - -     13 DMDM Hydantoin - -     14 Glutaraldehyde - -    55 15 Triclosan - -     16 Glyoxal Trimeric Dihydrate - -     17 Iodopropynyl Butylcarbamate - -     18 Octylisothiazoline - -     19 Bithionol CT - -    60 20 Bioban P 1487 (Nitrobutyl) Morpholine/(Ethylnitro-Trimethylene) Dimorpholine - -     21 Phenoxyethanol - -     22 Phenyl Salicylate - -     23 Povidone Iodine - -     24 Sodium Benzoate - -    65 25 Sodium Disulfite - -     26 Sorbic Acid - -     27 Thimerosal - -     28 Melamine Formaldehyde Resin - -     29 Ethylenediamine Dihydrochloride - -      Parabens      70 30 Butyl-P-Hydroxybenzoate - -     31 Ethyl-P-Hydroxybenzoate - -     32 Methyl-P-Hydroxybenzoate - -    73 33 Propyl-P-Hydroxybenzoate - -     EMULSIFIERS & ADDITIVES       # Substance 2 days 4 days remarks   74 1 Polyethylene Glycol-400 - -    75 2  Cocamidopropyl Betaine - -     3 Amerchol L101 - -     4 Propylene Glycol - -     5 Triethanolamine - -     6 Sorbitane Sesquiolate CT - -    80 7 Isopropylmyristate - -     8 Polysorbate 80 CT - -     9 Amidoamine   (Stearamidopropyl Dimethylamine) - -     10 Oleamidopropyl Dimethylamine - -     11 Lauryl Glucoside - -    85 12 Coconut Diethanolamide  - -     13 2-Hydroxy-4-Methoxy Benzophenone (Oxybenzone) - -     14 Benzophenone-4 (Sulisobenzon) - -     15 Propolis - -     16 Dexpanthenol - -    90 17 Abitol - -     18 Tert-Butylhydroquinone - -     19 Benzyl Salicylate - -     20 Dimethylaminopropylamin (DMPA) - -     21 Zinc Pyrithione (Zinc Omadine)  - -    95 22 Martinez(Hydroxymethyl) Nitromethane  - -      Antioxidant       23 Dodecyl Gallate - -     24 Butylhydroxyanisole (BHA) - -     25 Butylhydroxytoluene (BHT) - -     26 Di-Alpha-Tocopherol (Vit E) - -    100 27 Propyl Gallate - -     PERFUMES, FLAVORS & PLANTS        # Substance 2 days 4 days remarks   101 1 Benzyl Cinnamate - -     2 Di-Limonene (Dipentene) - -     3 Cananga Odorata (Aldo Carlson) (I) - -     4 Lichen Acid Mix - -    105 5 Mentha Piperita Oil (Peppermint Oil) - -     6 Sesquiterpenelactone mix - -     7 Tea Tree Oil, Oxidized - -     8 Wood Tar Mix - -     9 Abietic Acid - -    110 10 Lavendula Angustifolia Oil (Lavender Oil) - -     11 Fragrance mix II CT * 14% - -      Fragrance Mix I       12 Oakmoss Absolute - -     13 Eugenol - -     14 Geraniol - -    115 15 Hydroxycitronellal - -     16 Isoeugenol - -     17 Cinnamic Aldehyde - -     18 Cinnamic Alcohol  - -      Fragrance mix II       19 Citronellol - -    120 20 Alpha-Hexylcinnamic Aldehyde    - -     21 Citral - -     22 Farnesol - -    123 23 Coumarin - -    Hexylcinnamic aldehyde, Coumarin, Farnesol, Hydroxyisohexy3-cyclohexene carboxaldehyde, citral, citrolellol  HAIRDRESSER        # Substance 2 days 4 days remarks   124 1 P-Phenylenediamine -  +    125 2 P-Toluenediamine  Sulphate  -  -     3 Ammonium Thioglycolate - -     4 Ammonium Persulfate - -     5 Resorcinol - -     6 3-Aminophenol - -    130 7 P-Aminophenol - +     8 Glyceryl Monothioglycolate - -    132 9 Pyrogallol - -     PLASTICS (Acrylates/Epoxy Systems)       # Substance 2 days 4 days remarks     Acrylates - -    133 1 2-Hydroxyethyl Methacrylate (HEMA) - -     2 1,4-Butandioldimethacrylate (BUDMA) - -    135 3  2-Ethylhexyl Acrylate - -     4 Bisphenol-A-Dimethacrylate  - -     5 Diurethane-Dimethacrylate - -     6 Ethyleneglycoldimethacrylate (EGDMA) - -     7 Pentaerythritoltriacrylate (KEITH) - -    140 8 Triethylene Glycol Dimethacrylate (TEGDMA) - -      Synthetic material/additives        9 M-Ojek-Agiaviubgme - -     10 Tricresyl Phosphate - -     11 9-Bhri-Qmyonxszdxbmc - -     12 Dioctyl phtalate(DEHP,DOP) / (Dimethylhexylphthalate)  - -    145 13 Dibutylphthalate - -     14 Dimethylphthalate - -     15 Toluene-2,4-Diisocyanate NA NA     16 Diphenylmethane-4,4''-Diisocyanate NA NA      EPOXY RESIN SYSTEMS        Reactive Solvents - -     17 Cresyl Glycidyl Ether NA NA    150 18 Butyl Glycidyl Ether - -     19 Phenyl Glycidyl Ether - -     20 1,4-Butanediol Diglycidyl Ether - -     21 1,6-Hexanediole Diglycidyl Ether - -      Hardener / Accelerator - -     22 Triethylenetetramine - -    155 23 Diethylenetriamine - -     24 Isophorone Diamine (IPD) - -     25 N,N-Dimethyl-P-Toluidine - -    158 26 Isobornyl Acrylate - -     ANTIBIOTICS & ANTIMYCOTICS    # Substance 2 days 4 days remarks   159 1 Erythromycin - -    160 2 Framycetin Sulfate - -     3 Fusidic Acid Sodium Salt - -     4 Gentamicin Sulfate - -     5 Neomycin Sulfate - -     6 Oxytetracycline  - -    165 7 Polymyxin B Sulfate - -     8 Tetracycline-HCL - -     9 Sulfanilamide - -     10 Metronidazole - -     11 Nitrofurazone - -    170 12 Nystatin - -     13 Clotrimazole - -     14 Clioquinol 5% - -     15 Miconazole  - -    174 16 Tobramycine         OTHER PRODUCTS        # Substance Conc  % solv 2 days 4 days remarks   175 1 [place] spray tan   - -    176 2 Lesia dry shampoo   - -        Results of patch tests:                         Interpretation:  - Negative                    A    = Allergic      (+) Erythema    TI   = Toxic/irritant   + E + Infiltration    RaP = Relevance at Present     ++ E/I + Papulovesicle   Rpr  = Relevance Previously     +++ E/I/P + Blister     nR   = No Relevance      DRUG ALLERGY TEST SERIES Aug 14, 2023    ANTIBIOTICS    Prick Tests         Substance/ Allergen Conc Result (20 min) Remarks   1 Cefazolin[1] 100 mg/ml -    2 Ceftriaxone* [2] 100 mg/ml -    3 Bactrim 80/400 mg/ml -    4 Nitrofurantoin powder 100 mg -       Intradermal Tests   immed immed delay delay      Substance Conc 1st dil  2nd dil  2 days  4 days remarks   1 Cefazolin[1] 1:5 -   -    2 Ceftriaxone* [2] 1:5 -   -    3 Bactrim 1:100 -   -        Patch Tests  as is as is 1:2 1:2     As Is  Vas Substance Conc 2 days 4 days 2 days 4 days remarks   1 2 Cefazolin[1] 100 mg/ml - - - -    3 4 Ceftriaxone* [2] 100 mg/ml - -  -    5 6 Bactrim 80/400 mg/ml - - (+) -    7 8 Nitrofurantoin powder 100 mg - - - -    [1]  Cefalexin/Cefazolin-group        [2]    Cefotaxim-group     [3]     Cefuroxim-group      Atopy Screen (Placed Aug 14, 2023)  No Substance Readings (15 min) Evaluation   POS Histamine 1mg/ml ++    NEG NaCl 0.9% - dermographism     No Substance Readings (15 min) Evaluation   1 Alternaria alternata (tenuis)  -    2 Cladosporium herbarum -    3 Aspergillus fumigatus -    4 Penicillium notatum -    5 Dermatophagoides pteronyssinus -    6 Dermatophagoides farinae -    7 Dog epithelium (canis spp) -    8 Cat hair (jony catus) -    9 Cockroach   (Blatella americana & germanica) -    10 Grass mix midwest   (Parris, Orchard, Redtop, Waqas) -    11 Gerardo grass (sorghum halepense) -    12 Weed mix   (common Cocklebur, Lamb s quarters, rough redroot Pigweed,  Dock/Sorrel) -    13 Mug wort (artemisia vulgare) -    14 Ragweed giant/short (ambrosia spp) -    15 White birch (Betula papyrifera) -    16 Tree mix 1 (Pecan, Maple BHR, Oak RVW, american [place], black [place]) -    17 Red cedar (juniperus virginia) -    18 Tree mix 2   (white Alvin, river/red Birch, black [place], common [place], american Elm) -    19 Box elder/Maple mix (acer spp) -    20 Clyde shagbark (carya ovata) -           Conclusion: no clear signs for positive reaction in atopy screen prick tests      Intradermal Testing (Placed Aug 14, 2023)  No Substance Conc.  Reading (15min)  immediate Papule [mm] / Erythema [mm] Reading   (4 days)  delayed Papule [mm] / Erythema [mm] Remarks   DF Standard Dust Mite - D. Farinae 1:10 -  + 5/5    DP Standard Dust Mite - D. Pteronyssinus 1:10 -  (+) 4/4    A Aspergillus fumigatus  1:10 -   -    P Penicillium notatum 1:10 -   -    Conclusions: no immediate type reaction. Some delayed type reactions to dust mites in intradermal tests    [] No relevant allergic reaction observed    [x] Allergic reaction diagnosed against following allergens:    Hair dye: + paraphenylene diamine and + p-aminophenole      Interpretation/ remarks:   Certainly some contact allergy to hair dye and as well atopic dermatitis    [] Patient information given   [] ACDS CAMP information's (# [id number], and [id number]) to following compounds: .....   [] General information's to following compounds: ......    ____________________________________________    Assessment & Plan:    ==> Final Diagnosis:     # Atopic predisposition with  Seasonal RC and Rhinosinusitis in spring  Perennial RC and Sinusitis (more at night) = delayed type reaction to dust mites  Family hx of Asthma  St after IT at home (what injected?)  * chronic illness with exacerbation, progression, side effects from treatment    # physical Urticaria triggered by heat and stress  Some improvement on Xolair  * chronic illness with  exacerbation, progression, side effects from treatment    # recurrent pruritic, papular dermatitis on trunk and extremities  > Partially allergic contact dermatitis to hair dye  > partially atopic dermatitis  DDx M. [place] Darier type  * chronic illness with exacerbation, progression, side effects from treatment     # hairloss with scalp irritation = allergic contact dermatitis?  * chronic illness with exacerbation, progression, side effects from treatment    # multiple drug allergies  Nitrofurantoin = hives and then turning into black erosions? No mucosal involvement = skin tests negatives, but patient had strong clinical reaction (more delayed type) ==> for safety reasons still avoid  Sulfonamides = hives ==> skin tests neg  Cephalexin = hives ==> skin tests neg  * chronic illness with exacerbation, progression, side effects from treatment    # Immediate type reaction with throat tightening and breathing problems after hair mask    These conclusions are made at the best of one's knowledge and belief based on the provided evidence such as patient's history and allergy test results and they can change over time or can be incomplete because of missing information's.    ==> Treatment Plan:    >> follow the recommendations of [facility] and try to use products for hair dye and toning shampoos only from the shahbaz    >> info given HOUSE DUST MITES reduction and maybe if in winter again sinus problems, try to use Nasonex nasal spray (antihistamines might not help with delayed reaction)    >> after bee sting? as child strong local reaction ==> maybe today spec IgE to bee and wasp    >> Would propose discussing with PCP about ordering lung function tests with methacholine provocation test.  - Prescribed Symbicort, use for two weeks 2x daily; can also use as rescue inhaler if needed .    >> Recommended ENT evaluation, to rule out vocal cord dysfunction as the cause of some of her breathing problems.    >> Plan to repeat atopy  screen in September. Will also do metal panel from patch tests at that time.     Procedures Performed: none    Staff Involved: Provider, Staff, and Scribe    Scribe Disclosure:   I, Lisha Manrique, am serving as a scribe to document services personally performed by Jose Amezcua MD based on data collection and the provider's statements to me.     Staff Physician Comments:  I was present with the scribe who participated in the documentation of the note. I have verified the history and personally performed the physical exam and medical decision making. I agree with the assessment and plan as documented in the note. I have reviewed and if necessary amended the note.      Jose Amezcua MD  Professor  Head of Dermato-Allergy Division  Department of Dermatology  [facility]      Follow-up in Derm-Allergy clinic for allergy tests 9/2025  - currently scheduled to follow up with Dr. Carbone on 9/12/2025  ___________________________    I spent a total of 30 minutes with Va Riddle during today s  visit. This time was spent discussing all the individual test results, correlating them to the clinical relevance, counseling the patient and/or coordinating care.

## 2025-07-08 ENCOUNTER — VIRTUAL VISIT (OUTPATIENT)
Dept: ALLERGY | Facility: CLINIC | Age: 45
End: 2025-07-08
Attending: DERMATOLOGY
Payer: COMMERCIAL

## 2025-07-08 ENCOUNTER — MYC REFILL (OUTPATIENT)
Dept: DERMATOLOGY | Facility: CLINIC | Age: 45
End: 2025-07-08

## 2025-07-08 DIAGNOSIS — H10.10 SEASONAL AND PERENNIAL ALLERGIC RHINOCONJUNCTIVITIS: ICD-10-CM

## 2025-07-08 DIAGNOSIS — Z88.9 MULTIPLE DRUG ALLERGIES: ICD-10-CM

## 2025-07-08 DIAGNOSIS — L30.9 DERMATITIS: ICD-10-CM

## 2025-07-08 DIAGNOSIS — J30.2 SEASONAL AND PERENNIAL ALLERGIC RHINOCONJUNCTIVITIS: ICD-10-CM

## 2025-07-08 DIAGNOSIS — Z88.9 ATOPY: ICD-10-CM

## 2025-07-08 DIAGNOSIS — J32.9 CHRONIC RHINOSINUSITIS: ICD-10-CM

## 2025-07-08 DIAGNOSIS — T78.40XD ALLERGIC REACTION, SUBSEQUENT ENCOUNTER: ICD-10-CM

## 2025-07-08 DIAGNOSIS — L50.1 CHRONIC IDIOPATHIC URTICARIA: ICD-10-CM

## 2025-07-08 DIAGNOSIS — L23.89 ALLERGIC CONTACT DERMATITIS DUE TO OTHER AGENTS: ICD-10-CM

## 2025-07-08 DIAGNOSIS — J45.21 MILD INTERMITTENT ASTHMA WITH ACUTE EXACERBATION: Primary | ICD-10-CM

## 2025-07-08 DIAGNOSIS — J30.89 SEASONAL AND PERENNIAL ALLERGIC RHINOCONJUNCTIVITIS: ICD-10-CM

## 2025-07-08 DIAGNOSIS — L20.89 OTHER ATOPIC DERMATITIS: ICD-10-CM

## 2025-07-08 RX ORDER — DUPILUMAB 300 MG/2ML
300 INJECTION, SOLUTION SUBCUTANEOUS
Qty: 12 ML | Refills: 3 | Status: SHIPPED | OUTPATIENT
Start: 2025-07-08

## 2025-07-08 RX ORDER — DUPILUMAB 300 MG/2ML
INJECTION, SOLUTION SUBCUTANEOUS
Qty: 12 ML | Refills: 1 | Status: CANCELLED | OUTPATIENT
Start: 2025-07-08

## 2025-07-08 RX ORDER — BUDESONIDE AND FORMOTEROL FUMARATE DIHYDRATE 160; 4.5 UG/1; UG/1
2 AEROSOL RESPIRATORY (INHALATION) 2 TIMES DAILY
Qty: 10.2 G | Refills: 3 | Status: SHIPPED | OUTPATIENT
Start: 2025-07-08 | End: 2025-07-22

## 2025-07-08 NOTE — LETTER
7/8/2025      Va Riddle  [address]  [facility] [address]      Dear Colleague,    Thank you for referring your patient, Va Riddle, to the [facility] [place]. Please see a copy of my visit note below.    [facility] Dermato-allergology Note  Virtual visit: store and forward video (Freebasehart connected), start time: 12:10 pm, end time: 12:40 pm, date of images: none  Encounter Date: Jul 8, 2025    Virtual Visit Details  Type of service:  Video Visit   Originating Location (pt. Location): Home    Distant Location (provider location):  On-site  Platform used for Video Visit: [employer]   ____________________________________________    CC: Allergy Testing Followup (Video visit (Doximity) recent anaphylactic reaction to possibly a hair mask. Needing a new epi pen set. Had 3 throat closings in 1 week span. )      HPI:  (Jul 8, 2025)  Ms. Va Riddle is a(n) 45 year old female who presents today as a return patient for allergy consultation  - Follow-up in Derm-Allergy clinic if necessary ==> sees today Dr. Carbone   - Started using a tinted hair mask  - For the first week she used it a couple times and thought she was getting sick (runny nose, coughing, losing voice)  - Happened pretty quickly   - This resolved but then came back after using the hair mask on Monday  - When she got home that day, she coughed and then immediately had throat closing   - scalp redness with metal clips   - scalp also burns when she is on her menstrual cycle   - Otherwise feeling well in usual state of health    Physical Exam:  General: In no acute distress, well-developed, well-nourished  Eyes: no conjunctivitis  ENT: no signs of rhinitis   Pulmonary: no wheezing or coughing  Skin:Focused examination of the skin on test sites was performed = see test results below    Earlier History and Allergy Exams:  (Aug 18, 2023)  Presents today as a return patient for  allergy consultation  - Follow-up in Derm-Allergy clinic for 2nd readings and final conclusions after 4 days       (Aug 16, 2023)  - Follow-up in Derm-Allergy clinic for 1st readings of patch tests after 2 days       (Aug 14, 2023)  - Follow-up in Derm-Allergy clinic for allergy tests as planned (has to be off systemic antihistamines for about 1 week --> topical or inhaled steroids OK as long not on the back)   On the entire back colorful tattoo, have to test over it     (May 15, 2023)  2 different kinds of rash.  First rash, maybe started ~7 years ago. Describes as scaly brown/pink dots on chest and backs. 5 years ago, became particularly bothersome, would have to wear different shirts and found herself having to cover rash up.. The lesions became angry and spread, recently has spread up/down to legs and all over back. Went to local dermatologist, thought it was dermatitis. Has been treated with cortisone, betamethasone, tretinoin, triamcinolone, different eczema lotions. Has tried Singulair, claritin, benadryl (currently itches with benadryl now). Started Xolair which initially seemed to suppress ~70% of the rash, however has become less efficacious over time and rash has not resolved. Dad and nieces/nephews have asthma. 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 Has had allergy testing in the past several years ago and received allergy shots.    Also has another rash, describes as big, red/purple, splotchy swelling typically localized in upper body up to neck. Feels prickly/staticky prior to onset. This rash tends to be triggered by heat, eating anything warm or in sun, emotional stimulus, high BP. It comes and goes, can happen up to several times a day. Cold water helps with rash. Does not improve with allergy medicines. Gets tickle in back of throat, won't go away.    Dad, not 100% sure is dad, pretty significant skin stuff. Liver spots. Warts, patches.    Laundry  detergent: Gain, has used all different kinds; no difference, no itching when put clothes on. Bleach slightly reactive. Soaps: bodywash dove sensitive, Aveeno oatmeal kind, whatever body washes. Didn't find too much. Does use spray tan pretty frequently; doesn't see much of a difference. Bumps do become more prominent after spray tan.      Past Medical History:   There is no problem list on file for this patient.    No past medical history on file.    Allergies:  Allergies   Allergen Reactions     Metoclopramide Hives     Also felt edgy and claustrophobic along with feeling itchy everywhere.  Doesn't ever want it again IV or oral     Nitrofurantoin Dermatitis     Mucositis and delayed type reaction  >> because reaction to Nitrofurantoin was recent and more delayed type reaction I would recommend to avoid it despite negative skin tests         Medications:  Current Outpatient Medications   Medication Sig Dispense Refill     ALPRAZolam (XANAX) 0.5 MG tablet Take 1 tablet by mouth daily at 2 pm       amphetamine-dextroamphetamine (ADDERALL) 20 MG tablet Take 1 tablet by mouth       atenolol (TENORMIN) 25 MG tablet        clobetasol (TEMOVATE) 0.05 % external solution        clobetasol propionate (CLOBEX) 0.05 % external shampoo Apply to a dry scalp, leave on for 15 minutes, then lather and rinse - do up to 3 times per week as tolerated. 354 mL 3     DUPIXENT 300 MG/2ML prefilled syringe INJECT 300 MG (2 ML) UNDER THE SKIN EVERY 14 DAYS 12 mL 1     EPINEPHrine (ANY BX GENERIC EQUIV) 0.3 MG/0.3ML injection 2-pack Inject 0.3 mLs (0.3 mg) into the muscle as needed for anaphylaxis. May repeat one time in 5-15 minutes if response to initial dose is inadequate. 2 each 0     FLUoxetine (PROZAC) 20 MG capsule Take 20 mg by mouth daily.       gabapentin 6 % SOLN solution APPLY 0.5 ML TO AFFECTED AREA ON SCALP DAILY AFTER SHAMPOOING ~ 6 PM. (Patient not taking: Reported on 1/10/2025) 60 mL 11     ketoconazole (NIZORAL) 2 %  external shampoo Apply topically daily as needed for itching or irritation. 120 mL 2     linaclotide (LINZESS) 290 MCG capsule 1 capsule       minoxidil (LONITEN) 2.5 MG tablet One tablet daily as tolerated 90 tablet 1     MINOXIDIL FOR WOMEN EX Topical compounded solution: contains spironolactone and minoxidil per patient  From [facility]       UBRELVY 100 MG tablet        Current Facility-Administered Medications   Medication Dose Route Frequency Provider Last Rate Last Admin     triamcinolone acetonide (KENALOG-10) injection 20 mg  20 mg Intra-Lesional Once Marlys Carbone MD           Social History:  The patient  for [employer] and [employer]. Patient has the following hobbies or non-occupational exposure work out, dance, play with kids, ride motorcycles    Family History:  Family History   Problem Relation Age of Onset     Melanoma Mother      Skin Cancer Maternal Uncle        Previous Labs, Allergy Tests, Dermatopathology, Imaging:    Previous TRUE Test: 3/1/23:   1+ reactions:   - Nickel sulfate  Paraben Mix  Thimersal   Allergens showing irritant reactions  2-bromo-2-nitropropene-1,3-diol    Referred By: No referring provider defined for this encounter.     Allergy Tests:    Past Allergy Test    Order for Future Allergy Testing:    [x] Outpatient  [] Inpatient: [facility]..../ Bed ....       Skin Atopy (atopic dermatitis) [x] Yes   [] No local derm says she has eczema  Contact allergies:   [x] Yes   [] No ..........   Hand eczema:   [x] Yes   [] No goldbond eczema helps, look crepey           Leading hand:   [] R   [] L       [] Ambidextrous         Drug allergies:        [x] Yes   [] No  Which? Extreme hives turned into SJS nitrofurantoin  Urticaria/Angioedema  [x] Yes   [] No .........  Food Allergy:  [] Yes   [x] No  which?......  Pets :  [x] Yes   [] No  Which?1 dog         [x]  Rhinitis   [x] Conjunctivitis   [x] Sinusitis   [] Polyposis   [x]  Otitis (fluid/discharge behind ears)   [] Pharyngitis         [x]  Postnasal drip    []  none  Operations:   [] Tonsils   [] Septum   [] Sinus   [] Polyposis        [] Asthma bronchiale   [x] Coughing      []  none  Symptoms (mostly Rhinoconjunctivitis and Asthma) aggravated by:  Season   [] I   [] II   [] III   [x] IV   [x]V   [x]VI   []VII   []VIII   []IX   []X   []XI   []XII     [x] perennial   Day time      [] morning   [] noon      [] evening        [x] night    [x] whole day........  []  none  Location/changes    [] inside        [] outside   [] mountains    [] sea     [] others.............   [x]  none  Triggers, specific     [] animals     [] plants     [] dust              [] others ...........................    [x]  none  Triggers, others       [] work          [] psyche    [] sport            [] others .............................  [x]  none  Irritant                [] phys efforts [] smoke    [] heat/cold     [] odors  [x]others. Air turn on . []  none    Order for PATCH TESTS  Reason for tests (suspected allergy): eczematous rash on trunk and extremities and scalp = ACD?  Known previous allergies: had true test in the past borderline pos to Nickel, Parabens. Thimerosal  Standardized panels  [x] Standard panel (40 tests)  [x] Preservatives & Antimicrobials (31 tests)  [x] Emulsifiers & Additives (25 tests)   [x] Perfumes/Flavours & Plants (25 tests)  [x] Hairdresser panel (12 tests)  [] Rubber Chemicals (22 tests)  [x] Plastics (26 tests)  [] Colorants/Dyes/Food additives (20 tests)  [] Metals (implants/dental) (24 tests)  [] Local anaesthetics/NSAIDs (13 tests)  [x] Antibiotics & Antimycotics (14 tests)   [] Corticosteroids (15 tests)   [] Photopatch test (62 tests)   [] others: ...      [x] Patient's own products: .spray tan  DO NOT test if chemical or biological identity is unknown!   always ask from patient the product information and safety sheets (MSDS)       Order for PRICK TESTS    Reason for  tests (suspected allergy): atopy?  Known previous allergies: had IT at home and now tests negatives?    Standardized prick panels  [x] Atopic panel (20 tests)  [] Pediatric Panel (12 tests)  [] Milk, Meat, Eggs, Grains (20 tests)   [] Dust, Epithelia, Feathers (10 tests)  [] Fish, Seafood, Shellfish (17 tests)  [] Nuts, Beans (14 tests)  [] Spice, Vegetable, Fruit (17 tests)  [] Pollen Panel = Tree, Grass, Weed (24 tests)  [] Others: ...      [] Patient's own products: ...  DO NOT test if chemical or biological identity is unknown!   always ask from patient the product information and safety sheets (MSDS)     Standardized intradermal tests  [x] Penicillium notatum [x] Aspergillus fumigatus [x] House dust mites D.far & D. pteron  [] Cat    [] dog  [] Others: ...  [] Bee venom   [] Wasp venom  !!Specific protocol with dilutions!!       Order for Drug allergy tests (prick & Intradermal & patch tests)    [] Penicillin G  [] Ampicillin [x] Cefazolin   [x] Ceftriaxone   [] Ceftazidime  [x] Bactrim    [x] Others: Nitrofurantoin  Order for ... as test date    ________________________________  RESULTS & EVALUATION of PATCH TESTS    Aug 14, 2023 application of patch tests:    Patch test readings after     [x] 2 days, [] 3 days [x] 4 days, [] 5 days,  Other duration: ...    STANDARD Series                                          # Substance 2 days 4 days remarks     1 Francisco Mix [C] - -       2 Colophony - -       3  2-Mercaptobenzothiazole  - -       4 Methylisothiazolinone - -       5 Carba Mix - -       6 Thiuram Mix [A] - -       7 Bisphenol A Epoxy Resin - -       8 W-Menz-Dwbqarioojy-Formaldehyde Resin - -       9 Mercapto Mix [A] - -       10 Black Rubber Mix- PPD [B] - -       11 Potassium Dichromate  -  -       12 Balsam of [place] (Myroxylon Pereirae Resin) - -       13 Nickel Sulphate Hexahydrate - -       14 Mixed Dialkyl Thiourea - -       15 Paraben Mix [B] - -       16 Methyldibromo Glutaronitrile - -       17  Fragrance Mix 8% - -       18 2-Bromo-2-Nitropropane-1,3-Diol (Bronopol) CT - -       19 Lyral - -       20 Tixocortol-21- Pivalate CT - -       21 Diazolidinyl urea (Germall II) - -        22 Methyl Methacrylate - -       23 Cobalt (II) Chloride Hexahydrate - -       24 Fragrance Mix II  - -       25 Compositae Mix - -       26 Benzoyl Peroxide - -       27 Bacitracin - -       28 Formaldehyde - -       29 Methylchloroisothiazolinone / Methylisothiazolinone - -       30 Corticosteroid Mix CT - -       31 Sodium Lauryl Sulfate - -       32 Lanolin Alcohol - -       33 Turpentine - -       34 Cetylstearylalcohol - -       35 Chlorhexidine Dicluconate - -       36 Budesonide - -       37 Imidazolidinyl Urea  - -       38 Ethyl-2 Cyanoacrylate - -       39 Quaternium 15 (Dowicil 200) - -       40 Decyl Glucoside - -      PRESERVATIVES & ANTIMICROBIALS        # Substance 2 days 4 days remarks   41 1  1,2-Benzisothiazoline-3-One, Sodium Salt - -     2  1,3,5-Martinez (2-Hydroxyethyl) - Hexahydrotriazine (Grotan BK) - -     3 1-Sejgwulrazsrp-8-Nitro-1, 3-Propanediol NA NA     4  3, 4, 4' - Triclocarban - -    45 5 4 - Chloro - 3 - Cresol - -     6 4 - Chloro - 4 - Xylenol (PCMX) - -     7 7-Ethylbicyclooxazolidine (Bioban GL4115) - -     8 Benzalkonium Chloride CT - -     9 Benzyl Alcohol - -    50 10 Cetalkonium Chloride - -     11 Cetylpyrimidine Chloride  - -     12 Chloroacetamide - -     13 DMDM Hydantoin - -     14 Glutaraldehyde - -    55 15 Triclosan - -     16 Glyoxal Trimeric Dihydrate - -     17 Iodopropynyl Butylcarbamate - -     18 Octylisothiazoline - -     19 Bithionol CT - -    60 20 Bioban P 1487 (Nitrobutyl) Morpholine/(Ethylnitro-Trimethylene) Dimorpholine - -     21 Phenoxyethanol - -     22 Phenyl Salicylate - -     23 Povidone Iodine - -     24 Sodium Benzoate - -    65 25 Sodium Disulfite - -     26 Sorbic Acid - -     27 Thimerosal - -     28 Melamine Formaldehyde Resin - -     29 Ethylenediamine  Dihydrochloride - -      Parabens      70 30 Butyl-P-Hydroxybenzoate - -     31 Ethyl-P-Hydroxybenzoate - -     32 Methyl-P-Hydroxybenzoate - -    73 33 Propyl-P-Hydroxybenzoate - -     EMULSIFIERS & ADDITIVES       # Substance 2 days 4 days remarks   74 1 Polyethylene Glycol-400 - -    75 2 Cocamidopropyl Betaine - -     3 Amerchol L101 - -     4 Propylene Glycol - -     5 Triethanolamine - -     6 Sorbitane Sesquiolate CT - -    80 7 Isopropylmyristate - -     8 Polysorbate 80 CT - -     9 Amidoamine   (Stearamidopropyl Dimethylamine) - -     10 Oleamidopropyl Dimethylamine - -     11 Lauryl Glucoside - -    85 12 Coconut Diethanolamide  - -     13 2-Hydroxy-4-Methoxy Benzophenone (Oxybenzone) - -     14 Benzophenone-4 (Sulisobenzon) - -     15 Propolis - -     16 Dexpanthenol - -    90 17 Abitol - -     18 Tert-Butylhydroquinone - -     19 Benzyl Salicylate - -     20 Dimethylaminopropylamin (DMPA) - -     21 Zinc Pyrithione (Zinc Omadine)  - -    95 22 Martinez(Hydroxymethyl) Nitromethane  - -      Antioxidant       23 Dodecyl Gallate - -     24 Butylhydroxyanisole (BHA) - -     25 Butylhydroxytoluene (BHT) - -     26 Di-Alpha-Tocopherol (Vit E) - -    100 27 Propyl Gallate - -     PERFUMES, FLAVORS & PLANTS        # Substance 2 days 4 days remarks   101 1 Benzyl Cinnamate - -     2 Di-Limonene (Dipentene) - -     3 Cananga Odorata (Aldo Carlson) (I) - -     4 Lichen Acid Mix - -    105 5 Mentha Piperita Oil (Peppermint Oil) - -     6 Sesquiterpenelactone mix - -     7 Tea Tree Oil, Oxidized - -     8 Wood Tar Mix - -     9 Abietic Acid - -    110 10 Lavendula Angustifolia Oil (Lavender Oil) - -     11 Fragrance mix II CT * 14% - -      Fragrance Mix I       12 Oakmoss Absolute - -     13 Eugenol - -     14 Geraniol - -    115 15 Hydroxycitronellal - -     16 Isoeugenol - -     17 Cinnamic Aldehyde - -     18 Cinnamic Alcohol  - -      Fragrance mix II       19 Citronellol - -    120 20 Alpha-Hexylcinnamic Aldehyde     - -     21 Citral - -     22 Farnesol - -    123 23 Coumarin - -    Hexylcinnamic aldehyde, Coumarin, Farnesol, Hydroxyisohexy3-cyclohexene carboxaldehyde, citral, citrolellol  HAIRDRESSER        # Substance 2 days 4 days remarks   124 1 P-Phenylenediamine -  +    125 2 P-Toluenediamine Sulphate  -  -     3 Ammonium Thioglycolate - -     4 Ammonium Persulfate - -     5 Resorcinol - -     6 3-Aminophenol - -    130 7 P-Aminophenol - +     8 Glyceryl Monothioglycolate - -    132 9 Pyrogallol - -     PLASTICS (Acrylates/Epoxy Systems)       # Substance 2 days 4 days remarks     Acrylates - -    133 1 2-Hydroxyethyl Methacrylate (HEMA) - -     2 1,4-Butandioldimethacrylate (BUDMA) - -    135 3  2-Ethylhexyl Acrylate - -     4 Bisphenol-A-Dimethacrylate  - -     5 Diurethane-Dimethacrylate - -     6 Ethyleneglycoldimethacrylate (EGDMA) - -     7 Pentaerythritoltriacrylate (KEITH) - -    140 8 Triethylene Glycol Dimethacrylate (TEGDMA) - -      Synthetic material/additives        9 A-Cake-Budyvvnthep - -     10 Tricresyl Phosphate - -     11 5-Nbzy-Wrtlpimuuehmh - -     12 Dioctyl phtalate(DEHP,DOP) / (Dimethylhexylphthalate)  - -    145 13 Dibutylphthalate - -     14 Dimethylphthalate - -     15 Toluene-2,4-Diisocyanate NA NA     16 Diphenylmethane-4,4''-Diisocyanate NA NA      EPOXY RESIN SYSTEMS        Reactive Solvents - -     17 Cresyl Glycidyl Ether NA NA    150 18 Butyl Glycidyl Ether - -     19 Phenyl Glycidyl Ether - -     20 1,4-Butanediol Diglycidyl Ether - -     21 1,6-Hexanediole Diglycidyl Ether - -      Hardener / Accelerator - -     22 Triethylenetetramine - -    155 23 Diethylenetriamine - -     24 Isophorone Diamine (IPD) - -     25 N,N-Dimethyl-P-Toluidine - -    158 26 Isobornyl Acrylate - -     ANTIBIOTICS & ANTIMYCOTICS    # Substance 2 days 4 days remarks   159 1 Erythromycin - -    160 2 Framycetin Sulfate - -     3 Fusidic Acid Sodium Salt - -     4 Gentamicin Sulfate - -     5 Neomycin Sulfate -  -     6 Oxytetracycline  - -    165 7 Polymyxin B Sulfate - -     8 Tetracycline-HCL - -     9 Sulfanilamide - -     10 Metronidazole - -     11 Nitrofurazone - -    170 12 Nystatin - -     13 Clotrimazole - -     14 Clioquinol 5% - -     15 Miconazole  - -    174 16 Tobramycine        OTHER PRODUCTS        # Substance Conc  % solv 2 days 4 days remarks   175 1 [place] spray tan   - -    176 2 Lesia dry shampoo   - -        Results of patch tests:                         Interpretation:  - Negative                    A    = Allergic      (+) Erythema    TI   = Toxic/irritant   + E + Infiltration    RaP = Relevance at Present     ++ E/I + Papulovesicle   Rpr  = Relevance Previously     +++ E/I/P + Blister     nR   = No Relevance      DRUG ALLERGY TEST SERIES Aug 14, 2023    ANTIBIOTICS    Prick Tests         Substance/ Allergen Conc Result (20 min) Remarks   1 Cefazolin[1] 100 mg/ml -    2 Ceftriaxone* [2] 100 mg/ml -    3 Bactrim 80/400 mg/ml -    4 Nitrofurantoin powder 100 mg -       Intradermal Tests   immed immed delay delay      Substance Conc 1st dil  2nd dil  2 days  4 days remarks   1 Cefazolin[1] 1:5 -   -    2 Ceftriaxone* [2] 1:5 -   -    3 Bactrim 1:100 -   -        Patch Tests  as is as is 1:2 1:2     As Is  Vas Substance Conc 2 days 4 days 2 days 4 days remarks   1 2 Cefazolin[1] 100 mg/ml - - - -    3 4 Ceftriaxone* [2] 100 mg/ml - -  -    5 6 Bactrim 80/400 mg/ml - - (+) -    7 8 Nitrofurantoin powder 100 mg - - - -    [1]  Cefalexin/Cefazolin-group        [2]    Cefotaxim-group     [3]     Cefuroxim-group      Atopy Screen (Placed Aug 14, 2023)  No Substance Readings (15 min) Evaluation   POS Histamine 1mg/ml ++    NEG NaCl 0.9% - dermographism     No Substance Readings (15 min) Evaluation   1 Alternaria alternata (tenuis)  -    2 Cladosporium herbarum -    3 Aspergillus fumigatus -    4 Penicillium notatum -    5 Dermatophagoides pteronyssinus -    6 Dermatophagoides farinae -    7 Dog  epithelium (canis spp) -    8 Cat hair (jony catus) -    9 Cockroach   (Blatella americana & germanica) -    10 Grass mix midwest   (Parris, Orchard, Redtop, Waqas) -    11 Gerardo grass (sorghum halepense) -    12 Weed mix   (common Cocklebur, Lamb s quarters, rough redroot Pigweed, Dock/Sorrel) -    13 Mug wort (artemisia vulgare) -    14 Ragweed giant/short (ambrosia spp) -    15 White birch (Betula papyrifera) -    16 Tree mix 1 (Pecan, Maple BHR, Oak RVW, american [place], black Pioche) -    17 Red cedar (juniperus virginia) -    18 Tree mix 2   (white Alvin, river/red Birch, black [place], common [place], american Elm) -    19 Box elder/Maple mix (acer spp) -    20 Craven shagbark (carya ovata) -           Conclusion: no clear signs for positive reaction in atopy screen prick tests      Intradermal Testing (Placed Aug 14, 2023)  No Substance Conc.  Reading (15min)  immediate Papule [mm] / Erythema [mm] Reading   (4 days)  delayed Papule [mm] / Erythema [mm] Remarks   DF Standard Dust Mite - D. Farinae 1:10 -  + 5/5    DP Standard Dust Mite - D. Pteronyssinus 1:10 -  (+) 4/4    A Aspergillus fumigatus  1:10 -   -    P Penicillium notatum 1:10 -   -    Conclusions: no immediate type reaction. Some delayed type reactions to dust mites in intradermal tests    [] No relevant allergic reaction observed    [x] Allergic reaction diagnosed against following allergens:    Hair dye: + paraphenylene diamine and + p-aminophenole      Interpretation/ remarks:   Certainly some contact allergy to hair dye and as well atopic dermatitis    [] Patient information given   [] ACDS CAMP information's (# [id number], and [id number]) to following compounds: .....   [] General information's to following compounds: ......    ____________________________________________    Assessment & Plan:    ==> Final Diagnosis:     # Atopic predisposition with  Seasonal RC and Rhinosinusitis in spring  Perennial RC and Sinusitis (more at night) =  delayed type reaction to dust mites  Family hx of Asthma  St after IT at home (what injected?)  * chronic illness with exacerbation, progression, side effects from treatment    # physical Urticaria triggered by heat and stress  Some improvement on Xolair  * chronic illness with exacerbation, progression, side effects from treatment    # recurrent pruritic, papular dermatitis on trunk and extremities  > Partially allergic contact dermatitis to hair dye  > partially atopic dermatitis  DDx M. Dinesh Darier type  * chronic illness with exacerbation, progression, side effects from treatment     # hairloss with scalp irritation = allergic contact dermatitis?  * chronic illness with exacerbation, progression, side effects from treatment    # multiple drug allergies  Nitrofurantoin = hives and then turning into black erosions? No mucosal involvement = skin tests negatives, but patient had strong clinical reaction (more delayed type) ==> for safety reasons still avoid  Sulfonamides = hives ==> skin tests neg  Cephalexin = hives ==> skin tests neg  * chronic illness with exacerbation, progression, side effects from treatment    # Immediate type reaction with throat tightening and breathing problems after hair mask    These conclusions are made at the best of one's knowledge and belief based on the provided evidence such as patient's history and allergy test results and they can change over time or can be incomplete because of missing information's.    ==> Treatment Plan:    >> follow the recommendations of [facility] and try to use products for hair dye and toning shampoos only from the shahbaz    >> info given HOUSE DUST MITES reduction and maybe if in winter again sinus problems, try to use Nasonex nasal spray (antihistamines might not help with delayed reaction)    >> after bee sting? as child strong local reaction ==> maybe today spec IgE to bee and wasp    >> Would propose discussing with PCP about ordering lung function tests  with methacholine provocation test.  - Prescribed Symbicort, use for two weeks 2x daily; can also use as rescue inhaler if needed .    >> Recommended ENT evaluation, to rule out vocal cord dysfunction as the cause of some of her breathing problems.    >> Plan to repeat atopy screen in September. Will also do metal panel from patch tests at that time.     Procedures Performed: none    Staff Involved: Provider, Staff, and Scribe    Scribe Disclosure:   I, Tayopedromalik Hagannels, am serving as a scribe to document services personally performed by Jose Amezcua MD based on data collection and the provider's statements to me.     Staff Physician Comments:  I was present with the scribe who participated in the documentation of the note. I have verified the history and personally performed the physical exam and medical decision making. I agree with the assessment and plan as documented in the note. I have reviewed and if necessary amended the note.      Jose Amezcua MD  Professor  Head of Dermato-Allergy Division  Department of Dermatology  [facility]      Follow-up in Derm-Allergy clinic for allergy tests 9/2025  - currently scheduled to follow up with Dr. Carbone on 9/12/2025  ___________________________    I spent a total of 30 minutes with Va Riddle during today s  visit. This time was spent discussing all the individual test results, correlating them to the clinical relevance, counseling the patient and/or coordinating care.    Again, thank you for allowing me to participate in the care of your patient.        Sincerely,        Jose Amezcua MD    Electronically signed

## (undated) RX ORDER — CEFAZOLIN SODIUM 500 MG/2.2ML
INJECTION, POWDER, FOR SOLUTION INTRAMUSCULAR; INTRAVENOUS
Status: DISPENSED
Start: 2023-08-14

## (undated) RX ORDER — SULFAMETHOXAZOLE AND TRIMETHOPRIM 80; 16 MG/ML; MG/ML
INJECTION INTRAVENOUS
Status: DISPENSED
Start: 2023-08-14

## (undated) RX ORDER — CEFTRIAXONE SODIUM 250 MG/1
INJECTION, POWDER, FOR SOLUTION INTRAMUSCULAR; INTRAVENOUS
Status: DISPENSED
Start: 2023-08-14